# Patient Record
Sex: MALE | Race: WHITE | NOT HISPANIC OR LATINO | Employment: OTHER | URBAN - METROPOLITAN AREA
[De-identification: names, ages, dates, MRNs, and addresses within clinical notes are randomized per-mention and may not be internally consistent; named-entity substitution may affect disease eponyms.]

---

## 2018-02-13 ENCOUNTER — OFFICE VISIT CONVERTED (OUTPATIENT)
Dept: OTOLARYNGOLOGY | Facility: CLINIC | Age: 78
End: 2018-02-13
Attending: OTOLARYNGOLOGY

## 2018-02-13 ENCOUNTER — CONVERSION ENCOUNTER (OUTPATIENT)
Dept: OTOLARYNGOLOGY | Facility: CLINIC | Age: 78
End: 2018-02-13

## 2018-08-15 ENCOUNTER — OFFICE VISIT CONVERTED (OUTPATIENT)
Dept: OTOLARYNGOLOGY | Facility: CLINIC | Age: 78
End: 2018-08-15
Attending: OTOLARYNGOLOGY

## 2018-09-06 ENCOUNTER — OFFICE VISIT CONVERTED (OUTPATIENT)
Dept: OTHER | Facility: HOSPITAL | Age: 78
End: 2018-09-06
Attending: NURSE PRACTITIONER

## 2018-11-28 ENCOUNTER — OFFICE VISIT CONVERTED (OUTPATIENT)
Dept: OTHER | Facility: HOSPITAL | Age: 78
End: 2018-11-28
Attending: NURSE PRACTITIONER

## 2018-11-28 ENCOUNTER — CONVERSION ENCOUNTER (OUTPATIENT)
Dept: OTHER | Facility: HOSPITAL | Age: 78
End: 2018-11-28

## 2019-06-10 ENCOUNTER — CONVERSION ENCOUNTER (OUTPATIENT)
Dept: OTHER | Facility: HOSPITAL | Age: 79
End: 2019-06-10

## 2019-06-10 ENCOUNTER — OFFICE VISIT CONVERTED (OUTPATIENT)
Dept: OTHER | Facility: HOSPITAL | Age: 79
End: 2019-06-10
Attending: NURSE PRACTITIONER

## 2019-06-14 ENCOUNTER — CONVERSION ENCOUNTER (OUTPATIENT)
Dept: OTHER | Facility: HOSPITAL | Age: 79
End: 2019-06-14

## 2019-06-14 ENCOUNTER — OFFICE VISIT CONVERTED (OUTPATIENT)
Dept: OTHER | Facility: HOSPITAL | Age: 79
End: 2019-06-14
Attending: NURSE PRACTITIONER

## 2019-06-14 ENCOUNTER — HOSPITAL ENCOUNTER (OUTPATIENT)
Dept: OTHER | Facility: HOSPITAL | Age: 79
Discharge: HOME | End: 2019-06-14
Attending: NURSE PRACTITIONER

## 2019-06-14 LAB
BASOPHILS # BLD AUTO: 0.05 10*3/UL (ref 0–0.2)
BASOPHILS NFR BLD AUTO: 0.6 % (ref 0–3)
CONV ABS IMM GRAN: 0.03 10*3/UL (ref 0–0.2)
CONV IMMATURE GRAN: 0.4 % (ref 0–1.8)
DEPRECATED RDW RBC AUTO: 46.4 FL (ref 35.1–43.9)
EOSINOPHIL # BLD AUTO: 0.48 10*3/UL (ref 0–0.7)
EOSINOPHIL # BLD AUTO: 6.1 % (ref 0–7)
ERYTHROCYTE [DISTWIDTH] IN BLOOD BY AUTOMATED COUNT: 13.1 % (ref 11.6–14.4)
HBA1C MFR BLD: 14.7 G/DL (ref 14–18)
HCT VFR BLD AUTO: 44.5 % (ref 42–52)
LYMPHOCYTES # BLD AUTO: 1.48 10*3/UL (ref 1–5)
MCH RBC QN AUTO: 31.9 PG (ref 27–31)
MCHC RBC AUTO-ENTMCNC: 33 G/DL (ref 33–37)
MCV RBC AUTO: 96.5 FL (ref 80–96)
MONOCYTES # BLD AUTO: 0.57 10*3/UL (ref 0.2–1.2)
MONOCYTES NFR BLD AUTO: 7.2 % (ref 3–10)
NEUTROPHILS # BLD AUTO: 5.3 10*3/UL (ref 2–8)
NEUTROPHILS NFR BLD AUTO: 67 % (ref 30–85)
NRBC CBCN: 0 % (ref 0–0.7)
PLATELET # BLD AUTO: 164 10*3/UL (ref 130–400)
PMV BLD AUTO: 11.6 FL (ref 9.4–12.4)
RBC # BLD AUTO: 4.61 10*6/UL (ref 4.7–6.1)
VARIANT LYMPHS NFR BLD MANUAL: 18.7 % (ref 20–45)
WBC # BLD AUTO: 7.91 10*3/UL (ref 4.8–10.8)

## 2019-06-18 LAB — CONV ANTI GALACTOSE ALPHA 1,3 IGE: <0.1 KU/L

## 2019-07-01 ENCOUNTER — OFFICE VISIT CONVERTED (OUTPATIENT)
Dept: OTHER | Facility: HOSPITAL | Age: 79
End: 2019-07-01
Attending: NURSE PRACTITIONER

## 2019-07-01 ENCOUNTER — CONVERSION ENCOUNTER (OUTPATIENT)
Dept: OTHER | Facility: HOSPITAL | Age: 79
End: 2019-07-01

## 2019-07-01 ENCOUNTER — HOSPITAL ENCOUNTER (OUTPATIENT)
Dept: OTHER | Facility: HOSPITAL | Age: 79
Discharge: HOME | End: 2019-07-01
Attending: NURSE PRACTITIONER

## 2019-07-01 LAB
BASOPHILS # BLD AUTO: 0.05 10*3/UL (ref 0–0.2)
BASOPHILS NFR BLD AUTO: 0.7 % (ref 0–3)
CONV ABS IMM GRAN: 0.02 10*3/UL (ref 0–0.2)
CONV IMMATURE GRAN: 0.3 % (ref 0–1.8)
DEPRECATED RDW RBC AUTO: 47.7 FL (ref 35.1–43.9)
EOSINOPHIL # BLD AUTO: 0.13 10*3/UL (ref 0–0.7)
EOSINOPHIL # BLD AUTO: 1.9 % (ref 0–7)
ERYTHROCYTE [DISTWIDTH] IN BLOOD BY AUTOMATED COUNT: 13.1 % (ref 11.6–14.4)
HBA1C MFR BLD: 15.1 G/DL (ref 14–18)
HCT VFR BLD AUTO: 47.9 % (ref 42–52)
LYMPHOCYTES # BLD AUTO: 1.59 10*3/UL (ref 1–5)
MCH RBC QN AUTO: 30.9 PG (ref 27–31)
MCHC RBC AUTO-ENTMCNC: 31.5 G/DL (ref 33–37)
MCV RBC AUTO: 98.2 FL (ref 80–96)
MONOCYTES # BLD AUTO: 0.38 10*3/UL (ref 0.2–1.2)
MONOCYTES NFR BLD AUTO: 5.7 % (ref 3–10)
NEUTROPHILS # BLD AUTO: 4.51 10*3/UL (ref 2–8)
NEUTROPHILS NFR BLD AUTO: 67.6 % (ref 30–85)
NRBC CBCN: 0 % (ref 0–0.7)
PLATELET # BLD AUTO: 163 10*3/UL (ref 130–400)
PMV BLD AUTO: 11.7 FL (ref 9.4–12.4)
RBC # BLD AUTO: 4.88 10*6/UL (ref 4.7–6.1)
VARIANT LYMPHS NFR BLD MANUAL: 23.8 % (ref 20–45)
WBC # BLD AUTO: 6.68 10*3/UL (ref 4.8–10.8)

## 2019-07-02 LAB
25(OH)D3 SERPL-MCNC: 58.1 NG/ML (ref 30–100)
ALBUMIN SERPL-MCNC: 4.3 G/DL (ref 3.5–5)
ALBUMIN/GLOB SERPL: 1.5 {RATIO} (ref 1.4–2.6)
ALP SERPL-CCNC: 75 U/L (ref 56–155)
ALT SERPL-CCNC: 15 U/L (ref 10–40)
ANION GAP SERPL CALC-SCNC: 16 MMOL/L (ref 8–19)
AST SERPL-CCNC: 17 U/L (ref 15–50)
BILIRUB SERPL-MCNC: 0.46 MG/DL (ref 0.2–1.3)
BUN SERPL-MCNC: 12 MG/DL (ref 5–25)
BUN/CREAT SERPL: 11 {RATIO} (ref 6–20)
CALCIUM SERPL-MCNC: 9.9 MG/DL (ref 8.7–10.4)
CHLORIDE SERPL-SCNC: 98 MMOL/L (ref 99–111)
CHOLEST SERPL-MCNC: 251 MG/DL (ref 107–200)
CHOLEST/HDLC SERPL: 4.8 {RATIO} (ref 3–6)
CONV CO2: 28 MMOL/L (ref 22–32)
CONV CREATININE URINE, RANDOM: 107.5 MG/DL (ref 10–300)
CONV MICROALBUM.,U,RANDOM: <12 MG/L (ref 0–20)
CONV TOTAL PROTEIN: 7.1 G/DL (ref 6.3–8.2)
CREAT UR-MCNC: 1.1 MG/DL (ref 0.7–1.2)
EST. AVERAGE GLUCOSE BLD GHB EST-MCNC: 120 MG/DL
FOLATE SERPL-MCNC: 14.2 NG/ML (ref 4.8–20)
GFR SERPLBLD BASED ON 1.73 SQ M-ARVRAT: >60 ML/MIN/{1.73_M2}
GLOBULIN UR ELPH-MCNC: 2.8 G/DL (ref 2–3.5)
GLUCOSE SERPL-MCNC: 117 MG/DL (ref 70–99)
HBA1C MFR BLD: 5.8 % (ref 3.5–5.7)
HDLC SERPL-MCNC: 52 MG/DL (ref 40–60)
LDLC SERPL CALC-MCNC: 162 MG/DL (ref 70–100)
MICROALBUMIN/CREAT UR: 11.2 MG/G{CRE} (ref 0–25)
OSMOLALITY SERPL CALC.SUM OF ELEC: 287 MOSM/KG (ref 273–304)
POTASSIUM SERPL-SCNC: 4.4 MMOL/L (ref 3.5–5.3)
SODIUM SERPL-SCNC: 138 MMOL/L (ref 135–147)
TRIGL SERPL-MCNC: 186 MG/DL (ref 40–150)
VIT B12 SERPL-MCNC: 621 PG/ML (ref 211–911)
VLDLC SERPL-MCNC: 37 MG/DL (ref 5–37)

## 2019-07-19 ENCOUNTER — OFFICE VISIT CONVERTED (OUTPATIENT)
Dept: OTHER | Facility: HOSPITAL | Age: 79
End: 2019-07-19
Attending: NURSE PRACTITIONER

## 2019-08-08 ENCOUNTER — CONVERSION ENCOUNTER (OUTPATIENT)
Dept: OTHER | Facility: HOSPITAL | Age: 79
End: 2019-08-08

## 2019-08-08 ENCOUNTER — OFFICE VISIT CONVERTED (OUTPATIENT)
Dept: OTHER | Facility: HOSPITAL | Age: 79
End: 2019-08-08
Attending: NURSE PRACTITIONER

## 2019-08-15 ENCOUNTER — CONVERSION ENCOUNTER (OUTPATIENT)
Dept: OTHER | Facility: HOSPITAL | Age: 79
End: 2019-08-15

## 2019-08-15 ENCOUNTER — OFFICE VISIT CONVERTED (OUTPATIENT)
Dept: OTHER | Facility: HOSPITAL | Age: 79
End: 2019-08-15
Attending: NURSE PRACTITIONER

## 2019-08-22 ENCOUNTER — OFFICE VISIT CONVERTED (OUTPATIENT)
Dept: OTHER | Facility: HOSPITAL | Age: 79
End: 2019-08-22
Attending: NURSE PRACTITIONER

## 2019-08-22 ENCOUNTER — CONVERSION ENCOUNTER (OUTPATIENT)
Dept: OTHER | Facility: HOSPITAL | Age: 79
End: 2019-08-22

## 2019-08-29 ENCOUNTER — HOSPITAL ENCOUNTER (OUTPATIENT)
Dept: OTHER | Facility: HOSPITAL | Age: 79
Discharge: HOME | End: 2019-08-29
Attending: NURSE PRACTITIONER

## 2019-08-29 LAB
ANION GAP SERPL CALC-SCNC: 16 MMOL/L (ref 8–19)
BUN SERPL-MCNC: 16 MG/DL (ref 5–25)
BUN/CREAT SERPL: 14 {RATIO} (ref 6–20)
CALCIUM SERPL-MCNC: 9.3 MG/DL (ref 8.7–10.4)
CHLORIDE SERPL-SCNC: 101 MMOL/L (ref 99–111)
CONV CO2: 27 MMOL/L (ref 22–32)
CREAT UR-MCNC: 1.11 MG/DL (ref 0.7–1.2)
GFR SERPLBLD BASED ON 1.73 SQ M-ARVRAT: >60 ML/MIN/{1.73_M2}
GLUCOSE SERPL-MCNC: 182 MG/DL (ref 70–99)
OSMOLALITY SERPL CALC.SUM OF ELEC: 294 MOSM/KG (ref 273–304)
POTASSIUM SERPL-SCNC: 4.7 MMOL/L (ref 3.5–5.3)
SODIUM SERPL-SCNC: 139 MMOL/L (ref 135–147)

## 2019-09-23 ENCOUNTER — CONVERSION ENCOUNTER (OUTPATIENT)
Dept: OTHER | Facility: HOSPITAL | Age: 79
End: 2019-09-23

## 2019-09-23 ENCOUNTER — HOSPITAL ENCOUNTER (OUTPATIENT)
Dept: OTHER | Facility: HOSPITAL | Age: 79
Discharge: HOME | End: 2019-09-23
Attending: NURSE PRACTITIONER

## 2019-09-23 ENCOUNTER — OFFICE VISIT CONVERTED (OUTPATIENT)
Dept: OTHER | Facility: HOSPITAL | Age: 79
End: 2019-09-23
Attending: NURSE PRACTITIONER

## 2019-11-27 ENCOUNTER — OFFICE VISIT CONVERTED (OUTPATIENT)
Dept: OTHER | Facility: HOSPITAL | Age: 79
End: 2019-11-27
Attending: NURSE PRACTITIONER

## 2019-11-27 ENCOUNTER — CONVERSION ENCOUNTER (OUTPATIENT)
Dept: OTHER | Facility: HOSPITAL | Age: 79
End: 2019-11-27

## 2019-12-23 ENCOUNTER — CONVERSION ENCOUNTER (OUTPATIENT)
Dept: OTHER | Facility: HOSPITAL | Age: 79
End: 2019-12-23

## 2019-12-23 ENCOUNTER — HOSPITAL ENCOUNTER (OUTPATIENT)
Dept: OTHER | Facility: HOSPITAL | Age: 79
Discharge: HOME | End: 2019-12-23
Attending: NURSE PRACTITIONER

## 2019-12-23 ENCOUNTER — OFFICE VISIT CONVERTED (OUTPATIENT)
Dept: OTHER | Facility: HOSPITAL | Age: 79
End: 2019-12-23
Attending: NURSE PRACTITIONER

## 2019-12-23 LAB
ALBUMIN SERPL-MCNC: 4.4 G/DL (ref 3.5–5)
ALBUMIN/GLOB SERPL: 1.9 {RATIO} (ref 1.4–2.6)
ALP SERPL-CCNC: 72 U/L (ref 56–155)
ALT SERPL-CCNC: 9 U/L (ref 10–40)
ANION GAP SERPL CALC-SCNC: 18 MMOL/L (ref 8–19)
AST SERPL-CCNC: 18 U/L (ref 15–50)
BASOPHILS # BLD AUTO: 0.03 10*3/UL (ref 0–0.2)
BASOPHILS NFR BLD AUTO: 0.5 % (ref 0–3)
BILIRUB SERPL-MCNC: 0.47 MG/DL (ref 0.2–1.3)
BUN SERPL-MCNC: 13 MG/DL (ref 5–25)
BUN/CREAT SERPL: 12 {RATIO} (ref 6–20)
CALCIUM SERPL-MCNC: 9.4 MG/DL (ref 8.7–10.4)
CHLORIDE SERPL-SCNC: 101 MMOL/L (ref 99–111)
CHOLEST SERPL-MCNC: 209 MG/DL (ref 107–200)
CHOLEST/HDLC SERPL: 4.8 {RATIO} (ref 3–6)
CONV ABS IMM GRAN: 0.01 10*3/UL (ref 0–0.2)
CONV CO2: 26 MMOL/L (ref 22–32)
CONV CREATININE URINE, RANDOM: 147.4 MG/DL (ref 10–300)
CONV IMMATURE GRAN: 0.2 % (ref 0–1.8)
CONV MICROALBUM.,U,RANDOM: 12.8 MG/L (ref 0–20)
CONV TOTAL PROTEIN: 6.7 G/DL (ref 6.3–8.2)
CREAT UR-MCNC: 1.06 MG/DL (ref 0.7–1.2)
DEPRECATED RDW RBC AUTO: 46 FL (ref 35.1–43.9)
EOSINOPHIL # BLD AUTO: 0.1 10*3/UL (ref 0–0.7)
EOSINOPHIL # BLD AUTO: 1.7 % (ref 0–7)
ERYTHROCYTE [DISTWIDTH] IN BLOOD BY AUTOMATED COUNT: 13.2 % (ref 11.6–14.4)
EST. AVERAGE GLUCOSE BLD GHB EST-MCNC: 114 MG/DL
GFR SERPLBLD BASED ON 1.73 SQ M-ARVRAT: >60 ML/MIN/{1.73_M2}
GLOBULIN UR ELPH-MCNC: 2.3 G/DL (ref 2–3.5)
GLUCOSE SERPL-MCNC: 100 MG/DL (ref 70–99)
HBA1C MFR BLD: 5.6 % (ref 3.5–5.7)
HCT VFR BLD AUTO: 43.2 % (ref 42–52)
HDLC SERPL-MCNC: 44 MG/DL (ref 40–60)
HGB BLD-MCNC: 14.4 G/DL (ref 14–18)
LDLC SERPL CALC-MCNC: 138 MG/DL (ref 70–100)
LYMPHOCYTES # BLD AUTO: 1.48 10*3/UL (ref 1–5)
LYMPHOCYTES NFR BLD AUTO: 25.4 % (ref 20–45)
MCH RBC QN AUTO: 31.6 PG (ref 27–31)
MCHC RBC AUTO-ENTMCNC: 33.3 G/DL (ref 33–37)
MCV RBC AUTO: 94.7 FL (ref 80–96)
MICROALBUMIN/CREAT UR: 8.7 MG/G{CRE} (ref 0–25)
MONOCYTES # BLD AUTO: 0.45 10*3/UL (ref 0.2–1.2)
MONOCYTES NFR BLD AUTO: 7.7 % (ref 3–10)
NEUTROPHILS # BLD AUTO: 3.75 10*3/UL (ref 2–8)
NEUTROPHILS NFR BLD AUTO: 64.5 % (ref 30–85)
NRBC CBCN: 0 % (ref 0–0.7)
OSMOLALITY SERPL CALC.SUM OF ELEC: 290 MOSM/KG (ref 273–304)
PLATELET # BLD AUTO: 153 10*3/UL (ref 130–400)
PMV BLD AUTO: 11.3 FL (ref 9.4–12.4)
POTASSIUM SERPL-SCNC: 4.5 MMOL/L (ref 3.5–5.3)
RBC # BLD AUTO: 4.56 10*6/UL (ref 4.7–6.1)
SODIUM SERPL-SCNC: 140 MMOL/L (ref 135–147)
TRIGL SERPL-MCNC: 135 MG/DL (ref 40–150)
VLDLC SERPL-MCNC: 27 MG/DL (ref 5–37)
WBC # BLD AUTO: 5.82 10*3/UL (ref 4.8–10.8)

## 2020-02-19 ENCOUNTER — CONVERSION ENCOUNTER (OUTPATIENT)
Dept: OTHER | Facility: HOSPITAL | Age: 80
End: 2020-02-19

## 2020-02-19 ENCOUNTER — HOSPITAL ENCOUNTER (OUTPATIENT)
Dept: OTHER | Facility: HOSPITAL | Age: 80
Discharge: HOME | End: 2020-02-19
Attending: NURSE PRACTITIONER

## 2020-02-19 ENCOUNTER — OFFICE VISIT CONVERTED (OUTPATIENT)
Dept: OTHER | Facility: HOSPITAL | Age: 80
End: 2020-02-19
Attending: NURSE PRACTITIONER

## 2020-02-19 LAB
ANION GAP SERPL CALC-SCNC: 21 MMOL/L (ref 8–19)
BUN SERPL-MCNC: 12 MG/DL (ref 5–25)
BUN/CREAT SERPL: 12 {RATIO} (ref 6–20)
CALCIUM SERPL-MCNC: 9.2 MG/DL (ref 8.7–10.4)
CHLORIDE SERPL-SCNC: 103 MMOL/L (ref 99–111)
CONV CO2: 22 MMOL/L (ref 22–32)
CREAT UR-MCNC: 1.01 MG/DL (ref 0.7–1.2)
GFR SERPLBLD BASED ON 1.73 SQ M-ARVRAT: >60 ML/MIN/{1.73_M2}
GLUCOSE SERPL-MCNC: 91 MG/DL (ref 70–99)
OSMOLALITY SERPL CALC.SUM OF ELEC: 291 MOSM/KG (ref 273–304)
POTASSIUM SERPL-SCNC: 4.6 MMOL/L (ref 3.5–5.3)
SODIUM SERPL-SCNC: 141 MMOL/L (ref 135–147)

## 2020-02-20 LAB
EST. AVERAGE GLUCOSE BLD GHB EST-MCNC: 111 MG/DL
HBA1C MFR BLD: 5.5 % (ref 3.5–5.7)

## 2020-03-05 ENCOUNTER — OFFICE VISIT CONVERTED (OUTPATIENT)
Dept: OTHER | Facility: HOSPITAL | Age: 80
End: 2020-03-05
Attending: NURSE PRACTITIONER

## 2020-03-05 ENCOUNTER — CONVERSION ENCOUNTER (OUTPATIENT)
Dept: OTHER | Facility: HOSPITAL | Age: 80
End: 2020-03-05

## 2020-04-16 ENCOUNTER — TELEPHONE CONVERTED (OUTPATIENT)
Dept: OTHER | Facility: HOSPITAL | Age: 80
End: 2020-04-16
Attending: NURSE PRACTITIONER

## 2020-05-05 ENCOUNTER — TELEPHONE CONVERTED (OUTPATIENT)
Dept: OTHER | Facility: HOSPITAL | Age: 80
End: 2020-05-05
Attending: NURSE PRACTITIONER

## 2020-05-07 ENCOUNTER — HOSPITAL ENCOUNTER (OUTPATIENT)
Dept: OTHER | Facility: HOSPITAL | Age: 80
Discharge: HOME | End: 2020-05-07
Attending: NURSE PRACTITIONER

## 2020-05-07 LAB
ALBUMIN SERPL-MCNC: 4.4 G/DL (ref 3.5–5)
ALBUMIN/GLOB SERPL: 1.9 {RATIO} (ref 1.4–2.6)
ALP SERPL-CCNC: 71 U/L (ref 56–155)
ALT SERPL-CCNC: 12 U/L (ref 10–40)
ANION GAP SERPL CALC-SCNC: 16 MMOL/L (ref 8–19)
AST SERPL-CCNC: 19 U/L (ref 15–50)
BILIRUB SERPL-MCNC: 0.47 MG/DL (ref 0.2–1.3)
BUN SERPL-MCNC: 12 MG/DL (ref 5–25)
BUN/CREAT SERPL: 11 {RATIO} (ref 6–20)
CALCIUM SERPL-MCNC: 9.5 MG/DL (ref 8.7–10.4)
CHLORIDE SERPL-SCNC: 101 MMOL/L (ref 99–111)
CONV CO2: 24 MMOL/L (ref 22–32)
CONV TOTAL PROTEIN: 6.7 G/DL (ref 6.3–8.2)
CREAT UR-MCNC: 1.11 MG/DL (ref 0.7–1.2)
EST. AVERAGE GLUCOSE BLD GHB EST-MCNC: 143 MG/DL
GFR SERPLBLD BASED ON 1.73 SQ M-ARVRAT: >60 ML/MIN/{1.73_M2}
GLOBULIN UR ELPH-MCNC: 2.3 G/DL (ref 2–3.5)
GLUCOSE SERPL-MCNC: 127 MG/DL (ref 70–99)
HBA1C MFR BLD: 6.6 % (ref 3.5–5.7)
OSMOLALITY SERPL CALC.SUM OF ELEC: 285 MOSM/KG (ref 273–304)
POTASSIUM SERPL-SCNC: 4.3 MMOL/L (ref 3.5–5.3)
SODIUM SERPL-SCNC: 137 MMOL/L (ref 135–147)

## 2020-06-23 ENCOUNTER — HOSPITAL ENCOUNTER (OUTPATIENT)
Dept: OTHER | Facility: HOSPITAL | Age: 80
Discharge: HOME | End: 2020-06-23
Attending: NURSE PRACTITIONER

## 2020-06-23 ENCOUNTER — OFFICE VISIT CONVERTED (OUTPATIENT)
Dept: OTHER | Facility: HOSPITAL | Age: 80
End: 2020-06-23
Attending: NURSE PRACTITIONER

## 2020-06-23 ENCOUNTER — CONVERSION ENCOUNTER (OUTPATIENT)
Dept: OTHER | Facility: HOSPITAL | Age: 80
End: 2020-06-23

## 2020-06-23 LAB
ALBUMIN SERPL-MCNC: 4.6 G/DL (ref 3.5–5)
ALBUMIN/GLOB SERPL: 1.8 {RATIO} (ref 1.4–2.6)
ALP SERPL-CCNC: 77 U/L (ref 56–155)
ALT SERPL-CCNC: 12 U/L (ref 10–40)
ANION GAP SERPL CALC-SCNC: 16 MMOL/L (ref 8–19)
AST SERPL-CCNC: 17 U/L (ref 15–50)
BASOPHILS # BLD AUTO: 0.04 10*3/UL (ref 0–0.2)
BASOPHILS NFR BLD AUTO: 0.7 % (ref 0–3)
BILIRUB SERPL-MCNC: 0.41 MG/DL (ref 0.2–1.3)
BUN SERPL-MCNC: 16 MG/DL (ref 5–25)
BUN/CREAT SERPL: 16 {RATIO} (ref 6–20)
CALCIUM SERPL-MCNC: 9.6 MG/DL (ref 8.7–10.4)
CHLORIDE SERPL-SCNC: 98 MMOL/L (ref 99–111)
CHOLEST SERPL-MCNC: 197 MG/DL (ref 107–200)
CHOLEST/HDLC SERPL: 3.5 {RATIO} (ref 3–6)
CONV ABS IMM GRAN: 0.01 10*3/UL (ref 0–0.2)
CONV CO2: 28 MMOL/L (ref 22–32)
CONV IMMATURE GRAN: 0.2 % (ref 0–1.8)
CONV TOTAL PROTEIN: 7.1 G/DL (ref 6.3–8.2)
CREAT UR-MCNC: 1.02 MG/DL (ref 0.7–1.2)
DEPRECATED RDW RBC AUTO: 45.5 FL (ref 35.1–43.9)
EOSINOPHIL # BLD AUTO: 0.07 10*3/UL (ref 0–0.7)
EOSINOPHIL # BLD AUTO: 1.2 % (ref 0–7)
ERYTHROCYTE [DISTWIDTH] IN BLOOD BY AUTOMATED COUNT: 12.6 % (ref 11.6–14.4)
EST. AVERAGE GLUCOSE BLD GHB EST-MCNC: 137 MG/DL
FOLATE SERPL-MCNC: 19.8 NG/ML (ref 4.8–20)
GFR SERPLBLD BASED ON 1.73 SQ M-ARVRAT: >60 ML/MIN/{1.73_M2}
GLOBULIN UR ELPH-MCNC: 2.5 G/DL (ref 2–3.5)
GLUCOSE SERPL-MCNC: 202 MG/DL (ref 70–99)
HBA1C MFR BLD: 6.4 % (ref 3.5–5.7)
HCT VFR BLD AUTO: 47.8 % (ref 42–52)
HDLC SERPL-MCNC: 56 MG/DL (ref 40–60)
HGB BLD-MCNC: 15.1 G/DL (ref 14–18)
LDLC SERPL CALC-MCNC: 93 MG/DL (ref 70–100)
LYMPHOCYTES # BLD AUTO: 1.33 10*3/UL (ref 1–5)
LYMPHOCYTES NFR BLD AUTO: 21.9 % (ref 20–45)
MCH RBC QN AUTO: 31.2 PG (ref 27–31)
MCHC RBC AUTO-ENTMCNC: 31.6 G/DL (ref 33–37)
MCV RBC AUTO: 98.8 FL (ref 80–96)
MONOCYTES # BLD AUTO: 0.32 10*3/UL (ref 0.2–1.2)
MONOCYTES NFR BLD AUTO: 5.3 % (ref 3–10)
NEUTROPHILS # BLD AUTO: 4.29 10*3/UL (ref 2–8)
NEUTROPHILS NFR BLD AUTO: 70.7 % (ref 30–85)
NRBC CBCN: 0 % (ref 0–0.7)
OSMOLALITY SERPL CALC.SUM OF ELEC: 291 MOSM/KG (ref 273–304)
PLATELET # BLD AUTO: 168 10*3/UL (ref 130–400)
PMV BLD AUTO: 11.3 FL (ref 9.4–12.4)
POTASSIUM SERPL-SCNC: 5 MMOL/L (ref 3.5–5.3)
PSA SERPL-MCNC: 1 NG/ML (ref 0–4)
RBC # BLD AUTO: 4.84 10*6/UL (ref 4.7–6.1)
SODIUM SERPL-SCNC: 137 MMOL/L (ref 135–147)
T4 FREE SERPL-MCNC: 1.4 NG/DL (ref 0.9–1.8)
TRIGL SERPL-MCNC: 240 MG/DL (ref 40–150)
TSH SERPL-ACNC: 2.56 M[IU]/L (ref 0.27–4.2)
VIT B12 SERPL-MCNC: 340 PG/ML (ref 211–911)
VLDLC SERPL-MCNC: 48 MG/DL (ref 5–37)
WBC # BLD AUTO: 6.06 10*3/UL (ref 4.8–10.8)

## 2020-06-24 LAB — 25(OH)D3 SERPL-MCNC: 45.2 NG/ML (ref 30–100)

## 2020-08-06 ENCOUNTER — HOSPITAL ENCOUNTER (OUTPATIENT)
Dept: OTHER | Facility: HOSPITAL | Age: 80
Discharge: HOME | End: 2020-08-06
Attending: NURSE PRACTITIONER

## 2020-08-06 ENCOUNTER — OFFICE VISIT CONVERTED (OUTPATIENT)
Dept: OTHER | Facility: HOSPITAL | Age: 80
End: 2020-08-06
Attending: NURSE PRACTITIONER

## 2020-08-06 ENCOUNTER — CONVERSION ENCOUNTER (OUTPATIENT)
Dept: OTHER | Facility: HOSPITAL | Age: 80
End: 2020-08-06

## 2020-08-06 LAB
TESTOST SERPL-MCNC: 631 NG/DL (ref 193–740)
URATE SERPL-MCNC: 6.8 MG/DL (ref 3.5–8.5)

## 2021-04-15 ENCOUNTER — HOSPITAL ENCOUNTER (OUTPATIENT)
Dept: PREADMISSION TESTING | Facility: HOSPITAL | Age: 81
Discharge: HOME | End: 2021-04-15
Attending: SPECIALIST

## 2021-04-16 LAB — SARS-COV-2 RNA SPEC QL NAA+PROBE: NOT DETECTED

## 2021-04-19 ENCOUNTER — HOSPITAL ENCOUNTER (OUTPATIENT)
Dept: CARDIOLOGY | Facility: HOSPITAL | Age: 81
Discharge: HOME | End: 2021-04-19
Attending: SPECIALIST

## 2021-04-19 LAB
ANION GAP SERPL CALC-SCNC: 13 MMOL/L (ref 8–19)
APTT BLD: 23.1 S (ref 22.2–34.2)
BASOPHILS # BLD AUTO: 0.04 10*3/UL (ref 0–0.2)
BASOPHILS NFR BLD AUTO: 0.5 % (ref 0–3)
BUN SERPL-MCNC: 17 MG/DL (ref 5–25)
BUN/CREAT SERPL: 15 {RATIO} (ref 6–20)
CALCIUM SERPL-MCNC: 9.8 MG/DL (ref 8.7–10.4)
CHLORIDE SERPL-SCNC: 99 MMOL/L (ref 99–111)
CONV ABS IMM GRAN: 0.03 10*3/UL (ref 0–0.2)
CONV CO2: 29 MMOL/L (ref 22–32)
CONV IMMATURE GRAN: 0.4 % (ref 0–1.8)
CREAT UR-MCNC: 1.12 MG/DL (ref 0.7–1.2)
DEPRECATED RDW RBC AUTO: 43.7 FL (ref 35.1–43.9)
EOSINOPHIL # BLD AUTO: 0.17 10*3/UL (ref 0–0.7)
EOSINOPHIL # BLD AUTO: 2.2 % (ref 0–7)
ERYTHROCYTE [DISTWIDTH] IN BLOOD BY AUTOMATED COUNT: 12.6 % (ref 11.6–14.4)
GFR SERPLBLD BASED ON 1.73 SQ M-ARVRAT: >60 ML/MIN/{1.73_M2}
GLUCOSE SERPL-MCNC: 131 MG/DL (ref 70–99)
HCT VFR BLD AUTO: 48 % (ref 42–52)
HGB BLD-MCNC: 16 G/DL (ref 14–18)
INR PPP: 0.84 (ref 2–3)
LYMPHOCYTES # BLD AUTO: 1.74 10*3/UL (ref 1–5)
LYMPHOCYTES NFR BLD AUTO: 22.4 % (ref 20–45)
MCH RBC QN AUTO: 31.3 PG (ref 27–31)
MCHC RBC AUTO-ENTMCNC: 33.3 G/DL (ref 33–37)
MCV RBC AUTO: 93.9 FL (ref 80–96)
MONOCYTES # BLD AUTO: 0.51 10*3/UL (ref 0.2–1.2)
MONOCYTES NFR BLD AUTO: 6.6 % (ref 3–10)
NEUTROPHILS # BLD AUTO: 5.28 10*3/UL (ref 2–8)
NEUTROPHILS NFR BLD AUTO: 67.9 % (ref 30–85)
NRBC CBCN: 0 % (ref 0–0.7)
OSMOLALITY SERPL CALC.SUM OF ELEC: 287 MOSM/KG (ref 273–304)
PLATELET # BLD AUTO: 177 10*3/UL (ref 130–400)
PMV BLD AUTO: 10.6 FL (ref 9.4–12.4)
POTASSIUM SERPL-SCNC: 4.3 MMOL/L (ref 3.5–5.3)
PROTHROMBIN TIME: 9.5 S (ref 9.4–12)
RBC # BLD AUTO: 5.11 10*6/UL (ref 4.7–6.1)
SODIUM SERPL-SCNC: 137 MMOL/L (ref 135–147)
WBC # BLD AUTO: 7.77 10*3/UL (ref 4.8–10.8)

## 2021-05-12 NOTE — PROGRESS NOTES
Quick Note      Patient Name: Andrea Odom   Patient ID: [id number]   Sex: Male   YOB: 1940    Primary Care Provider: Mary PAYNE   Referring Provider: Mary PAYNE    Visit Date: April 16, 2020    Provider: KERRY Casas   Location: [facility]   Location Address: [address] [place] [address]  [id number]   Location Phone: [phone]          History Of Present Illness  TELEHEALTH TELEPHONE VISIT  Chief Complaint: Discuss DM and meds   Andrea Odom is a 79 year old /White male who is presenting for evaluation via telehealth telephone visit. Verbal consent obtained before beginning visit.   Provider spent 8 minutes with the patient during telehealth visit.   The following staff were present during this visit: Olivia Floyd CMA, Mary PAYNE   Past Medical History/Overview of Patient Symptoms     **Telephone Visit    Patient was recently taken off his glimepiride and started on metformin for his diabetes, since then he has had an episode of chest pain and was taken to the hospital.  He underwent stress test and complete cardiac work-up and was found to be negative.  His blood pressures have been running high, patient felt to be the metformin so he stopped the metformin and states that his blood pressures have not been as high as they were before.  He is requesting a different medication.    Time In: 1030  Time Out: 1038           Assessment  · Essential hypertension     401.9/I10  · Type 2 diabetes mellitus     250.00/E11.9  · CAD (coronary artery disease)     414.00/I25.10    Problems Reconciled  Plan  · Orders  o Physican Telephone evaluation, 5-10 min (19501) - - 04/16/2020  · Medications  o Steglatro 5 mg oral tablet   SIG: take 1 tablet (5 mg) by oral route once daily in the morning for 30 days   DISP: (30) tablets with 5 refills  Prescribed on 04/16/2020     · Instructions  o Plan Of Care: At this time we will stop the metformin, I will send the  patient in Steglatro 5 mg once daily for his diabetes. Advised patient to continue to monitor sugar and blood pressure he has a scheduled appointment on May 5 and we will see him back then. If he has any issues before then please do not hesitate to call the office.  o Take all medications as prescribed/directed.  o Call the office with any concerns or questions.  o Discussed Covid-19 precautions including, but not limited to, social distancing, avoid touching your face, and hand washing.   · Disposition  o Follow Up PRN            Electronically Signed by: KERRY Casas -Author on April 16, 2020 11:44:01 AM

## 2021-05-13 NOTE — PROGRESS NOTES
Progress Note      Patient Name: Andrea Odom   Patient ID: [id number]   Sex: Male   YOB: 1940    Primary Care Provider: Mary PAYNE   Referring Provider: Mary PAYNE    Visit Date: June 23, 2020    Provider: KERRY Casas   Location: [facility]   Location Address: [address] [place] [address]  [id number]   Location Phone: [phone]          Chief Complaint  · fatigue      History Of Present Illness  Andrea Odom is a 80 year old /White male who presents for evaluation and treatment of:      Complains of fatigue, requesting lab work to check vitamins. Is also noted to be on the low side at the last visit.  Is 108/80. Has a history of Vitamin D def and is currently taking OTC Vitamin D3. He has been on B12 injections in the past.as well. Patient does have hypothyroidism.     C/O chest pain every night before he goes to bed, states that he will take a Nitro and then will feel better. Is currently on Isosorbide. Patient is due to follow up with cardiology. Follows with them for CAD, history of MI with stent placements.     Patient on Glimepiride for Diabetes and last A1C and was 6.6.       Past Medical History  Disease Name Date Onset Notes   Anxiety --  --    CAD (coronary artery disease) --  --    Dizziness --  --    GERD (gastroesophageal reflux disease) --  --    Hypertension --  --    Hypothyroidism --  --    NSTEMI (non-ST elevated myocardial infarction) July 2019 X2   Severe aortic stenosis --  --    Type 2 diabetes mellitus --  --    Vertigo --  --    Vocal fold paralysis, right --  --    Voice hoarseness --  --          Past Surgical History  Procedure Name Date Notes   Direct revascularization heart muscle with catheter stent, prosthesis, or vein graft 2015 --    EGD 2017 --    Open Heart Surgery 24 years ago --          Medication List  Name Date Started Instructions   amlodipine 2.5 mg oral tablet  take 1 tablet (2.5 mg) by oral route once  daily for 30 days   aspirin 81 mg oral tablet,chewable  chew 1 tablet (81 mg) by oral route once daily   Carafate 1 gram oral tablet 12/23/2019 take 1 tablet by oral route 2 times a day for 30 days   clopidogrel 75 mg oral tablet 12/23/2019 TAKE 1 TABLET BY MOUTH ONCE DAILY for 90 days   furosemide 20 mg oral tablet 09/23/2019 take 1 tablet (20 mg) by oral route once daily for 90 days   glimepiride 1 mg oral tablet 05/07/2020 take 1 tablet (1 mg) by oral route once daily for 30 days   isosorbide mononitrate 30 mg oral tablet extended release 24 hr  take 1 tablet by oral route daily as needed   levothyroxine 88 mcg oral tablet 04/09/2020 TAKE 1 TABLET BY MOUTH ONCE DAILY   lisinopril-hydrochlorothiazide 10-12.5 mg oral tablet 06/11/2020 TAKE 1 TABLET BY MOUTH ONCE DAILY for 90 days   meclizine 25 mg oral tablet 09/06/2018 take 1 tablet by oral route every 8 hours for 30 days   Nitrostat 0.4 mg sublingual tablet, sublingual  place 1 tablet (0.4 mg) by sublingual route every 5 minutes as needed for chest pain   rosuvastatin 20 mg oral tablet 12/31/2019 TAKE 1 TABLET BY MOUTH ONCE DAILY AT BEDTIME   triamcinolone acetonide 0.5 % topical cream 11/27/2019 apply a thin layer to the affected area(s) by topical route 2 times per day for 14 days         Allergy List  Allergen Name Date Reaction Notes   PENICILLINS --  --  --    Protonix --  --  --    SULFA (SULFONAMIDES) --  --  --        Allergies Reconciled  Family Medical History  Disease Name Relative/Age Notes   Stroke Brother/  Father/   --    Alzheimer's Disease Mother/   --    Heart Disease Brother/  Mother/  Sister/   --    Diabetes Brother/  Father/  Sister/   --          Social History  Finding Status Start/Stop Quantity Notes   Alcohol Never --/-- --  --    Recreational Drug Use Never --/-- --  --    Tobacco Never --/-- --  --          Immunizations  NameDate Admin Mfg Trade Name Lot Number Route Inj VIS Given VIS Publication   InfluenzaRefused 06/10/2019 NE Not  "Entered  NE NE     Comments:    [id number] 08/08/2019 NE Not Entered  NE NE     Comments:    Prevnar 13Refused 08/08/2019 NE Not Entered  NE NE     Comments:          Review of Systems  · Constitutional  o Admits  o : fatigue, weakness  o Denies  o : sick contacts, fever, chills  · Cardiovascular  o Denies  o : dypnea on exertion, pain in chest  · Respiratory  o Denies  o : shortness of breath, cough  · Gastrointestinal  o Denies  o : diarrhea, constipation  · Genitourinary  o Denies  o : urgency, frequency      Vitals  Date Time BP Position Site L\R Cuff Size HR RR TEMP (F) WT  HT  BMI kg/m2 BSA m2 O2 Sat HC       06/23/2020 05:02 /80 Sitting    77 - R 16 97.2 174lbs 16oz 5'  8\" 26.61 1.95 97 %          Physical Examination  · Constitutional  o Appearance  o : well-nourished, well developed, alert, in no acute distress, well-tended appearance  · Head and Face  o Head  o :   § Inspection  § : atraumatic, normocephalic  · Eyes  o Eyes  o : extraocular movements intact, no scleral icterus, no conjunctival injection  · Respiratory  o Respiratory Effort  o : breathing comfortably, symmetric chest rise  o Auscultation of Lungs  o : clear to asculatation bilaterally, no wheezes, rales, or rhonchii  · Cardiovascular  o Heart  o :   § Auscultation of Heart  § : regular rate and rhythm, no murmurs, rubs, or gallops  o Peripheral Vascular System  o :   § Extremities  § : no edema  · Gastrointestinal  o Abdominal Examination  o :   § Abdomen  § : bowel sounds present, non-distended, non-tender  · Skin and Subcutaneous Tissue  o General Inspection  o : no lesions present, no areas of discoloration, skin turgor normal  · Neurologic  o Mental Status Examination  o :   § Orientation  § : grossly oriented to person, place and time  o Gait and Station  o :   § Gait Screening  § : normal gait  · Psychiatric  o General  o : normal mood and affect          Assessment  · Fatigue     780.79/R53.83  · Vitamin D " deficiency     268.9/E55.9  · Type 2 diabetes mellitus     250.00/E11.9  · CAD (coronary artery disease)     414.00/I25.10    Problems Reconciled  Plan  · Orders  o Thyroid Profile (95374, 77915, THYII) - 780.79/R53.83 - 06/23/2020  o B12 Folate levels (B12FO) - 780.79/R53.83 - 06/23/2020  o Hgb A1c [facility] (29010) - 250.00/E11.9 - 06/23/2020  o Physical, Primary Care Panel (CBC, CMP, Lipid, TSH) [facility] (34389, 93903, 70816, 85662) - 250.00/E11.9, 414.00/I25.10 - 06/23/2020  o Vitamin D (25-Hydroxy) Level (68334) - 268.9/E55.9 - 06/23/2020  o ACO-39: Current medications updated and reviewed () - - 06/23/2020  · Medications  o Medications have been Reconciled  o Transition of Care or Provider Policy  · Instructions  o Patient is taking medications as prescribed and doing well.   o Patient was educated/instructed on their diagnosis, treatment and medications prior to discharge from the clinic today.  o Call the office with any concerns or questions.  o Routine labs including a B12, folate, and vitamin D.  o Advised patient to schedule follow-up with Dr. Horne about his chest pain.  · Disposition  o Follow Up in 3 months     We will see Andrea back in 3 months, advised patient that if his chest pain were to worsen or he were to begin having shortness of breath, to please follow-up in the emergency department.    EMR dragon/transcription disclaimer: Much of this encounter note is an electronic transcription/translation of spoken language to printed text.  Electronic translation of spoken language may permit erroneous, or at times nonsensical words or phrases to be inadvertently transcribed; although I have reviewed the note for such errors, some may still exist.             Electronically Signed by: KERRY Caass -Author on June 23, 2020 06:00:02 PM

## 2021-05-13 NOTE — PROGRESS NOTES
Progress Note      Patient Name: Andrea Odom   Patient ID: [id number]   Sex: Male   YOB: 1940    Primary Care Provider: Mary PAYNE   Referring Provider: Mary PAYNE    Visit Date: June 23, 2020    Provider: KERRY Casas   Location: [facility]   Location Address: [address] [place] [address]  [id number]   Location Phone: [phone]          Chief Complaint  · Annual Wellness Exam      History Of Present Illness  The patient is a 80 year old /White male who has come to this office for his Annual Wellness Visit.   His Primary Care Provider is Mray PAYNE. His comprehensive Care Team list, including suppliers, has been updated on the Facesheet. His medical/family history, height, weight, BMI, and blood pressure have been reviewed and are in the chart. The Health Risk Assessment has been completed and scanned in the chart.   Medications are listed in the medication list.   The active problem list includes: Anxiety, CAD (coronary artery disease), Dizziness, GERD (gastroesophageal reflux disease), Hypertension, Hypothyroidism, Severe aortic stenosis, Type 2 diabetes mellitus, Vertigo, Vocal fold paralysis, right, and Voice hoarseness   The patient does not have a history of substance use.   Patient reports his diet is adequate.   The Mini-Cog has been administered and is scanned in chart. The results are negative. His cognitive function is without limitation.   A hearing loss screen was completed today and the result is negative.   Patient does not have any risk factors for depression. Patient completed the PHQ-9 today and it has been scanned in the chart. The total score is 1-4.   The Timed Up and Go screen was administered today and the result is negative.   The Roberts Index of Spencer in ADLs indicated full function (score of 6).   A Falls Risk Assessment has been completed, including a review of home fall hazards and medication review.    Overall, the patient's functional ability is noted by this provider to be within normal limits. His level of safety is noted to be within normal limits. His balance/gait is within normal limits. There have been no falls in the past year. Patient-specific home safety recommendations have been reviewed and a copy has been given to patient.   He denies issues with leaking urine.   There are no additional risk factors identified.   Living Will/Advanced Directive has not previously been completed.   Personalized health advice was given to the patient and a written health screening schedule was established; see Plan for details.       Past Medical History  Disease Name Date Onset Notes   Anxiety --  --    CAD (coronary artery disease) --  --    Dizziness --  --    GERD (gastroesophageal reflux disease) --  --    Hypertension --  --    Hypothyroidism --  --    NSTEMI (non-ST elevated myocardial infarction) July 2019 X2   Severe aortic stenosis --  --    Type 2 diabetes mellitus --  --    Vertigo --  --    Vocal fold paralysis, right --  --    Voice hoarseness --  --          Past Surgical History  Procedure Name Date Notes   Direct revascularization heart muscle with catheter stent, prosthesis, or vein graft 2015 --    EGD 2017 --    Open Heart Surgery 24 years ago --          Medication List  Name Date Started Instructions   amlodipine 2.5 mg oral tablet  take 1 tablet (2.5 mg) by oral route once daily for 30 days   aspirin 81 mg oral tablet,chewable  chew 1 tablet (81 mg) by oral route once daily   Carafate 1 gram oral tablet 12/23/2019 take 1 tablet by oral route 2 times a day for 30 days   clopidogrel 75 mg oral tablet 12/23/2019 TAKE 1 TABLET BY MOUTH ONCE DAILY for 90 days   furosemide 20 mg oral tablet 09/23/2019 take 1 tablet (20 mg) by oral route once daily for 90 days   glimepiride 1 mg oral tablet 05/07/2020 take 1 tablet (1 mg) by oral route once daily for 30 days   isosorbide mononitrate 30 mg oral tablet  "extended release 24 hr  take 1 tablet by oral route daily as needed   levothyroxine 88 mcg oral tablet 04/09/2020 TAKE 1 TABLET BY MOUTH ONCE DAILY   lisinopril-hydrochlorothiazide 10-12.5 mg oral tablet 06/11/2020 TAKE 1 TABLET BY MOUTH ONCE DAILY for 90 days   meclizine 25 mg oral tablet 09/06/2018 take 1 tablet by oral route every 8 hours for 30 days   Nitrostat 0.4 mg sublingual tablet, sublingual  place 1 tablet (0.4 mg) by sublingual route every 5 minutes as needed for chest pain   rosuvastatin 20 mg oral tablet 12/31/2019 TAKE 1 TABLET BY MOUTH ONCE DAILY AT BEDTIME   triamcinolone acetonide 0.5 % topical cream 11/27/2019 apply a thin layer to the affected area(s) by topical route 2 times per day for 14 days         Allergy List  Allergen Name Date Reaction Notes   PENICILLINS --  --  --    Protonix --  --  --    SULFA (SULFONAMIDES) --  --  --        Allergies Reconciled  Family Medical History  Disease Name Relative/Age Notes   Stroke Brother/  Father/   --    Alzheimer's Disease Mother/   --    Heart Disease Brother/  Mother/  Sister/   --    Diabetes Brother/  Father/  Sister/   --          Social History  Finding Status Start/Stop Quantity Notes   Alcohol Never --/-- --  --    Recreational Drug Use Never --/-- --  --    Tobacco Never --/-- --  --          Immunizations  NameDate Admin Mfg Trade Name Lot Number Route Inj VIS Given VIS Publication   InfluenzaRefused 06/10/2019 NE Not Entered  NE NE     Comments:    [id number] 08/08/2019 NE Not Entered  NE NE     Comments:    Prevnar 13Refused 08/08/2019 NE Not Entered  NE NE     Comments:          Vitals  Date Time BP Position Site L\R Cuff Size HR RR TEMP (F) WT  HT  BMI kg/m2 BSA m2 O2 Sat HC       06/23/2020 05:02 /80 Sitting    77 - R 16 97.2 174lbs 16oz 5'  8\" 26.61 1.95 97 %              Assessment  · Encounter for Medicare annual wellness exam     V70.0/Z00.00  · Screening for depression     V79.0/Z13.89  · Screening for " alcoholism     V79.1/Z13.39  · Screening for prostate cancer     V76.44/Z12.5  · GERD (gastroesophageal reflux disease)     530.81/K21.9  · Hypertension     401.9/I10  · Hypothyroidism     244.9/E03.9  · Type 2 diabetes mellitus     250.00/E11.9  · Vertigo     780.4/R42  · CAD (coronary artery disease)     414.00/I25.10  · Anxiety     300.02/F41.1    Problems Reconciled  Plan  · Orders  o Falls Risk Assessment Completed (3288F) - V70.0/Z00.00 - 06/23/2020  o Brief hearing screening (written) [facility] () - V70.0/Z00.00 - 06/23/2020  o Annual Wellness Visit-includes a Personalized Prevention Plan of Service (PPS), SUBSEQUENT VISIT (Medicare) () - V70.0/Z00.00 - 06/23/2020  o Presence or absence of urinary incontinence assessed (EMELINA) (1090F) - V70.0/Z00.00 - 06/23/2020  o Annual depression screening using the PHQ-9 tool, 15 minutes (33027, ) - V79.0/Z13.89 - 06/23/2020  o ACO-18: Negative screen for clinical depression using a standardized tool () - V79.0/Z13.89 - 06/23/2020  o Annual alcohol screening using the AUDIT-C tool, 15 minutes [facility] (06477, ) - V79.1/Z13.39 - 06/23/2020  o Negative alcohol screening () - V79.1/Z13.39 - 06/23/2020  o PSA Screening, Ultrasensitive, MEDICARE [facility] () - V76.44/Z12.5 - 06/23/2020  o ACO-39: Current medications updated and reviewed () - - 06/23/2020  o ACO - Pt declines to or was not able to provide an Advance Care Plan or name a Surrogate Decision Maker (1124F) - - 06/23/2020  · Medications  o Medications have been Reconciled  o Transition of Care or Provider Policy  · Instructions  o Health Risk Assessment has been reviewed with the patient.  o Written health screening schedule for next 5-10 years was established with patient; information scanned in chart and given/mailed to patient.  o Fall prevention methods discussed and a copy of recommendations given/mailed to patient.  o Depression Screen completed and scanned into the EMR under the designated  folder within the patient's documents.  o Today's PHQ-9 result is ___1___  · Disposition  o Follow Up in 1 year            Electronically Signed by: KERRY Casas -Author on June 23, 2020 06:02:05 PM

## 2021-05-13 NOTE — PROGRESS NOTES
Quick Note      Patient Name: Andrea Odom   Patient ID: [id number]   Sex: Male   YOB: 1940    Primary Care Provider: Mary PAYNE   Referring Provider: Mary PAYNE    Visit Date: May 5, 2020    Provider: KERRY Casas   Location: [facility]   Location Address: [address] [place] [address]  [id number]   Location Phone: [phone]          History Of Present Illness  TELEHEALTH TELEPHONE VISIT  Chief Complaint: follow up   Andrea Odom is a 80 year old /White male who is presenting for evaluation via telehealth telephone visit. Verbal consent obtained before beginning visit.   Provider spent 8 minutes with the patient during telehealth visit.   The following staff were present during this visit: KERRY Lees and Shanda Marques CMA   Past Medical History/Overview of Patient Symptoms     *Patient consented to consiult via telephone    Follow up on Type 2 Diabetes and CAD. States heart doctor started him on isosorbide mono 30mg once daily prn for chest pain. Patient has Nitro PRN, has had to use it once. Has a follow up in 3 months.     Complains of elevated sugar, requesting steglatro increase to 10mg. States he feels weird when sugar is elevated. Patients last A1C was 5.5. He states that his sugar has been up to 200 through the day and then will come down at night.     Time In: 0916  Time Out: 0924           Assessment  · Diabetes mellitus, type 2     250.00/E11.9    Problems Reconciled  Plan  · Orders  o CMP [facility] (81007) - 250.00/E11.9 - 05/05/2020  o Hgb A1c [facility] (44682) - 250.00/E11.9 - 05/05/2020  o Physican Telephone evaluation, 5-10 min (97224) - - 05/05/2020  · Medications  o Medications have been Reconciled  o Transition of Care or Provider Policy  · Instructions  o Plan Of Care: Before we opt to increase patient's medication we will have him come in tomorrow for an A1c check. Patient voiced understanding all questions answered.  o Chronic  conditions reviewed and taken into consideration for today's treatment plan.  o Patient was educated/instructed on their diagnosis, treatment and medications prior to discharge from the clinic today.  o Patient is taking medications as prescribed and doing well.   o Call the office with any concerns or questions.  o Discussed Covid-19 precautions including, but not limited to, social distancing, avoid touching your face, and hand washing.   · Disposition  o Follow Up PRN     EMR dragon/transcription disclaimer: Much of this encounter note is an electronic transcription/translation of spoken language to printed text.  Electronic translation of spoken language may permit erroneous, or at times nonsensical words or phrases to be inadvertently transcribed; although I have reviewed the note for such errors, some may still exist.             Electronically Signed by: KERRY Casas -Author on May 5, 2020 10:27:10 AM

## 2021-05-13 NOTE — PROGRESS NOTES
Progress Note      Patient Name: Andrea Odom   Patient ID: [id number]   Sex: Male   YOB: 1940    Primary Care Provider: Mary PAYNE   Referring Provider: Mary PAYNE    Visit Date: August 6, 2020    Provider: KERRY Casas   Location: [facility]   Location Address: [address] [place] [address]  [id number]   Location Phone: [phone]          Chief Complaint  · Left knee pain  · Right ear pain      History Of Present Illness  Andrea Odom is a 80 year old /White male who presents for evaluation and treatment of:      Complains of left knee pain for 1.5 weeks, history of gout, pain worse with motion. Worse with bending, and using the steps. No known injury. Has been on Gout medicine in the past.     Complains of right ear pain for few weeks. Mainly the left ear, feels like something crawling in there. States that he can shake it and it will pop.     C/O fatigue and feeling bad requesting to have his Testosterone checked.          Past Medical History  Disease Name Date Onset Notes   Anxiety --  --    CAD (coronary artery disease) --  --    Dizziness --  --    GERD (gastroesophageal reflux disease) --  --    Hypertension --  --    Hypothyroidism --  --    NSTEMI (non-ST elevated myocardial infarction) July 2019 X2   Severe aortic stenosis --  --    Type 2 diabetes mellitus --  --    Vertigo --  --    Vocal fold paralysis, right --  --    Voice hoarseness --  --          Past Surgical History  Procedure Name Date Notes   Direct revascularization heart muscle with catheter stent, prosthesis, or vein graft 2015 --    EGD 2017 --    Open Heart Surgery 24 years ago --          Medication List  Name Date Started Instructions   amlodipine 2.5 mg oral tablet  take 1 tablet (2.5 mg) by oral route once daily for 30 days   aspirin 81 mg oral tablet,chewable  chew 1 tablet (81 mg) by oral route once daily   Carafate 1 gram oral tablet 12/23/2019 take 1 tablet by  oral route 2 times a day for 30 days   clopidogrel 75 mg oral tablet 12/23/2019 TAKE 1 TABLET BY MOUTH ONCE DAILY for 90 days   Euthyrox 88 mcg oral tablet 07/17/2020 TAKE 1 TABLET BY MOUTH ONCE DAILY   glimepiride 1 mg oral tablet 05/07/2020 take 1 tablet (1 mg) by oral route once daily for 30 days   hydroxyzine HCl 50 mg oral tablet 07/01/2020 take 1 tablet (50 mg) by oral route 4 times per day for 30 days   isosorbide mononitrate 30 mg oral tablet extended release 24 hr  take 1 tablet by oral route daily as needed   lisinopril-hydrochlorothiazide 10-12.5 mg oral tablet 06/11/2020 TAKE 1 TABLET BY MOUTH ONCE DAILY for 90 days   meclizine 25 mg oral tablet 09/06/2018 take 1 tablet by oral route every 8 hours for 30 days   Nitrostat 0.4 mg sublingual tablet, sublingual  place 1 tablet (0.4 mg) by sublingual route every 5 minutes as needed for chest pain   rosuvastatin 20 mg oral tablet 12/31/2019 TAKE 1 TABLET BY MOUTH ONCE DAILY AT BEDTIME   triamcinolone acetonide 0.5 % topical cream 11/27/2019 apply a thin layer to the affected area(s) by topical route 2 times per day for 14 days         Allergy List  Allergen Name Date Reaction Notes   PENICILLINS --  --  --    Protonix --  --  --    SULFA (SULFONAMIDES) --  --  --        Allergies Reconciled  Family Medical History  Disease Name Relative/Age Notes   Stroke Brother/  Father/   --    Alzheimer's Disease Mother/   --    Heart Disease Brother/  Mother/  Sister/   --    Diabetes Brother/  Father/  Sister/   --          Social History  Finding Status Start/Stop Quantity Notes   Alcohol Never --/-- --  --    Recreational Drug Use Never --/-- --  --    Tobacco Never --/-- --  --          Immunizations  NameDate Admin Mfg Trade Name Lot Number Route Inj VIS Given VIS Publication   InfluenzaRefused 06/10/2019 NE Not Entered  NE NE     Comments:    [id number] 08/08/2019 NE Not Entered  NE NE     Comments:    Prevnar 13Refused 08/08/2019 NE Not Entered  NE NE    "  Comments:          Review of Systems  · Constitutional  o Admits  o : fatigue  o Denies  o : sick contacts, fever, chills, weakness  · HENT  o Admits  o : ear pain, ear fullness  · Cardiovascular  o Admits  o : pain in chest  o Denies  o : dypnea on exertion  · Respiratory  o Denies  o : shortness of breath, cough  · Gastrointestinal  o Denies  o : diarrhea, constipation  · Genitourinary  o Denies  o : urgency, frequency  · Musculoskeletal  o Admits  o : joint pain, joint swelling, knee pain      Vitals  Date Time BP Position Site L\R Cuff Size HR RR TEMP (F) WT  HT  BMI kg/m2 BSA m2 O2 Sat HC       08/06/2020 11:56 /80 Sitting    71 - R 16 97.4 177lbs 4oz 5'  8\" 26.95 1.96 95 %          Physical Examination  · Constitutional  o Appearance  o : well-nourished, well developed, alert, in no acute distress, well-tended appearance  · Head and Face  o Head  o :   § Inspection  § : atraumatic, normocephalic  · Eyes  o Eyes  o : extraocular movements intact, no scleral icterus, no conjunctival injection  · Ears, Nose, Mouth and Throat  o Ears  o :   § External Ears  § : normal  § Otoscopic Examination  § : fluid bubbles present behind tympanic membrane-right ear   o Nose  o :   § Intranasal Exam  § : nares patent  o Oral Cavity  o :   § Oral Mucosa  § : moist mucous membranes  o Throat  o :   § Oropharynx  § : no inflammation or lesions present, tonsils within normal limits  · Respiratory  o Respiratory Effort  o : breathing comfortably, symmetric chest rise  o Auscultation of Lungs  o : clear to asculatation bilaterally, no wheezes, rales, or rhonchii  · Cardiovascular  o Heart  o :   § Auscultation of Heart  § : regular rate and rhythm, no murmurs, rubs, or gallops  o Peripheral Vascular System  o :   § Extremities  § : no edema  · Gastrointestinal  o Abdominal Examination  o :   § Abdomen  § : bowel sounds present, non-distended, non-tender  · Lymphatic  o Neck  o : no lymphadenopathy " present  · Neurologic  o Mental Status Examination  o :   § Orientation  § : grossly oriented to person, place and time  o Gait and Station  o :   § Gait Screening  § : normal gait  · Psychiatric  o General  o : normal mood and affect  · Left Knee  o Inspection  o : redness present, swelling present, no deformity, no bruising, no effusion  o Palpation  o : patellar tendon tenderness present, crepitus present, pain with patella compression          Assessment  · Allergic rhinitis due to allergen     477.9/J30.9  · Fatigue     780.79/R53.83  · Left knee pain     719.46/M25.562  · History of gout     V12.29/Z87.39    Problems Reconciled  Plan  · Orders  o ACO-39: Current medications updated and reviewed () - - 08/06/2020  o Knee (Left) 3 views X-Ray [facility] (13922-VL) - 719.46/M25.562 - 08/06/2020  o Uric Acid Serum [facility] (80093) - 719.46/M25.562, V12.29/Z87.39 - 08/06/2020  o Testosterone (Total) (29712) - 780.79/R53.83 - 08/06/2020  · Medications  o Zyrtec 10 mg oral tablet   SIG: take 1 tablet (10 mg) by oral route once daily for 30 days   DISP: (30) tablets with 11 refills  Prescribed on 08/06/2020     o Medications have been Reconciled  o Transition of Care or Provider Policy  · Instructions  o Take all medications as prescribed/directed.  o Patient was educated/instructed on their diagnosis, treatment and medications prior to discharge from the clinic today.  o Patient instructed to seek medical attention urgently for new or worsening symptoms.  o Call the office with any concerns or questions.  o Flu vaccine declined.  o We will check a uric acid level today. We also gave patient order to get an x-ray of that knee. He will get the x-ray if his uric acid is negative. Attempted to give patient some prednisone as it would help both with gout and osteoarthritis patient defers at this time states the last time he took a steroid his blood sugar shot up very high.  o Ear pain appears to be likely allergic  rhinitis, I will send him in some Zyrtec to take once daily.  · Disposition  o Follow Up PRN     I will see Andrea back on an as-needed basis, he can keep his regular scheduled appointment for the end of September.  Please call the office if there is no improvement in his symptoms.    EMR dragon/transcription disclaimer: Much of this encounter note is an electronic transcription/translation of spoken language to printed text.  Electronic translation of spoken language may permit erroneous, or at times nonsensical words or phrases to be inadvertently transcribed; although I have reviewed the note for such errors, some may still exist.             Electronically Signed by: KERRY Casas -Author on August 6, 2020 11:24:05 AM

## 2021-05-15 VITALS
HEART RATE: 77 BPM | BODY MASS INDEX: 26.52 KG/M2 | TEMPERATURE: 97.2 F | DIASTOLIC BLOOD PRESSURE: 80 MMHG | RESPIRATION RATE: 16 BRPM | HEIGHT: 68 IN | WEIGHT: 175 LBS | SYSTOLIC BLOOD PRESSURE: 108 MMHG | OXYGEN SATURATION: 97 %

## 2021-05-15 VITALS
TEMPERATURE: 97.6 F | OXYGEN SATURATION: 98 % | SYSTOLIC BLOOD PRESSURE: 110 MMHG | WEIGHT: 177.5 LBS | HEART RATE: 75 BPM | DIASTOLIC BLOOD PRESSURE: 60 MMHG | HEIGHT: 68 IN | RESPIRATION RATE: 16 BRPM | BODY MASS INDEX: 26.9 KG/M2

## 2021-05-15 VITALS
BODY MASS INDEX: 26.14 KG/M2 | SYSTOLIC BLOOD PRESSURE: 98 MMHG | DIASTOLIC BLOOD PRESSURE: 60 MMHG | OXYGEN SATURATION: 96 % | WEIGHT: 172.5 LBS | TEMPERATURE: 97.3 F | HEART RATE: 78 BPM | HEIGHT: 68 IN | RESPIRATION RATE: 16 BRPM

## 2021-05-15 VITALS
TEMPERATURE: 98.3 F | WEIGHT: 174.37 LBS | SYSTOLIC BLOOD PRESSURE: 122 MMHG | HEART RATE: 80 BPM | RESPIRATION RATE: 18 BRPM | DIASTOLIC BLOOD PRESSURE: 78 MMHG | OXYGEN SATURATION: 97 % | HEIGHT: 68 IN | BODY MASS INDEX: 26.43 KG/M2

## 2021-05-15 VITALS
OXYGEN SATURATION: 96 % | HEART RATE: 84 BPM | SYSTOLIC BLOOD PRESSURE: 160 MMHG | WEIGHT: 176 LBS | RESPIRATION RATE: 18 BRPM | DIASTOLIC BLOOD PRESSURE: 80 MMHG | DIASTOLIC BLOOD PRESSURE: 74 MMHG | BODY MASS INDEX: 26.67 KG/M2 | HEIGHT: 68 IN | SYSTOLIC BLOOD PRESSURE: 140 MMHG | TEMPERATURE: 97.7 F

## 2021-05-15 VITALS
OXYGEN SATURATION: 94 % | TEMPERATURE: 97.5 F | HEIGHT: 68 IN | RESPIRATION RATE: 18 BRPM | DIASTOLIC BLOOD PRESSURE: 80 MMHG | HEART RATE: 83 BPM | SYSTOLIC BLOOD PRESSURE: 110 MMHG | BODY MASS INDEX: 26.84 KG/M2 | WEIGHT: 177.12 LBS

## 2021-05-15 VITALS
WEIGHT: 181 LBS | HEART RATE: 75 BPM | RESPIRATION RATE: 16 BRPM | HEIGHT: 68 IN | OXYGEN SATURATION: 98 % | TEMPERATURE: 97.5 F | BODY MASS INDEX: 27.43 KG/M2 | SYSTOLIC BLOOD PRESSURE: 128 MMHG | DIASTOLIC BLOOD PRESSURE: 74 MMHG

## 2021-05-15 VITALS
RESPIRATION RATE: 18 BRPM | SYSTOLIC BLOOD PRESSURE: 122 MMHG | TEMPERATURE: 98.4 F | WEIGHT: 178 LBS | DIASTOLIC BLOOD PRESSURE: 60 MMHG | OXYGEN SATURATION: 97 % | BODY MASS INDEX: 26.98 KG/M2 | HEIGHT: 68 IN | HEART RATE: 79 BPM

## 2021-05-15 VITALS
SYSTOLIC BLOOD PRESSURE: 122 MMHG | HEIGHT: 68 IN | HEART RATE: 85 BPM | DIASTOLIC BLOOD PRESSURE: 60 MMHG | RESPIRATION RATE: 16 BRPM | OXYGEN SATURATION: 95 % | WEIGHT: 176.25 LBS | BODY MASS INDEX: 26.71 KG/M2 | TEMPERATURE: 97.8 F

## 2021-05-15 VITALS
SYSTOLIC BLOOD PRESSURE: 130 MMHG | RESPIRATION RATE: 16 BRPM | TEMPERATURE: 97.4 F | HEIGHT: 68 IN | OXYGEN SATURATION: 95 % | WEIGHT: 177.25 LBS | BODY MASS INDEX: 26.86 KG/M2 | HEART RATE: 71 BPM | DIASTOLIC BLOOD PRESSURE: 80 MMHG

## 2021-05-15 VITALS
HEIGHT: 68 IN | WEIGHT: 175 LBS | BODY MASS INDEX: 26.52 KG/M2 | SYSTOLIC BLOOD PRESSURE: 100 MMHG | OXYGEN SATURATION: 98 % | RESPIRATION RATE: 16 BRPM | DIASTOLIC BLOOD PRESSURE: 58 MMHG | HEART RATE: 76 BPM | TEMPERATURE: 98 F

## 2021-05-15 VITALS
HEART RATE: 78 BPM | DIASTOLIC BLOOD PRESSURE: 63 MMHG | OXYGEN SATURATION: 100 % | WEIGHT: 180.56 LBS | TEMPERATURE: 98 F | RESPIRATION RATE: 16 BRPM | SYSTOLIC BLOOD PRESSURE: 173 MMHG | BODY MASS INDEX: 27.36 KG/M2 | HEIGHT: 68 IN

## 2021-05-15 VITALS
HEART RATE: 66 BPM | SYSTOLIC BLOOD PRESSURE: 112 MMHG | HEIGHT: 68 IN | TEMPERATURE: 97.3 F | RESPIRATION RATE: 16 BRPM | BODY MASS INDEX: 27.13 KG/M2 | DIASTOLIC BLOOD PRESSURE: 64 MMHG | WEIGHT: 179 LBS | OXYGEN SATURATION: 97 %

## 2021-05-15 VITALS
HEART RATE: 79 BPM | SYSTOLIC BLOOD PRESSURE: 120 MMHG | WEIGHT: 182.25 LBS | BODY MASS INDEX: 27.62 KG/M2 | HEIGHT: 68 IN | DIASTOLIC BLOOD PRESSURE: 72 MMHG | TEMPERATURE: 97.6 F | RESPIRATION RATE: 18 BRPM | OXYGEN SATURATION: 97 %

## 2021-05-16 VITALS
DIASTOLIC BLOOD PRESSURE: 58 MMHG | SYSTOLIC BLOOD PRESSURE: 110 MMHG | HEART RATE: 78 BPM | OXYGEN SATURATION: 95 % | TEMPERATURE: 97.2 F | HEIGHT: 68 IN | BODY MASS INDEX: 28.19 KG/M2 | WEIGHT: 186 LBS | RESPIRATION RATE: 18 BRPM

## 2021-05-16 VITALS
OXYGEN SATURATION: 96 % | HEIGHT: 68 IN | SYSTOLIC BLOOD PRESSURE: 92 MMHG | TEMPERATURE: 97.6 F | HEART RATE: 81 BPM | RESPIRATION RATE: 18 BRPM | BODY MASS INDEX: 27.89 KG/M2 | WEIGHT: 184 LBS | DIASTOLIC BLOOD PRESSURE: 62 MMHG

## 2021-05-16 VITALS
OXYGEN SATURATION: 98 % | TEMPERATURE: 98 F | BODY MASS INDEX: 27.94 KG/M2 | HEIGHT: 68 IN | DIASTOLIC BLOOD PRESSURE: 82 MMHG | RESPIRATION RATE: 16 BRPM | SYSTOLIC BLOOD PRESSURE: 152 MMHG | WEIGHT: 184.37 LBS | HEART RATE: 70 BPM

## 2021-05-16 VITALS
SYSTOLIC BLOOD PRESSURE: 140 MMHG | HEART RATE: 81 BPM | OXYGEN SATURATION: 98 % | DIASTOLIC BLOOD PRESSURE: 69 MMHG | TEMPERATURE: 97.9 F | RESPIRATION RATE: 17 BRPM

## 2022-06-09 ENCOUNTER — HOSPITAL ENCOUNTER (EMERGENCY)
Facility: HOSPITAL | Age: 82
Discharge: HOME | End: 2022-06-09
Attending: EMERGENCY MEDICINE | Admitting: EMERGENCY MEDICINE

## 2022-06-09 VITALS
HEART RATE: 63 BPM | HEIGHT: 69 IN | TEMPERATURE: 97.6 F | OXYGEN SATURATION: 98 % | WEIGHT: 173.06 LBS | SYSTOLIC BLOOD PRESSURE: 129 MMHG | RESPIRATION RATE: 18 BRPM | BODY MASS INDEX: 25.63 KG/M2 | DIASTOLIC BLOOD PRESSURE: 67 MMHG

## 2022-06-09 DIAGNOSIS — I10 HYPERTENSION, UNSPECIFIED TYPE: Primary | ICD-10-CM

## 2022-06-09 LAB
ALBUMIN SERPL-MCNC: 5.3 G/DL (ref 3.5–5.2)
ALBUMIN/GLOB SERPL: 2.1 G/DL
ALP SERPL-CCNC: 87 U/L (ref 39–117)
ALT SERPL W P-5'-P-CCNC: 9 U/L (ref 1–41)
ANION GAP SERPL CALCULATED.3IONS-SCNC: 11.8 MMOL/L (ref 5–15)
AST SERPL-CCNC: 18 U/L (ref 1–40)
BASOPHILS # BLD AUTO: 0.03 10*3/MM3 (ref 0–0.2)
BASOPHILS NFR BLD AUTO: 0.5 % (ref 0–1.5)
BILIRUB SERPL-MCNC: 0.6 MG/DL (ref 0–1.2)
BILIRUB UR QL STRIP: NEGATIVE
BUN SERPL-MCNC: 15 MG/DL (ref 8–23)
BUN/CREAT SERPL: 12.4 (ref 7–25)
CALCIUM SPEC-SCNC: 10.4 MG/DL (ref 8.6–10.5)
CHLORIDE SERPL-SCNC: 98 MMOL/L (ref 98–107)
CLARITY UR: CLEAR
CO2 SERPL-SCNC: 27.2 MMOL/L (ref 22–29)
COLOR UR: YELLOW
CREAT SERPL-MCNC: 1.21 MG/DL (ref 0.76–1.27)
DEPRECATED RDW RBC AUTO: 43 FL (ref 37–54)
EGFRCR SERPLBLD CKD-EPI 2021: 59.8 ML/MIN/1.73
EOSINOPHIL # BLD AUTO: 0.05 10*3/MM3 (ref 0–0.4)
EOSINOPHIL NFR BLD AUTO: 0.8 % (ref 0.3–6.2)
ERYTHROCYTE [DISTWIDTH] IN BLOOD BY AUTOMATED COUNT: 12.7 % (ref 12.3–15.4)
GLOBULIN UR ELPH-MCNC: 2.5 GM/DL
GLUCOSE SERPL-MCNC: 93 MG/DL (ref 65–99)
GLUCOSE UR STRIP-MCNC: NEGATIVE MG/DL
HCT VFR BLD AUTO: 42.1 % (ref 37.5–51)
HGB BLD-MCNC: 14 G/DL (ref 13–17.7)
HGB UR QL STRIP.AUTO: NEGATIVE
HOLD SPECIMEN: NORMAL
HOLD SPECIMEN: NORMAL
IMM GRANULOCYTES # BLD AUTO: 0.01 10*3/MM3 (ref 0–0.05)
IMM GRANULOCYTES NFR BLD AUTO: 0.2 % (ref 0–0.5)
KETONES UR QL STRIP: NEGATIVE
LEUKOCYTE ESTERASE UR QL STRIP.AUTO: NEGATIVE
LYMPHOCYTES # BLD AUTO: 1.3 10*3/MM3 (ref 0.7–3.1)
LYMPHOCYTES NFR BLD AUTO: 21.5 % (ref 19.6–45.3)
MCH RBC QN AUTO: 30.6 PG (ref 26.6–33)
MCHC RBC AUTO-ENTMCNC: 33.3 G/DL (ref 31.5–35.7)
MCV RBC AUTO: 92.1 FL (ref 79–97)
MONOCYTES # BLD AUTO: 0.45 10*3/MM3 (ref 0.1–0.9)
MONOCYTES NFR BLD AUTO: 7.4 % (ref 5–12)
NEUTROPHILS NFR BLD AUTO: 4.22 10*3/MM3 (ref 1.7–7)
NEUTROPHILS NFR BLD AUTO: 69.6 % (ref 42.7–76)
NITRITE UR QL STRIP: NEGATIVE
NRBC BLD AUTO-RTO: 0 /100 WBC (ref 0–0.2)
PH UR STRIP.AUTO: 7.5 [PH] (ref 5–8)
PLATELET # BLD AUTO: 139 10*3/MM3 (ref 140–450)
PMV BLD AUTO: 11.3 FL (ref 6–12)
POTASSIUM SERPL-SCNC: 4.1 MMOL/L (ref 3.5–5.2)
PROT SERPL-MCNC: 7.8 G/DL (ref 6–8.5)
PROT UR QL STRIP: NEGATIVE
RBC # BLD AUTO: 4.57 10*6/MM3 (ref 4.14–5.8)
SODIUM SERPL-SCNC: 137 MMOL/L (ref 136–145)
SP GR UR STRIP: 1.01 (ref 1–1.03)
UROBILINOGEN UR QL STRIP: NORMAL
WBC NRBC COR # BLD: 6.06 10*3/MM3 (ref 3.4–10.8)
WHOLE BLOOD HOLD COAG: NORMAL
WHOLE BLOOD HOLD SPECIMEN: NORMAL

## 2022-06-09 PROCEDURE — 81003 URINALYSIS AUTO W/O SCOPE: CPT

## 2022-06-09 PROCEDURE — 36415 COLL VENOUS BLD VENIPUNCTURE: CPT

## 2022-06-09 PROCEDURE — 80053 COMPREHEN METABOLIC PANEL: CPT | Performed by: EMERGENCY MEDICINE

## 2022-06-09 PROCEDURE — 99283 EMERGENCY DEPT VISIT LOW MDM: CPT

## 2022-06-09 PROCEDURE — 85025 COMPLETE CBC W/AUTO DIFF WBC: CPT | Performed by: EMERGENCY MEDICINE

## 2022-06-09 RX ORDER — AMLODIPINE BESYLATE 5 MG/1
5 TABLET ORAL DAILY
COMMUNITY

## 2022-06-09 RX ORDER — SODIUM CHLORIDE 0.9 % (FLUSH) 0.9 %
10 SYRINGE (ML) INJECTION AS NEEDED
Status: DISCONTINUED | OUTPATIENT
Start: 2022-06-09 | End: 2022-06-09 | Stop reason: HOSPADM

## 2022-06-09 RX ORDER — CETIRIZINE HYDROCHLORIDE 10 MG/1
10 TABLET ORAL DAILY
COMMUNITY
Start: 2022-01-20 | End: 2023-01-21

## 2022-06-09 RX ORDER — LEVOTHYROXINE SODIUM 0.07 MG/1
75 TABLET ORAL DAILY
COMMUNITY
Start: 2022-04-25

## 2022-06-09 RX ORDER — GLIMEPIRIDE 2 MG/1
2 TABLET ORAL DAILY
COMMUNITY
Start: 2022-05-27

## 2022-06-09 RX ORDER — LISINOPRIL 20 MG/1
20 TABLET ORAL DAILY
COMMUNITY
End: 2022-08-10

## 2022-06-09 RX ORDER — ASPIRIN 81 MG/1
81 TABLET, CHEWABLE ORAL DAILY
COMMUNITY

## 2022-06-09 NOTE — ED PROVIDER NOTES
"Time: 2:04 PM EDT  Arrived by: private car  Chief Complaint: Hypertension  History provided by: Patient  History is limited by: N/A     History of Present Illness:  Patient is a 82 y.o. male that presents to the emergency department with hypertension. The pt reports that since yesterday morning, his BP would drop and then go right back up after an hour. He has not had a change in BP medications for the past couple of years. The pt does have stents in place and had one of them cleaned out last year. The pt's symptoms are moderate in severity and have no other reported modifying factors.     He does note during one of these episodes, he developed CP that went away with two nitros. He has not had CP since.       History provided by:  Patient   used: No        Similar Symptoms Previously: Hx of HTN  Recently seen: N/a      Patient Care Team  Primary Care Provider: Mary Moya APRN    Past Medical History:     Allergies   Allergen Reactions   • Pantoprazole Unknown - Low Severity   • Penicillins Dizziness          • Sulfa Antibiotics Other (See Comments)     \"FEEL BAD\"     Past Medical History:   Diagnosis Date   • Diabetes mellitus (HCC)    • Hypertension      Past Surgical History:   Procedure Laterality Date   • CARDIAC SURGERY       History reviewed. No pertinent family history.    Home Medications:  Prior to Admission medications    Medication Sig Start Date End Date Taking? Authorizing Provider   cetirizine (zyrTEC) 10 MG tablet Take 10 mg by mouth Daily. 1/20/22 1/21/23 Yes ProviderZurdo MD   glimepiride (AMARYL) 2 MG tablet Take 2 mg by mouth Daily. 5/27/22  Yes ProviderZurdo MD   levothyroxine (SYNTHROID, LEVOTHROID) 88 MCG tablet Take 1 tablet by mouth Daily. 4/25/22  Yes ProviderZurdo MD   metFORMIN (GLUCOPHAGE) 500 MG tablet Take 500 mg by mouth. 3/14/22 3/15/23 Yes Provider, MD Zurdo   amLODIPine (NORVASC) 2.5 MG tablet Take 2.5 mg by mouth.    " "Zurdo Holloway MD   aspirin 81 MG chewable tablet aspirin 81 mg oral tablet,chewable chew 1 tablet (81 mg) by oral route once daily   Active    ProviderZurdo MD   cyanocobalamin (VITAMIN B-12) 500 MCG tablet Take 500 mcg by mouth Daily.    Zurdo Holloway MD   lisinopril (PRINIVIL,ZESTRIL) 20 MG tablet Take 20 mg by mouth Daily.    Zurdo Holloway MD        Social History:   Social History     Tobacco Use   • Smoking status: Never Smoker   Substance Use Topics   • Alcohol use: Never   • Drug use: Never     Recent travel: no     Review of Systems:  Review of Systems   Constitutional: Negative for chills and fever.   HENT: Negative for congestion, rhinorrhea and sore throat.    Eyes: Negative for pain and visual disturbance.   Respiratory: Negative for apnea, cough, chest tightness and shortness of breath.    Cardiovascular: Positive for chest pain (Resolved). Negative for palpitations.        Hypertension   Gastrointestinal: Negative for abdominal pain, diarrhea, nausea and vomiting.   Genitourinary: Negative for difficulty urinating and dysuria.   Musculoskeletal: Negative for joint swelling and myalgias.   Skin: Negative for color change.   Neurological: Negative for seizures and headaches.   Psychiatric/Behavioral: Negative.    All other systems reviewed and are negative.       Physical Exam:  /67   Pulse 63   Temp 97.6 °F (36.4 °C) (Oral)   Resp 18   Ht 175.3 cm (69\")   Wt 78.5 kg (173 lb 1 oz)   SpO2 98%   BMI 25.56 kg/m²     Physical Exam  Vitals and nursing note reviewed.   Constitutional:       General: He is not in acute distress.     Appearance: Normal appearance. He is not toxic-appearing.   HENT:      Head: Normocephalic and atraumatic.      Jaw: There is normal jaw occlusion.   Eyes:      General: Lids are normal.      Extraocular Movements: Extraocular movements intact.      Conjunctiva/sclera: Conjunctivae normal.      Pupils: Pupils are equal, round, and " reactive to light.   Cardiovascular:      Rate and Rhythm: Normal rate and regular rhythm.      Pulses: Normal pulses.      Heart sounds: Murmur (Slight) heard.   Pulmonary:      Effort: Pulmonary effort is normal. No respiratory distress.      Breath sounds: Normal breath sounds. No wheezing or rhonchi.   Abdominal:      General: Abdomen is flat.      Palpations: Abdomen is soft.      Tenderness: There is no abdominal tenderness. There is no guarding or rebound.   Musculoskeletal:         General: Normal range of motion.      Cervical back: Normal range of motion and neck supple.      Right lower leg: Edema (Trace, pitting) present.      Left lower leg: Edema (Trace, pitting) present.   Skin:     General: Skin is warm and dry.   Neurological:      Mental Status: He is alert and oriented to person, place, and time. Mental status is at baseline.   Psychiatric:         Mood and Affect: Mood normal.                Medications in the Emergency Department:  Medications   sodium chloride 0.9 % flush 10 mL (has no administration in time range)        Labs  Lab Results (last 24 hours)     Procedure Component Value Units Date/Time    Urinalysis With Microscopic If Indicated (No Culture) - Urine, Clean Catch [[id number]]  (Normal) Collected: 06/09/22 1343    Specimen: Urine, Clean Catch Updated: 06/09/22 1400     Color, UA Yellow     Appearance, UA Clear     pH, UA 7.5     Specific Gravity, UA 1.008     Glucose, UA Negative     Ketones, UA Negative     Bilirubin, UA Negative     Blood, UA Negative     Protein, UA Negative     Leuk Esterase, UA Negative     Nitrite, UA Negative     Urobilinogen, UA 1.0 E.U./dL    Narrative:      Urine microscopic not indicated.    CBC & Differential [[id number]]  (Abnormal) Collected: 06/09/22 1352    Specimen: Blood Updated: 06/09/22 1430    Narrative:      The following orders were created for panel order CBC & Differential.  Procedure                               Abnormality         Status                      ---------                               -----------         ------                     CBC Auto Differential[[id number]]        Abnormal            Final result                 Please view results for these tests on the individual orders.    Comprehensive Metabolic Panel [[id number]]  (Abnormal) Collected: 06/09/22 1352    Specimen: Blood Updated: 06/09/22 1441     Glucose 93 mg/dL      BUN 15 mg/dL      Creatinine 1.21 mg/dL      Sodium 137 mmol/L      Potassium 4.1 mmol/L      Chloride 98 mmol/L      CO2 27.2 mmol/L      Calcium 10.4 mg/dL      Total Protein 7.8 g/dL      Albumin 5.30 g/dL      ALT (SGPT) 9 U/L      AST (SGOT) 18 U/L      Alkaline Phosphatase 87 U/L      Total Bilirubin 0.6 mg/dL      Globulin 2.5 gm/dL      A/G Ratio 2.1 g/dL      BUN/Creatinine Ratio 12.4     Anion Gap 11.8 mmol/L      eGFR 59.8 mL/min/1.73      Comment: National Kidney Foundation and American Society of Nephrology (ASN) Task Force recommended calculation based on the Chronic Kidney Disease Epidemiology Collaboration (CKD-EPI) equation refit without adjustment for race.       Narrative:      GFR Normal >60  Chronic Kidney Disease <60  Kidney Failure <15      CBC Auto Differential [[id number]]  (Abnormal) Collected: 06/09/22 1352    Specimen: Blood Updated: 06/09/22 1430     WBC 6.06 10*3/mm3      RBC 4.57 10*6/mm3      Hemoglobin 14.0 g/dL      Hematocrit 42.1 %      MCV 92.1 fL      MCH 30.6 pg      MCHC 33.3 g/dL      RDW 12.7 %      RDW-SD 43.0 fl      MPV 11.3 fL      Platelets 139 10*3/mm3      Neutrophil % 69.6 %      Lymphocyte % 21.5 %      Monocyte % 7.4 %      Eosinophil % 0.8 %      Basophil % 0.5 %      Immature Grans % 0.2 %      Neutrophils, Absolute 4.22 10*3/mm3      Lymphocytes, Absolute 1.30 10*3/mm3      Monocytes, Absolute 0.45 10*3/mm3      Eosinophils, Absolute 0.05 10*3/mm3      Basophils, Absolute 0.03 10*3/mm3      Immature Grans, Absolute 0.01 10*3/mm3      nRBC 0.0 /100 WBC             Imaging:  No Radiology Exams Resulted Within Past 24 Hours    Procedures:  Procedures    Progress                            Medical Decision Making:  MDM  Number of Diagnoses or Management Options  Hypertension, unspecified type  Diagnosis management comments: In-service is a 2-year-old male who presents emerged department complaining of uncontrolled high blood pressure.  Blood pressure readings in the emergency department are within normal limits patient has no symptoms.  CBC unremarkable, CMP is unremarkable, urinalysis is unremarkable.  Very strict return to ER and follow-up instructions have been provided to the patient.         Final diagnoses:   Hypertension, unspecified type        Disposition:  ED Disposition     ED Disposition   Discharge    Condition   Stable    Comment   --             Documentation assistance provided by Phil Cardoso acting as scribe for Andres Quintanilla MD  . Information recorded by the scribe was done at my direction and has been verified and validated by me.        Phil Cardoso  06/09/22 0636       Andres Quintanilla MD  06/09/22 6195

## 2022-08-10 ENCOUNTER — HOSPITAL ENCOUNTER (INPATIENT)
Facility: HOSPITAL | Age: 82
LOS: 1 days | Discharge: ANOTHER HOSPITAL | End: 2022-08-11
Attending: EMERGENCY MEDICINE | Admitting: INTERNAL MEDICINE

## 2022-08-10 ENCOUNTER — APPOINTMENT (OUTPATIENT)
Dept: GENERAL RADIOLOGY | Facility: HOSPITAL | Age: 82
End: 2022-08-10

## 2022-08-10 ENCOUNTER — APPOINTMENT (OUTPATIENT)
Dept: CARDIOLOGY | Facility: HOSPITAL | Age: 82
End: 2022-08-10

## 2022-08-10 DIAGNOSIS — I21.4 NSTEMI (NON-ST ELEVATED MYOCARDIAL INFARCTION): Primary | ICD-10-CM

## 2022-08-10 DIAGNOSIS — R07.9 ACUTE CHEST PAIN: ICD-10-CM

## 2022-08-10 DIAGNOSIS — I20.0 UNSTABLE ANGINA: ICD-10-CM

## 2022-08-10 LAB
ALBUMIN SERPL-MCNC: 4.4 G/DL (ref 3.5–5.2)
ALBUMIN/GLOB SERPL: 2.4 G/DL
ALP SERPL-CCNC: 61 U/L (ref 39–117)
ALT SERPL W P-5'-P-CCNC: 17 U/L (ref 1–41)
ANION GAP SERPL CALCULATED.3IONS-SCNC: 8.7 MMOL/L (ref 5–15)
APTT PPP: 27.1 SECONDS (ref 24.2–34.2)
APTT PPP: 38.6 SECONDS (ref 24.2–34.2)
APTT PPP: 42.1 SECONDS (ref 24.2–34.2)
APTT PPP: >200 SECONDS (ref 24.2–34.2)
AST SERPL-CCNC: 15 U/L (ref 1–40)
BASOPHILS # BLD AUTO: 0.02 10*3/MM3 (ref 0–0.2)
BASOPHILS NFR BLD AUTO: 0.3 % (ref 0–1.5)
BH CV LOWER VASCULAR LEFT COMMON FEMORAL AUGMENT: NORMAL
BH CV LOWER VASCULAR LEFT COMMON FEMORAL COMPETENT: NORMAL
BH CV LOWER VASCULAR LEFT COMMON FEMORAL COMPRESS: NORMAL
BH CV LOWER VASCULAR LEFT COMMON FEMORAL PHASIC: NORMAL
BH CV LOWER VASCULAR LEFT COMMON FEMORAL SPONT: NORMAL
BH CV LOWER VASCULAR LEFT DISTAL FEMORAL COMPRESS: NORMAL
BH CV LOWER VASCULAR LEFT GASTRONEMIUS COMPRESS: NORMAL
BH CV LOWER VASCULAR LEFT LESSER SAPH COMPRESS: NORMAL
BH CV LOWER VASCULAR LEFT MID FEMORAL AUGMENT: NORMAL
BH CV LOWER VASCULAR LEFT MID FEMORAL COMPETENT: NORMAL
BH CV LOWER VASCULAR LEFT MID FEMORAL COMPRESS: NORMAL
BH CV LOWER VASCULAR LEFT MID FEMORAL PHASIC: NORMAL
BH CV LOWER VASCULAR LEFT MID FEMORAL SPONT: NORMAL
BH CV LOWER VASCULAR LEFT PERONEAL COMPRESS: NORMAL
BH CV LOWER VASCULAR LEFT POPLITEAL AUGMENT: NORMAL
BH CV LOWER VASCULAR LEFT POPLITEAL COMPETENT: NORMAL
BH CV LOWER VASCULAR LEFT POPLITEAL COMPRESS: NORMAL
BH CV LOWER VASCULAR LEFT POPLITEAL PHASIC: NORMAL
BH CV LOWER VASCULAR LEFT POPLITEAL SPONT: NORMAL
BH CV LOWER VASCULAR LEFT POSTERIOR TIBIAL COMPRESS: NORMAL
BH CV LOWER VASCULAR LEFT PROXIMAL FEMORAL COMPRESS: NORMAL
BH CV LOWER VASCULAR LEFT SAPHENOFEMORAL JUNCTION COMPRESS: NORMAL
BH CV LOWER VASCULAR RIGHT COMMON FEMORAL AUGMENT: NORMAL
BH CV LOWER VASCULAR RIGHT COMMON FEMORAL COMPETENT: NORMAL
BH CV LOWER VASCULAR RIGHT COMMON FEMORAL COMPRESS: NORMAL
BH CV LOWER VASCULAR RIGHT COMMON FEMORAL PHASIC: NORMAL
BH CV LOWER VASCULAR RIGHT COMMON FEMORAL SPONT: NORMAL
BH CV LOWER VASCULAR RIGHT DISTAL FEMORAL COMPRESS: NORMAL
BH CV LOWER VASCULAR RIGHT GASTRONEMIUS COMPRESS: NORMAL
BH CV LOWER VASCULAR RIGHT GREATER SAPH AK COMPRESS: NORMAL
BH CV LOWER VASCULAR RIGHT GREATER SAPH BK COMPRESS: NORMAL
BH CV LOWER VASCULAR RIGHT LESSER SAPH COMPRESS: NORMAL
BH CV LOWER VASCULAR RIGHT MID FEMORAL AUGMENT: NORMAL
BH CV LOWER VASCULAR RIGHT MID FEMORAL COMPETENT: NORMAL
BH CV LOWER VASCULAR RIGHT MID FEMORAL COMPRESS: NORMAL
BH CV LOWER VASCULAR RIGHT MID FEMORAL PHASIC: NORMAL
BH CV LOWER VASCULAR RIGHT MID FEMORAL SPONT: NORMAL
BH CV LOWER VASCULAR RIGHT PERONEAL COMPRESS: NORMAL
BH CV LOWER VASCULAR RIGHT POPLITEAL AUGMENT: NORMAL
BH CV LOWER VASCULAR RIGHT POPLITEAL COMPETENT: NORMAL
BH CV LOWER VASCULAR RIGHT POPLITEAL COMPRESS: NORMAL
BH CV LOWER VASCULAR RIGHT POPLITEAL PHASIC: NORMAL
BH CV LOWER VASCULAR RIGHT POPLITEAL SPONT: NORMAL
BH CV LOWER VASCULAR RIGHT POSTERIOR TIBIAL COMPRESS: NORMAL
BH CV LOWER VASCULAR RIGHT PROXIMAL FEMORAL COMPRESS: NORMAL
BH CV LOWER VASCULAR RIGHT SAPHENOFEMORAL JUNCTION COMPRESS: NORMAL
BILIRUB SERPL-MCNC: 0.5 MG/DL (ref 0–1.2)
BUN SERPL-MCNC: 12 MG/DL (ref 8–23)
BUN/CREAT SERPL: 13.5 (ref 7–25)
CALCIUM SPEC-SCNC: 9.4 MG/DL (ref 8.6–10.5)
CHLORIDE SERPL-SCNC: 100 MMOL/L (ref 98–107)
CHOLEST SERPL-MCNC: 130 MG/DL (ref 0–200)
CK MB SERPL-CCNC: 2.73 NG/ML
CK SERPL-CCNC: 58 U/L (ref 20–200)
CO2 SERPL-SCNC: 26.3 MMOL/L (ref 22–29)
CREAT SERPL-MCNC: 0.89 MG/DL (ref 0.76–1.27)
D DIMER PPP FEU-MCNC: 0.81 MCGFEU/ML (ref 0–0.57)
DEPRECATED RDW RBC AUTO: 45.9 FL (ref 37–54)
EGFRCR SERPLBLD CKD-EPI 2021: 85.6 ML/MIN/1.73
EOSINOPHIL # BLD AUTO: 0.03 10*3/MM3 (ref 0–0.4)
EOSINOPHIL NFR BLD AUTO: 0.5 % (ref 0.3–6.2)
ERYTHROCYTE [DISTWIDTH] IN BLOOD BY AUTOMATED COUNT: 13.7 % (ref 12.3–15.4)
GLOBULIN UR ELPH-MCNC: 1.8 GM/DL
GLUCOSE BLDC GLUCOMTR-MCNC: 104 MG/DL (ref 70–99)
GLUCOSE BLDC GLUCOMTR-MCNC: 105 MG/DL (ref 70–99)
GLUCOSE SERPL-MCNC: 103 MG/DL (ref 65–99)
HBA1C MFR BLD: 6.1 % (ref 4.8–5.6)
HCT VFR BLD AUTO: 37.4 % (ref 37.5–51)
HDLC SERPL-MCNC: 50 MG/DL (ref 40–60)
HGB BLD-MCNC: 12.7 G/DL (ref 13–17.7)
HOLD SPECIMEN: NORMAL
IMM GRANULOCYTES # BLD AUTO: 0 10*3/MM3 (ref 0–0.05)
IMM GRANULOCYTES NFR BLD AUTO: 0 % (ref 0–0.5)
LDLC SERPL CALC-MCNC: 57 MG/DL (ref 0–100)
LDLC/HDLC SERPL: 1.08 {RATIO}
LIPASE SERPL-CCNC: 50 U/L (ref 13–60)
LYMPHOCYTES # BLD AUTO: 1.34 10*3/MM3 (ref 0.7–3.1)
LYMPHOCYTES NFR BLD AUTO: 21.9 % (ref 19.6–45.3)
MAGNESIUM SERPL-MCNC: 2.1 MG/DL (ref 1.6–2.4)
MAXIMAL PREDICTED HEART RATE: 138 BPM
MCH RBC QN AUTO: 31 PG (ref 26.6–33)
MCHC RBC AUTO-ENTMCNC: 34 G/DL (ref 31.5–35.7)
MCV RBC AUTO: 91.2 FL (ref 79–97)
MONOCYTES # BLD AUTO: 0.38 10*3/MM3 (ref 0.1–0.9)
MONOCYTES NFR BLD AUTO: 6.2 % (ref 5–12)
NEUTROPHILS NFR BLD AUTO: 4.36 10*3/MM3 (ref 1.7–7)
NEUTROPHILS NFR BLD AUTO: 71.1 % (ref 42.7–76)
NRBC BLD AUTO-RTO: 0 /100 WBC (ref 0–0.2)
NT-PROBNP SERPL-MCNC: 818.4 PG/ML (ref 0–1800)
PLATELET # BLD AUTO: 153 10*3/MM3 (ref 140–450)
PMV BLD AUTO: 10.5 FL (ref 6–12)
POTASSIUM SERPL-SCNC: 4 MMOL/L (ref 3.5–5.2)
PROT SERPL-MCNC: 6.2 G/DL (ref 6–8.5)
QT INTERVAL: 425 MS
QT INTERVAL: 433 MS
RBC # BLD AUTO: 4.1 10*6/MM3 (ref 4.14–5.8)
SODIUM SERPL-SCNC: 135 MMOL/L (ref 136–145)
STRESS TARGET HR: 117 BPM
TRIGL SERPL-MCNC: 130 MG/DL (ref 0–150)
TROPONIN I SERPL-MCNC: 0 NG/ML (ref 0–0.08)
TROPONIN I SERPL-MCNC: 0 NG/ML (ref 0–0.08)
TROPONIN T SERPL-MCNC: 0.17 NG/ML (ref 0–0.03)
TROPONIN T SERPL-MCNC: 0.19 NG/ML (ref 0–0.03)
VLDLC SERPL-MCNC: 23 MG/DL (ref 5–40)
WBC NRBC COR # BLD: 6.13 10*3/MM3 (ref 3.4–10.8)
WHOLE BLOOD HOLD COAG: NORMAL
WHOLE BLOOD HOLD SPECIMEN: NORMAL

## 2022-08-10 PROCEDURE — 25010000002 HEPARIN (PORCINE) 25000-0.45 UT/250ML-% SOLUTION: Performed by: EMERGENCY MEDICINE

## 2022-08-10 PROCEDURE — 85730 THROMBOPLASTIN TIME PARTIAL: CPT | Performed by: EMERGENCY MEDICINE

## 2022-08-10 PROCEDURE — 25010000002 MORPHINE PER 10 MG: Performed by: EMERGENCY MEDICINE

## 2022-08-10 PROCEDURE — 25010000002 MORPHINE PER 10 MG: Performed by: SPECIALIST

## 2022-08-10 PROCEDURE — 36415 COLL VENOUS BLD VENIPUNCTURE: CPT | Performed by: INTERNAL MEDICINE

## 2022-08-10 PROCEDURE — 99284 EMERGENCY DEPT VISIT MOD MDM: CPT

## 2022-08-10 PROCEDURE — 83036 HEMOGLOBIN GLYCOSYLATED A1C: CPT | Performed by: INTERNAL MEDICINE

## 2022-08-10 PROCEDURE — 82550 ASSAY OF CK (CPK): CPT | Performed by: EMERGENCY MEDICINE

## 2022-08-10 PROCEDURE — 93970 EXTREMITY STUDY: CPT

## 2022-08-10 PROCEDURE — 85730 THROMBOPLASTIN TIME PARTIAL: CPT | Performed by: INTERNAL MEDICINE

## 2022-08-10 PROCEDURE — 83880 ASSAY OF NATRIURETIC PEPTIDE: CPT | Performed by: EMERGENCY MEDICINE

## 2022-08-10 PROCEDURE — 85379 FIBRIN DEGRADATION QUANT: CPT | Performed by: INTERNAL MEDICINE

## 2022-08-10 PROCEDURE — 82553 CREATINE MB FRACTION: CPT | Performed by: EMERGENCY MEDICINE

## 2022-08-10 PROCEDURE — 93010 ELECTROCARDIOGRAM REPORT: CPT | Performed by: SPECIALIST

## 2022-08-10 PROCEDURE — 71045 X-RAY EXAM CHEST 1 VIEW: CPT

## 2022-08-10 PROCEDURE — G0378 HOSPITAL OBSERVATION PER HR: HCPCS

## 2022-08-10 PROCEDURE — 83690 ASSAY OF LIPASE: CPT | Performed by: EMERGENCY MEDICINE

## 2022-08-10 PROCEDURE — 84484 ASSAY OF TROPONIN QUANT: CPT

## 2022-08-10 PROCEDURE — 85025 COMPLETE CBC W/AUTO DIFF WBC: CPT | Performed by: EMERGENCY MEDICINE

## 2022-08-10 PROCEDURE — 99223 1ST HOSP IP/OBS HIGH 75: CPT | Performed by: INTERNAL MEDICINE

## 2022-08-10 PROCEDURE — 93970 EXTREMITY STUDY: CPT | Performed by: SURGERY

## 2022-08-10 PROCEDURE — 94799 UNLISTED PULMONARY SVC/PX: CPT

## 2022-08-10 PROCEDURE — 36415 COLL VENOUS BLD VENIPUNCTURE: CPT

## 2022-08-10 PROCEDURE — 80053 COMPREHEN METABOLIC PANEL: CPT | Performed by: EMERGENCY MEDICINE

## 2022-08-10 PROCEDURE — 93005 ELECTROCARDIOGRAM TRACING: CPT | Performed by: EMERGENCY MEDICINE

## 2022-08-10 PROCEDURE — 84484 ASSAY OF TROPONIN QUANT: CPT | Performed by: INTERNAL MEDICINE

## 2022-08-10 PROCEDURE — 83735 ASSAY OF MAGNESIUM: CPT | Performed by: EMERGENCY MEDICINE

## 2022-08-10 PROCEDURE — 25010000002 ONDANSETRON PER 1 MG: Performed by: EMERGENCY MEDICINE

## 2022-08-10 PROCEDURE — 85730 THROMBOPLASTIN TIME PARTIAL: CPT | Performed by: SPECIALIST

## 2022-08-10 PROCEDURE — 80061 LIPID PANEL: CPT | Performed by: INTERNAL MEDICINE

## 2022-08-10 PROCEDURE — 82962 GLUCOSE BLOOD TEST: CPT

## 2022-08-10 PROCEDURE — 99285 EMERGENCY DEPT VISIT HI MDM: CPT

## 2022-08-10 RX ORDER — SODIUM CHLORIDE 0.9 % (FLUSH) 0.9 %
10 SYRINGE (ML) INJECTION AS NEEDED
Status: DISCONTINUED | OUTPATIENT
Start: 2022-08-10 | End: 2022-08-12 | Stop reason: HOSPADM

## 2022-08-10 RX ORDER — ASPIRIN 81 MG/1
324 TABLET, CHEWABLE ORAL ONCE
Status: COMPLETED | OUTPATIENT
Start: 2022-08-10 | End: 2022-08-10

## 2022-08-10 RX ORDER — MORPHINE SULFATE 2 MG/ML
1 INJECTION, SOLUTION INTRAMUSCULAR; INTRAVENOUS EVERY 4 HOURS PRN
Status: DISCONTINUED | OUTPATIENT
Start: 2022-08-10 | End: 2022-08-12 | Stop reason: HOSPADM

## 2022-08-10 RX ORDER — CLOPIDOGREL BISULFATE 75 MG/1
75 TABLET ORAL DAILY
Status: DISCONTINUED | OUTPATIENT
Start: 2022-08-10 | End: 2022-08-12 | Stop reason: HOSPADM

## 2022-08-10 RX ORDER — ASPIRIN 81 MG/1
81 TABLET, CHEWABLE ORAL DAILY
Status: DISCONTINUED | OUTPATIENT
Start: 2022-08-11 | End: 2022-08-12 | Stop reason: HOSPADM

## 2022-08-10 RX ORDER — NITROGLYCERIN 0.4 MG/1
0.4 TABLET SUBLINGUAL
Status: DISCONTINUED | OUTPATIENT
Start: 2022-08-10 | End: 2022-08-12 | Stop reason: HOSPADM

## 2022-08-10 RX ORDER — ONDANSETRON 2 MG/ML
4 INJECTION INTRAMUSCULAR; INTRAVENOUS EVERY 6 HOURS PRN
Status: DISCONTINUED | OUTPATIENT
Start: 2022-08-10 | End: 2022-08-12 | Stop reason: HOSPADM

## 2022-08-10 RX ORDER — NICOTINE POLACRILEX 4 MG
15 LOZENGE BUCCAL
Status: DISCONTINUED | OUTPATIENT
Start: 2022-08-10 | End: 2022-08-12 | Stop reason: HOSPADM

## 2022-08-10 RX ORDER — ROSUVASTATIN CALCIUM 20 MG/1
1 TABLET, COATED ORAL
COMMUNITY
Start: 2022-07-11 | End: 2022-08-12 | Stop reason: HOSPADM

## 2022-08-10 RX ORDER — AMLODIPINE BESYLATE 2.5 MG/1
2.5 TABLET ORAL
Status: DISCONTINUED | OUTPATIENT
Start: 2022-08-10 | End: 2022-08-10

## 2022-08-10 RX ORDER — INSULIN LISPRO 100 [IU]/ML
0-7 INJECTION, SOLUTION INTRAVENOUS; SUBCUTANEOUS
Status: DISCONTINUED | OUTPATIENT
Start: 2022-08-10 | End: 2022-08-12 | Stop reason: HOSPADM

## 2022-08-10 RX ORDER — SODIUM CHLORIDE 0.9 % (FLUSH) 0.9 %
10 SYRINGE (ML) INJECTION EVERY 12 HOURS SCHEDULED
Status: DISCONTINUED | OUTPATIENT
Start: 2022-08-10 | End: 2022-08-12 | Stop reason: HOSPADM

## 2022-08-10 RX ORDER — SODIUM CHLORIDE 9 MG/ML
40 INJECTION, SOLUTION INTRAVENOUS AS NEEDED
Status: DISCONTINUED | OUTPATIENT
Start: 2022-08-10 | End: 2022-08-12 | Stop reason: HOSPADM

## 2022-08-10 RX ORDER — LISINOPRIL 10 MG/1
10 TABLET ORAL DAILY
Status: DISCONTINUED | OUTPATIENT
Start: 2022-08-11 | End: 2022-08-11

## 2022-08-10 RX ORDER — PROMETHAZINE HYDROCHLORIDE 12.5 MG/1
12.5 SUPPOSITORY RECTAL EVERY 6 HOURS PRN
Status: DISCONTINUED | OUTPATIENT
Start: 2022-08-10 | End: 2022-08-12 | Stop reason: HOSPADM

## 2022-08-10 RX ORDER — LEVOTHYROXINE SODIUM 88 UG/1
88 TABLET ORAL
Status: DISCONTINUED | OUTPATIENT
Start: 2022-08-11 | End: 2022-08-10

## 2022-08-10 RX ORDER — ATORVASTATIN CALCIUM 40 MG/1
80 TABLET, FILM COATED ORAL DAILY
Status: DISCONTINUED | OUTPATIENT
Start: 2022-08-10 | End: 2022-08-12 | Stop reason: HOSPADM

## 2022-08-10 RX ORDER — ONDANSETRON 2 MG/ML
4 INJECTION INTRAMUSCULAR; INTRAVENOUS ONCE
Status: COMPLETED | OUTPATIENT
Start: 2022-08-10 | End: 2022-08-10

## 2022-08-10 RX ORDER — LISINOPRIL 20 MG/1
20 TABLET ORAL DAILY
Status: DISCONTINUED | OUTPATIENT
Start: 2022-08-11 | End: 2022-08-10

## 2022-08-10 RX ORDER — DEXTROSE MONOHYDRATE 25 G/50ML
25 INJECTION, SOLUTION INTRAVENOUS
Status: DISCONTINUED | OUTPATIENT
Start: 2022-08-10 | End: 2022-08-12 | Stop reason: HOSPADM

## 2022-08-10 RX ORDER — HEPARIN SODIUM 10000 [USP'U]/100ML
12 INJECTION, SOLUTION INTRAVENOUS
Status: DISCONTINUED | OUTPATIENT
Start: 2022-08-10 | End: 2022-08-11

## 2022-08-10 RX ORDER — NALOXONE HCL 0.4 MG/ML
0.4 VIAL (ML) INJECTION
Status: DISCONTINUED | OUTPATIENT
Start: 2022-08-10 | End: 2022-08-12 | Stop reason: HOSPADM

## 2022-08-10 RX ORDER — PROMETHAZINE HYDROCHLORIDE 12.5 MG/1
12.5 TABLET ORAL EVERY 6 HOURS PRN
Status: DISCONTINUED | OUTPATIENT
Start: 2022-08-10 | End: 2022-08-12 | Stop reason: HOSPADM

## 2022-08-10 RX ORDER — MORPHINE SULFATE 2 MG/ML
1 INJECTION, SOLUTION INTRAMUSCULAR; INTRAVENOUS
Status: COMPLETED | OUTPATIENT
Start: 2022-08-10 | End: 2022-08-11

## 2022-08-10 RX ORDER — NITROGLYCERIN 0.4 MG/1
0.4 TABLET SUBLINGUAL
COMMUNITY

## 2022-08-10 RX ORDER — LEVOTHYROXINE SODIUM 0.07 MG/1
75 TABLET ORAL
Status: DISCONTINUED | OUTPATIENT
Start: 2022-08-11 | End: 2022-08-12 | Stop reason: HOSPADM

## 2022-08-10 RX ORDER — LORAZEPAM 2 MG/ML
0.5 INJECTION INTRAMUSCULAR EVERY 6 HOURS PRN
Status: DISCONTINUED | OUTPATIENT
Start: 2022-08-10 | End: 2022-08-12 | Stop reason: HOSPADM

## 2022-08-10 RX ORDER — AMLODIPINE BESYLATE 5 MG/1
5 TABLET ORAL
Status: DISCONTINUED | OUTPATIENT
Start: 2022-08-11 | End: 2022-08-11

## 2022-08-10 RX ORDER — ACETAMINOPHEN 325 MG/1
650 TABLET ORAL EVERY 4 HOURS PRN
Status: DISCONTINUED | OUTPATIENT
Start: 2022-08-10 | End: 2022-08-12 | Stop reason: HOSPADM

## 2022-08-10 RX ORDER — NITROGLYCERIN 20 MG/100ML
5-200 INJECTION INTRAVENOUS
Status: DISCONTINUED | OUTPATIENT
Start: 2022-08-10 | End: 2022-08-12 | Stop reason: HOSPADM

## 2022-08-10 RX ADMIN — MORPHINE SULFATE 4 MG: 4 INJECTION, SOLUTION INTRAMUSCULAR; INTRAVENOUS at 08:28

## 2022-08-10 RX ADMIN — HEPARIN SODIUM 12 UNITS/KG/HR: 10000 INJECTION, SOLUTION INTRAVENOUS at 08:30

## 2022-08-10 RX ADMIN — SODIUM CHLORIDE 500 ML: 9 INJECTION, SOLUTION INTRAVENOUS at 09:29

## 2022-08-10 RX ADMIN — MORPHINE SULFATE 1 MG: 2 INJECTION, SOLUTION INTRAMUSCULAR; INTRAVENOUS at 19:45

## 2022-08-10 RX ADMIN — NITROGLYCERIN 1 INCH: 20 OINTMENT TOPICAL at 07:21

## 2022-08-10 RX ADMIN — CLOPIDOGREL BISULFATE 75 MG: 75 TABLET ORAL at 11:04

## 2022-08-10 RX ADMIN — ASPIRIN 81 MG CHEWABLE TABLET 324 MG: 81 TABLET CHEWABLE at 05:51

## 2022-08-10 RX ADMIN — ONDANSETRON 4 MG: 2 INJECTION INTRAMUSCULAR; INTRAVENOUS at 09:01

## 2022-08-10 RX ADMIN — METOPROLOL TARTRATE 12.5 MG: 25 TABLET, FILM COATED ORAL at 11:04

## 2022-08-10 RX ADMIN — ATORVASTATIN CALCIUM 80 MG: 40 TABLET, FILM COATED ORAL at 11:04

## 2022-08-10 RX ADMIN — MORPHINE SULFATE 4 MG: 4 INJECTION INTRAVENOUS at 09:01

## 2022-08-10 RX ADMIN — NITROGLYCERIN 10 MCG/MIN: 20 INJECTION INTRAVENOUS at 07:59

## 2022-08-10 RX ADMIN — METOPROLOL TARTRATE 12.5 MG: 25 TABLET, FILM COATED ORAL at 22:05

## 2022-08-10 NOTE — H&P
Hospitalist H&P Note    Patient Identification:  Name: Andrea Odom  Age: 82 y.o.  MRN: [id number]                          Chief Complaint:  My BP was high and chest pain    History of Present Illness:  81 y/o male with a h/o HTN, DM2, hypothyroidism, recent COVID-`19 infection at end of July, CAD s/p CABG with prior stenting who presents with substernal chest pressure and high BP. He states he developed midsternal chest pressure earlier this morning. He took his BP and it was in the 180s on the top number. He had not taken his BP meds yet. He took a nitro and the chest pressure went away and then came right back so he called EMS to come to the ED. He states he has had PCI with stenting with Dr Jung in the relatively recent past but cannot remember exactly when this was. He endorses some sob. He had covid at the end of July and was hospitalized briefly for this. He denies any fevers or chills or cough. He was going to be discharged from the ED when his chest pain returned and he reportedly developed ST depression in his lateral leads so he was placed on a heparin and nitro gtt.     Review of Systems:  Constitutional:  No fever or chills, no weight loss  Eyes:  No blurry or double vision  HENT:  No nasal congestion or sore throat   Respiratory:  No cough, some shortness of breath   Cardiovascular:  +chest pain, no palpitations, some edema   GI:  No abdominal pain, nausea, vomiting, bloody stools or diarrhea   :  No dysuria or hematuria  Musculoskeletal:  No back pain or joint pain   Skin:  No rash, no lumps  Neurologic:  No headache, focal weakness or dizziness  Endocrine:  No polyuria or polydipsia   Lymphatic:  No swollen glands   Psychiatric:  No depression or anxiety     Past Medical History:   Diagnosis Date   • Diabetes mellitus (HCC)    • Hypertension        Past Surgical History:   Procedure Laterality Date   • CARDIAC SURGERY          History reviewed. No pertinent family history.     Social History  "    Tobacco Use   • Smoking status: Never Smoker   • Smokeless tobacco: Not on file   Substance Use Topics   • Alcohol use: Never        Allergies   Allergen Reactions   • Pantoprazole Unknown - Low Severity   • Penicillins Dizziness          • Sulfa Antibiotics Other (See Comments)     \"FEEL BAD\"       (Not in a hospital admission)      Objective:  Vitals:   Temp:  [97.9 °F (36.6 °C)] 97.9 °F (36.6 °C)  Heart Rate:  [] 78  Resp:  [20] 20  BP: (109-157)/(64-90) 109/70    Physical Exam:  Gen: NAD, conversant.   Eyes: conjunctiva clear, moist mucous membranes  HENT: Atraumatic, oropharynx clear with moist mucous membranes  Neck: No lymphadenopathy, no thyromegaly  Resp: normal respiratory effort, CTAB  CV: RRR, no MRGs, RLE with 1-2+ peripheral edema and LLE with 1+ edema  GI: Soft, nontender, nondistended, (+) BS.  Psych: Alert and Oriented x 3, Mood and affect appropriate to situation  Skin: warm and dry on palpation. No rash or ulcers on inspection.  M/S: bilateral UE and bilateral LE muscle mass and tone WNL for age  Neuro: normal motor function in all 4 ext, CN 2-12 intact    Data Review:  I have personally reviewed and interpreted all labs and imaging as well as all outside records    Labs:  Lab Results (last 24 hours)     Procedure Component Value Units Date/Time    POC Troponin I with Hold Tube [[id number]] Collected: 08/10/22 0758    Specimen: Blood Updated: 08/10/22 0941    Narrative:      The following orders were created for panel order POC Troponin I with Hold Tube.  Procedure                               Abnormality         Status                     ---------                               -----------         ------                     POC Troponin I[[id number]]                                                              HOLD Troponin-I Tube[[id number]]                             Final result                 Please view results for these tests on the individual orders.    HOLD Troponin-I Tube " [[id number]] Collected: 08/10/22 0758    Specimen: Blood Updated: 08/10/22 0941     Extra Tube Hold for add-ons.     Comment: Auto resulted.       POC Troponin I [[id number]]  (Normal) Collected: 08/10/22 0756    Specimen: Blood Updated: 08/10/22 0808     Troponin I 0.00 ng/mL      Comment: Serial Number: [id number]:  [id number]       aPTT [[id number]]  (Normal) Collected: 08/10/22 0529    Specimen: Blood Updated: 08/10/22 0755     PTT 27.1 seconds     Comprehensive Metabolic Panel [[id number]]  (Abnormal) Collected: 08/10/22 0618    Specimen: Blood Updated: 08/10/22 0647     Glucose 103 mg/dL      BUN 12 mg/dL      Creatinine 0.89 mg/dL      Sodium 135 mmol/L      Potassium 4.0 mmol/L      Chloride 100 mmol/L      CO2 26.3 mmol/L      Calcium 9.4 mg/dL      Total Protein 6.2 g/dL      Albumin 4.40 g/dL      ALT (SGPT) 17 U/L      AST (SGOT) 15 U/L      Alkaline Phosphatase 61 U/L      Total Bilirubin 0.5 mg/dL      Globulin 1.8 gm/dL      A/G Ratio 2.4 g/dL      BUN/Creatinine Ratio 13.5     Anion Gap 8.7 mmol/L      eGFR 85.6 mL/min/1.73      Comment: National Kidney Foundation and American Society of Nephrology (ASN) Task Force recommended calculation based on the Chronic Kidney Disease Epidemiology Collaboration (CKD-EPI) equation refit without adjustment for race.       Narrative:      GFR Normal >60  Chronic Kidney Disease <60  Kidney Failure <15      CK Total & CKMB [[id number]]  (Normal) Collected: 08/10/22 0618    Specimen: Blood Updated: 08/10/22 0647     CKMB 2.73 ng/mL      Creatine Kinase 58 U/L     Narrative:      CKMB results may be falsely decreased if patient taking Biotin.    POC Troponin I with Hold Tube [[id number]] Collected: 08/10/22 0529    Specimen: Blood Updated: 08/10/22 0647    Narrative:      The following orders were created for panel order POC Troponin I with Hold Tube.  Procedure                               Abnormality         Status                     ---------                                -----------         ------                     POC Troponin I[[id number]]                                                              HOLD Troponin-I Tube[[id number]]                             Final result                 Please view results for these tests on the individual orders.    HOLD Troponin-I Tube [[id number]] Collected: 08/10/22 0529    Specimen: Blood Updated: 08/10/22 0647     Extra Tube Hold for add-ons.     Comment: Auto resulted.       [place] Draw [[id number]] Collected: 08/10/22 0529    Specimen: Blood Updated: 08/10/22 0632    Narrative:      The following orders were created for panel order [place] Draw.  Procedure                               Abnormality         Status                     ---------                               -----------         ------                     Green Top (Gel)[[id number]]                                  Final result               Lavender Top[[id number]]                                     Final result               Gold Top - SST[[id number]]                                   Final result               Light Blue Top[[id number]]                                   Final result                 Please view results for these tests on the individual orders.    Lavender Top [[id number]] Collected: 08/10/22 0529    Specimen: Blood Updated: 08/10/22 0632     Extra Tube hold for add-on     Comment: Auto resulted       Green Top (Gel) [[id number]] Collected: 08/10/22 0529    Specimen: Blood Updated: 08/10/22 0632     Extra Tube Hold for add-ons.     Comment: Auto resulted.       Gold Top - SST [[id number]] Collected: 08/10/22 0529    Specimen: Blood Updated: 08/10/22 0632     Extra Tube Hold for add-ons.     Comment: Auto resulted.       Light Blue Top [[id number]] Collected: 08/10/22 0529    Specimen: Blood Updated: 08/10/22 0632     Extra Tube Hold for add-ons.     Comment: Auto resulted       Lipase [[id number]]  (Normal) Collected: 08/10/22 0529    Specimen: Blood Updated:  08/10/22 0559     Lipase 50 U/L     Magnesium [[id number]]  (Normal) Collected: 08/10/22 0529    Specimen: Blood Updated: 08/10/22 0559     Magnesium 2.1 mg/dL     BNP [[id number]]  (Normal) Collected: 08/10/22 0529    Specimen: Blood Updated: 08/10/22 0557     proBNP 818.4 pg/mL     Narrative:      Among patients with dyspnea, NT-proBNP is highly sensitive for the detection of acute congestive heart failure. In addition NT-proBNP of <300 pg/ml effectively rules out acute congestive heart failure with 99% negative predictive value.    Results may be falsely decreased if patient taking Biotin.      POC Troponin I [[id number]]  (Normal) Collected: 08/10/22 0530    Specimen: Blood Updated: 08/10/22 0543     Troponin I 0.00 ng/mL      Comment: Serial Number: [id number]:  [id number]       CBC & Differential [[id number]]  (Abnormal) Collected: 08/10/22 0529    Specimen: Blood Updated: 08/10/22 0539    Narrative:      The following orders were created for panel order CBC & Differential.  Procedure                               Abnormality         Status                     ---------                               -----------         ------                     CBC Auto Differential[[id number]]        Abnormal            Final result                 Please view results for these tests on the individual orders.    CBC Auto Differential [[id number]]  (Abnormal) Collected: 08/10/22 0529    Specimen: Blood Updated: 08/10/22 0539     WBC 6.13 10*3/mm3      RBC 4.10 10*6/mm3      Hemoglobin 12.7 g/dL      Hematocrit 37.4 %      MCV 91.2 fL      MCH 31.0 pg      MCHC 34.0 g/dL      RDW 13.7 %      RDW-SD 45.9 fl      MPV 10.5 fL      Platelets 153 10*3/mm3      Neutrophil % 71.1 %      Lymphocyte % 21.9 %      Monocyte % 6.2 %      Eosinophil % 0.5 %      Basophil % 0.3 %      Immature Grans % 0.0 %      Neutrophils, Absolute 4.36 10*3/mm3      Lymphocytes, Absolute 1.34 10*3/mm3      Monocytes, Absolute 0.38 10*3/mm3      Eosinophils,  Absolute 0.03 10*3/mm3      Basophils, Absolute 0.02 10*3/mm3      Immature Grans, Absolute 0.00 10*3/mm3      nRBC 0.0 /100 WBC           Imaging:  Imaging Results (Last 24 Hours)     Procedure Component Value Units Date/Time    XR Chest 1 View [[id number]] Collected: 08/10/22 0555     Updated: 08/10/22 0558    Narrative:      PROCEDURE: XR CHEST 1 VW     COMPARISON: 8/15/2020.     INDICATIONS: chest pain, today     FINDINGS:  A single AP (or PA) upright portable chest radiograph was performed.  No cardiac   enlargement is seen.  No acute infiltrate is appreciated.  No pleural effusion or pneumothorax is   identified.  There are postoperative changes of the left shoulder.  The patient has undergone   median sternotomy and CABG (coronary artery bypass graft) surgery.  It is suspected that the   patient has undergone transcatheter aortic valve replacement (TAVR).  External artifacts obscure   detail.  The thoracic aorta is atherosclerotic.  Chronic calcified granulomatous disease involves   the chest.  Generalized osteopenia is suggested.  There are degenerative changes of the bilateral   shoulders and the imaged spine.  No significant interval change is seen since the prior study.       Impression:        No acute infiltrate is appreciated.  No cardiac enlargement.           COMMENT:  Part of this note is an electronic transcription of spoken language to printed text. The   electronic translation/transcription may permit erroneous, or at times, nonsensical (or even   sensical) words or phrases to be inadvertently transcribed or omitted; this  has   reviewed the note for such errors (as well as additional errors); however, some may still exist.     FÁTIMA HERNANDEZ JR, MD         Electronically Signed and Approved By: FÁTIMA HERNANDEZ JR, MD on 8/10/2022 at 5:55                              Assessment:    Acute chest pain      Plan:  Chest pain with a h/o CAD s/p CABG and prior stenting   - concerning for  unstable angina  - trop neg x2, recheck a third in a couple of hours  - resume his home asa, plavix. Will start high intensity statin and BBlocker  - cont heparin and nitro gtts -- will try and wean nitro gtt off as able  - repeat echo as he has not had one in 2 years  - check d-dimer given his recent covid infection -- CTA and venous duplex if markedly elevated  - check lipid profile  - Cardiology to consult -- defer ischemic workup to them    Recent covid-19 infection  - positive on 7/23/22  - CXR is clear  - do not think this is playing a role in current symptoms and dod not believe he is still infectious  - will r/o VTE given propensity to clot with covid -- as above    DM2  - hold home amaryl and metformin  - SSI  - check A1c    HTN  - resume home amlodipine, lisinopril  - metoprolol added for above    Hypothyroidism  - resume synthroid      Madhu Le MD  [facility]  8/10/2022  10:28 EDT

## 2022-08-10 NOTE — NURSING NOTE
Pt reported pain to RN as 2/10.  During rounding 10 minutes later, MD reported patient stating pain now 5/10.  Orders received for pain medication, restart nitro drip, give midodrine for low BP.  PTT resulted, heparin drip restarted, nitro drip restarted at 5mcg/per.  5 min after morphine, pt reported pain at 2/10.  10 min after 1/10.  15 minutes patient reports chest pain is still present but is less than 1/10.  Pt alert, oriented with no complaints at this time.  CTM

## 2022-08-10 NOTE — PLAN OF CARE
Goal Outcome Evaluation:   Patient alert and oriented, on room air, no complaints of chest pain, nitroglycerin drip and heparin drip continued, no other changes, continue to monitor.

## 2022-08-10 NOTE — ED PROVIDER NOTES
"Time: 6:31 AM EDT  Arrived by: ambulance  Chief Complaint: Chest pain  History provided by: Patient  History is limited by: N/A     History of Present Illness:  Patient is a 82 y.o. year old male who presents to the emergency department with high BP and chest pain.Pt has medical h/o HTN, heart stent placement and DM. Pt states taking his BP at home multiple times, with highest reading showing 170/80-88. Pt reports associated central chest pain, described as sharp and achy, states taking 3 nitroglycerin with complete resolution of symptoms at present. Pt states pain worsened on movement, also reports undocumented fever, stating feeling 'like he was on fire'. Pt denies nausea or emesis. Pt reports similar symptoms happen every time his BP rises. His Cardiologist is Hafsa. Pt denies recent stress test or cath done. Pt states recently got COVID, but denies recent nasal congestion, rhinorrhea, sore throat, cough and SOB. Pt notes normal BM, denies diarrhea, hematochezia and melena.       History provided by:  Patient   used: No        Similar Symptoms Previously: Yes  Recently seen: No      Patient Care Team  Primary Care Provider: Mary Moya APRN    Past Medical History:     Allergies   Allergen Reactions   • Latex, Natural Rubber Rash   • Pantoprazole Unknown - Low Severity   • Penicillins Dizziness          • Sulfa Antibiotics Other (See Comments)     \"FEEL BAD\"     Past Medical History:   Diagnosis Date   • Diabetes mellitus (HCC)    • GERD (gastroesophageal reflux disease)    • Hypertension      Past Surgical History:   Procedure Laterality Date   • CARDIAC CATHETERIZATION      stent placed   • CARDIAC SURGERY       History reviewed. No pertinent family history.    Home Medications:  Prior to Admission medications    Medication Sig Start Date End Date Taking? Authorizing Provider   amLODIPine (NORVASC) 2.5 MG tablet Take 2.5 mg by mouth.    Provider, MD Zurdo   aspirin 81 MG " chewable tablet aspirin 81 mg oral tablet,chewable chew 1 tablet (81 mg) by oral route once daily   Active    Zurdo Holloway MD   cetirizine (zyrTEC) 10 MG tablet Take 10 mg by mouth Daily. 1/20/22 1/21/23  Zurdo Holloway MD   cyanocobalamin (VITAMIN B-12) 500 MCG tablet Take 500 mcg by mouth Daily.    Zurdo Holloway MD   glimepiride (AMARYL) 2 MG tablet Take 2 mg by mouth Daily. 5/27/22   Zurdo Holloway MD   levothyroxine (SYNTHROID, LEVOTHROID) 88 MCG tablet Take 1 tablet by mouth Daily. 4/25/22   Zurdo Holloway MD   lisinopril (PRINIVIL,ZESTRIL) 20 MG tablet Take 20 mg by mouth Daily.    Zurdo Holloway MD   metFORMIN (GLUCOPHAGE) 500 MG tablet Take 500 mg by mouth. 3/14/22 3/15/23  Zurdo Holloway MD        Social History:   Social History     Tobacco Use   • Smoking status: Never Smoker   Vaping Use   • Vaping Use: Never used   Substance Use Topics   • Alcohol use: Never   • Drug use: Never         Review of Systems:  Review of Systems   Constitutional: Positive for fever (subjective). Negative for activity change, chills, fatigue and unexpected weight change.   HENT: Negative for congestion, rhinorrhea, sinus pressure, sore throat and trouble swallowing.    Eyes: Negative for pain, discharge, redness and visual disturbance.   Respiratory: Negative for cough, chest tightness, shortness of breath and wheezing.    Cardiovascular: Positive for chest pain. Negative for palpitations.   Gastrointestinal: Negative for abdominal pain, diarrhea, nausea and vomiting.   Endocrine: Negative for cold intolerance and polydipsia.   Genitourinary: Negative for dysuria, frequency, hematuria and urgency.   Musculoskeletal: Negative for arthralgias, joint swelling, neck pain and neck stiffness.   Skin: Negative for color change and rash.   Allergic/Immunologic: Negative for environmental allergies and immunocompromised state.   Neurological: Negative for dizziness, weakness and  "light-headedness.   Hematological: Does not bruise/bleed easily.   Psychiatric/Behavioral: Negative for agitation, confusion, dysphoric mood and suicidal ideas.        Physical Exam:  BP 95/56 (BP Location: Left arm, Patient Position: Lying)   Pulse 58   Temp 97.8 °F (36.6 °C) (Oral)   Resp 16   Ht 172.7 cm (68\")   Wt 78.1 kg (172 lb 2.9 oz)   SpO2 94%   BMI 26.18 kg/m²     Physical Exam  Vitals and nursing note reviewed.   Constitutional:       General: He is not in acute distress.     Appearance: Normal appearance. He is not toxic-appearing.   HENT:      Head: Normocephalic and atraumatic.      Jaw: There is normal jaw occlusion.   Eyes:      General: Lids are normal.      Extraocular Movements: Extraocular movements intact.      Conjunctiva/sclera: Conjunctivae normal.      Pupils: Pupils are equal, round, and reactive to light.   Cardiovascular:      Rate and Rhythm: Normal rate and regular rhythm.      Pulses: Normal pulses.      Heart sounds: Normal heart sounds.   Pulmonary:      Effort: Pulmonary effort is normal. No respiratory distress.      Breath sounds: Normal breath sounds. No wheezing or rhonchi.   Abdominal:      General: Abdomen is flat.      Palpations: Abdomen is soft.      Tenderness: There is no abdominal tenderness. There is no guarding or rebound.   Musculoskeletal:         General: Normal range of motion.      Cervical back: Normal range of motion and neck supple.      Right lower leg: No edema.      Left lower leg: No edema.   Skin:     General: Skin is warm and dry.   Neurological:      Mental Status: He is alert and oriented to person, place, and time. Mental status is at baseline.   Psychiatric:         Mood and Affect: Mood normal.                Medications in the Emergency Department:  Medications   sodium chloride 0.9 % flush 10 mL (has no administration in time range)   heparin bolus from bag 4,700 Units (0 Units Intravenous Hold 8/10/22 0939)   heparin 70336 units/250 mL (100 " units/mL) in 0.45 % NaCl infusion (12 Units/kg/hr × 78.1 kg Intravenous New Bag 8/11/22 0904)   heparin bolus from bag 3,900 Units (3,900 Units Intravenous Bolus from Bag 8/11/22 0349)   heparin bolus from bag 2,000 Units (has no administration in time range)   nitroglycerin (TRIDIL) 200 mcg/ml infusion (15 mcg/min Intravenous Rate/Dose Verify 8/11/22 0550)   aspirin chewable tablet 81 mg (81 mg Oral Given 8/11/22 0905)   nitroglycerin (NITROSTAT) SL tablet 0.4 mg (has no administration in time range)   sodium chloride 0.9 % flush 10 mL (has no administration in time range)   sodium chloride 0.9 % flush 10 mL ( Intravenous Canceled Entry 8/11/22 1146)   sodium chloride 0.9 % infusion 40 mL (has no administration in time range)   acetaminophen (TYLENOL) tablet 650 mg (has no administration in time range)   ondansetron (ZOFRAN) injection 4 mg (4 mg Intravenous Given 8/11/22 0352)   dextrose (GLUTOSE) oral gel 15 g (has no administration in time range)   dextrose (D50W) (25 g/50 mL) IV injection 25 g (has no administration in time range)   glucagon (human recombinant) (GLUCAGEN DIAGNOSTIC) injection 1 mg (has no administration in time range)   Insulin Lispro (humaLOG) injection 0-7 Units (0 Units Subcutaneous Not Given 8/11/22 1214)   atorvastatin (LIPITOR) tablet 80 mg (80 mg Oral Given 8/11/22 0905)   clopidogrel (PLAVIX) tablet 75 mg (75 mg Oral Given 8/11/22 0905)   levothyroxine (SYNTHROID, LEVOTHROID) tablet 75 mcg (75 mcg Oral Not Given 8/11/22 0642)   sodium chloride 0.9 % flush 10 mL (has no administration in time range)   sodium chloride 0.9 % flush 10 mL (10 mL Intravenous Given 8/11/22 0905)   sodium chloride 0.9 % infusion 40 mL (has no administration in time range)   LORazepam (ATIVAN) injection 0.5 mg (0.5 mg Intravenous Given 8/11/22 0114)   promethazine (PHENERGAN) tablet 12.5 mg (has no administration in time range)     Or   promethazine (PHENERGAN) suppository 12.5 mg (has no administration in time  range)   morphine injection 1 mg (1 mg Intravenous Given 8/11/22 0043)     And   naloxone (NARCAN) injection 0.4 mg (has no administration in time range)   morphine injection 1 mg (has no administration in time range)   metoprolol tartrate (LOPRESSOR) tablet 25 mg (25 mg Oral Given 8/11/22 0905)   sodium chloride 0.9 % bolus 250 mL (has no administration in time range)   pantoprazole (PROTONIX) EC tablet 40 mg (40 mg Oral Not Given 8/11/22 1214)   aspirin chewable tablet 324 mg (324 mg Oral Given 8/10/22 0551)   nitroglycerin (NITROSTAT) ointment 1 inch (1 inch Topical Given 8/10/22 0721)   morphine injection 4 mg (4 mg Intravenous Given 8/10/22 0828)   sodium chloride 0.9 % bolus 500 mL (0 mL Intravenous Stopped 8/10/22 1108)   ondansetron (ZOFRAN) injection 4 mg (4 mg Intravenous Given 8/10/22 0901)   morphine injection 4 mg (4 mg Intravenous Given 8/10/22 0901)   morphine injection 1 mg (1 mg Intravenous Given 8/11/22 0036)        Labs  Lab Results (last 24 hours)     Procedure Component Value Units Date/Time    Troponin [[id number]]  (Abnormal) Collected: 08/10/22 1542    Specimen: Blood Updated: 08/10/22 1648     Troponin T 0.171 ng/mL     Narrative:      Troponin T Reference Range:  <= 0.03 ng/mL-   Negative for AMI  >0.03 ng/mL-     Abnormal for myocardial necrosis.  Clinicians would have to utilize clinical acumen, EKG, Troponin and serial changes to determine if it is an Acute Myocardial Infarction or myocardial injury due to an underlying chronic condition.       Results may be falsely decreased if patient taking Biotin.      aPTT [[id number]]  (Abnormal) Collected: 08/10/22 1542    Specimen: Blood Updated: 08/10/22 1705     PTT >200.0 seconds     POC Glucose Once [[id number]]  (Abnormal) Collected: 08/10/22 1643    Specimen: Blood Updated: 08/10/22 1644     Glucose 105 mg/dL      Comment: Serial Number: [id number]:  [id number]       aPTT [[id number]]  (Abnormal) Collected: 08/10/22 1828    Specimen:  Blood Updated: 08/10/22 1912     PTT 42.1 seconds     Troponin [[id number]]  (Abnormal) Collected: 08/10/22 1828    Specimen: Blood Updated: 08/10/22 1953     Troponin T 0.193 ng/mL     Narrative:      Troponin T Reference Range:  <= 0.03 ng/mL-   Negative for AMI  >0.03 ng/mL-     Abnormal for myocardial necrosis.  Clinicians would have to utilize clinical acumen, EKG, Troponin and serial changes to determine if it is an Acute Myocardial Infarction or myocardial injury due to an underlying chronic condition.       Results may be falsely decreased if patient taking Biotin.      aPTT [[id number]]  (Abnormal) Collected: 08/10/22 2216    Specimen: Blood Updated: 08/10/22 2238     PTT 38.6 seconds     POC Glucose Once [[id number]]  (Abnormal) Collected: 08/11/22 0027    Specimen: Blood Updated: 08/11/22 0028     Glucose 113 mg/dL      Comment: Serial Number: [id number]:  [id number]       Troponin [[id number]]  (Abnormal) Collected: 08/11/22 0126    Specimen: Blood Updated: 08/11/22 0209     Troponin T 0.126 ng/mL     Narrative:      Troponin T Reference Range:  <= 0.03 ng/mL-   Negative for AMI  >0.03 ng/mL-     Abnormal for myocardial necrosis.  Clinicians would have to utilize clinical acumen, EKG, Troponin and serial changes to determine if it is an Acute Myocardial Infarction or myocardial injury due to an underlying chronic condition.       Results may be falsely decreased if patient taking Biotin.      aPTT [[id number]]  (Abnormal) Collected: 08/11/22 0126    Specimen: Blood Updated: 08/11/22 0150     PTT 44.1 seconds     POC Glucose Once [[id number]]  (Abnormal) Collected: 08/11/22 0821    Specimen: Blood Updated: 08/11/22 0822     Glucose 104 mg/dL      Comment: Serial Number: [id number]:  [id number]       COVID-19,APTIMA PANTHER(MUMTAZ),[facility], NP/OP SWAB IN UTM/VTM/[facility] TRANSPORT MEDIA,24 HR TAT - Swab, Nasopharynx [[id number]] Collected: 08/11/22 1104    Specimen: Swab from Nasopharynx Updated:  08/11/22 1104    POC Glucose Once [[id number]]  (Abnormal) Collected: 08/11/22 1212    Specimen: Blood Updated: 08/11/22 1216     Glucose 143 mg/dL      Comment: Serial Number: [id number]:  [id number]       CBC & Differential [[id number]] Collected: 08/11/22 1439    Specimen: Blood Updated: 08/11/22 1445    Narrative:      The following orders were created for panel order CBC & Differential.  Procedure                               Abnormality         Status                     ---------                               -----------         ------                     CBC Auto Differential[[id number]]                            In process                   Please view results for these tests on the individual orders.    CBC Auto Differential [[id number]] Collected: 08/11/22 1439    Specimen: Blood Updated: 08/11/22 1445    aPTT [[id number]] Collected: 08/11/22 1439    Specimen: Blood Updated: 08/11/22 1445    Comprehensive Metabolic Panel [[id number]] Collected: 08/11/22 1439    Specimen: Blood Updated: 08/11/22 1445    Protime-INR [[id number]] Collected: 08/11/22 1439    Specimen: Blood Updated: 08/11/22 1445           Imaging:  No Radiology Exams Resulted Within Past 24 Hours    Procedures:  ECG 12 Lead      Date/Time: 8/10/2022 7:01 AM  Performed by: Dillon Umana DO  Authorized by: Dillon Umana DO   Interpreted by physician  Comparison: compared with previous ECG from 8/15/2020  Similar to previous ECG  Comparison to previous ECG: No significant changes  Rhythm: sinus rhythm  BPM: 74  QRS axis: normal (NL QRS)  Other findings comments: NL P wave and RI interval, Non-specific ST changes, NL QT and QTc            Progress    The patient had reversible significant EKG changes associated with significant chest discomfort while he was in the emergency department.  Because of these changes he was started on nitroglycerin IV along with IV heparin.  The patient symptoms improved after this along with IV morphine  prior patient is currently pain-free however he will be admitted for further evaluation and treatment.           HEART Score: 6                  Medical Decision Making:  MDM  Number of Diagnoses or Management Options     Amount and/or Complexity of Data Reviewed  Clinical lab tests: reviewed  Tests in the radiology section of CPT®: reviewed  Tests in the medicine section of CPT®: reviewed  Decide to obtain previous medical records or to obtain history from someone other than the patient: yes  Obtain history from someone other than the patient: yes  Review and summarize past medical records: yes  Discuss the patient with other providers: yes  Independent visualization of images, tracings, or specimens: yes    Critical Care  Total time providing critical care: 30-74 minutes (50 minutes including direct patient care, review of medical records, review of ancillary studies, interpretation of ancillary studies, discussion with patient and admitting physician as well as cardiology.)    Patient Progress  Patient progress: stable (07:20 EDT Updated patient on results and plan for admission. Patient expressed understanding and agreement. All questions answered at this time.     07:39 EDT Pt reports chest pain has worsened. Updated patient on plan of Heparinization, morphine and nitroglycerin IV. Patient expressed understanding and agreement. All questions answered at this time. )       Final diagnoses:   Acute chest pain   Unstable angina (HCC)        Disposition:  ED Disposition     ED Disposition   Decision to Admit    Condition   --    Comment   Level of Care: Telemetry [5]   Diagnosis: Acute chest pain [858027]   Admitting Physician: LEATHA RIBEIRO [[id number]]   Attending Physician: LEATHA RIBEIRO [[id number]]               This medical record created using voice recognition software.    Documentation assistance provided by Marjorie Torres acting as scribe for Dillon Umana DO. Information recorded by the  scribe was done at my direction and has been verified and validated by me.          Marjorie Torres  08/10/22 0733       Majrorie Torres  08/10/22 0742       Dillon Umana,   08/11/22 1341

## 2022-08-10 NOTE — CONSULTS
Date of consultation: 8/10/2022.    The patient seen and examined in the ER this morning.    Reason for consultation: Chest pain    HPI: An 82-year-old male presented with retrosternal left-sided chest pain.  It was described as pressure-sharp or achy pain.  He took 1 sublingual nitroglycerin at a time for a total of 3 tablets prior to his arrival to the ER.  It partially relieved the pain.  He stated that when his blood pressure is elevated the chest pain was worse.  His systolic blood pressure was as high as 170 mmHg.  He has no dyspnea, diaphoresis, orthopnea, PND, palpitation, syncope or near syncope.  He had no recent pneumonia or bronchitis.    Review of systems: Significant for headache.  No nausea, vomiting, abdominal pain, dysuria, hematuria, hemoptysis, TIA or CVA-like symptoms.    Past medical and surgical history:    1.  Severe aortic stenosis status post TAVR in 2020  2.  CABG X 2 in 1989  3.  Cardiac catheterization 2020 that showed mild to moderate CAD  4.  Carotid endartrectomy  5.   DM type II  6.  Hypertensive heart disease with mild LVH  7.  Rotator cuff repair    Medications: See the patient's medication list    Allergies: Penicillins, sulfa and pantoprazole    Social history: Never smoked, drank alcohol or used illicit drugs.    Family history: Noncontributory    Physical examination: He was in no pain respiratory distress.  He has been on IV nitroglycerin drip.  Vital signs: Reviewed  HEENT: Sclerae nonicteric.  EOMI.  Neck: No JVD or carotid bruit.  Chest: CTA.  No wheeze or rales  Cardiac: RRR.  2/6 systolic murmur over the left sternal border  Abdomen: Soft and nontender.  No megalies or masses bowel sounds present no abdominal bruit  Extremities: No edema, cyanosis or clubbing.  Pulse intact.  Neuro: Alert, oriented x3 no facial droop speech was clear    Records: Reviewed    Assessment and plan:    1.  Unstable angina, rule out acute MI  2.  Coronary artery disease  3.  Severe aortic  stenosis status post TAVR  4.  Hypertensive heart disease  5.  Hypercholesterolemia  6.  Acute current syndrome orders.  7.  Intermittent hypotension.  Hold IV nitroglycerin drip for now  8.  Continue home medications including, aspirin, Plavix.  9.  Echocardiography in the morning  10. Cardiac catheterization later on.  11.  I signed off the case to Dr. Lazcano who will take care of his cardiac care

## 2022-08-11 ENCOUNTER — APPOINTMENT (OUTPATIENT)
Dept: CARDIOLOGY | Facility: HOSPITAL | Age: 82
End: 2022-08-11

## 2022-08-11 VITALS
WEIGHT: 172.18 LBS | SYSTOLIC BLOOD PRESSURE: 90 MMHG | TEMPERATURE: 97.2 F | RESPIRATION RATE: 20 BRPM | HEART RATE: 64 BPM | OXYGEN SATURATION: 100 % | HEIGHT: 68 IN | BODY MASS INDEX: 26.1 KG/M2 | DIASTOLIC BLOOD PRESSURE: 59 MMHG

## 2022-08-11 PROBLEM — I21.4 NSTEMI (NON-ST ELEVATED MYOCARDIAL INFARCTION) (HCC): Status: ACTIVE | Noted: 2022-08-11

## 2022-08-11 LAB
ACT BLD: 98 SECONDS (ref 89–137)
ALBUMIN SERPL-MCNC: 4.7 G/DL (ref 3.5–5.2)
ALBUMIN/GLOB SERPL: 1.8 G/DL
ALP SERPL-CCNC: 72 U/L (ref 39–117)
ALT SERPL W P-5'-P-CCNC: 16 U/L (ref 1–41)
ANION GAP SERPL CALCULATED.3IONS-SCNC: 8.4 MMOL/L (ref 5–15)
APTT PPP: 25.4 SECONDS (ref 24.2–34.2)
APTT PPP: 44.1 SECONDS (ref 24.2–34.2)
AST SERPL-CCNC: 22 U/L (ref 1–40)
BASOPHILS # BLD AUTO: 0.03 10*3/MM3 (ref 0–0.2)
BASOPHILS NFR BLD AUTO: 0.5 % (ref 0–1.5)
BILIRUB SERPL-MCNC: 0.6 MG/DL (ref 0–1.2)
BUN SERPL-MCNC: 13 MG/DL (ref 8–23)
BUN/CREAT SERPL: 12.5 (ref 7–25)
CALCIUM SPEC-SCNC: 10.2 MG/DL (ref 8.6–10.5)
CHLORIDE SERPL-SCNC: 101 MMOL/L (ref 98–107)
CK MB SERPL-CCNC: 7.28 NG/ML
CK SERPL-CCNC: 100 U/L (ref 20–200)
CO2 SERPL-SCNC: 27.6 MMOL/L (ref 22–29)
CREAT SERPL-MCNC: 1.04 MG/DL (ref 0.76–1.27)
DEPRECATED RDW RBC AUTO: 46.5 FL (ref 37–54)
EGFRCR SERPLBLD CKD-EPI 2021: 71.7 ML/MIN/1.73
EOSINOPHIL # BLD AUTO: 0.03 10*3/MM3 (ref 0–0.4)
EOSINOPHIL NFR BLD AUTO: 0.5 % (ref 0.3–6.2)
ERYTHROCYTE [DISTWIDTH] IN BLOOD BY AUTOMATED COUNT: 13.9 % (ref 12.3–15.4)
GLOBULIN UR ELPH-MCNC: 2.6 GM/DL
GLUCOSE BLDC GLUCOMTR-MCNC: 104 MG/DL (ref 70–99)
GLUCOSE BLDC GLUCOMTR-MCNC: 110 MG/DL (ref 70–99)
GLUCOSE BLDC GLUCOMTR-MCNC: 113 MG/DL (ref 70–99)
GLUCOSE BLDC GLUCOMTR-MCNC: 143 MG/DL (ref 70–99)
GLUCOSE BLDC GLUCOMTR-MCNC: 81 MG/DL (ref 70–99)
GLUCOSE SERPL-MCNC: 111 MG/DL (ref 65–99)
HCT VFR BLD AUTO: 40.1 % (ref 37.5–51)
HGB BLD-MCNC: 13.6 G/DL (ref 13–17.7)
IMM GRANULOCYTES # BLD AUTO: 0.01 10*3/MM3 (ref 0–0.05)
IMM GRANULOCYTES NFR BLD AUTO: 0.2 % (ref 0–0.5)
INR PPP: 0.95 (ref 0.86–1.15)
LYMPHOCYTES # BLD AUTO: 0.92 10*3/MM3 (ref 0.7–3.1)
LYMPHOCYTES NFR BLD AUTO: 16.8 % (ref 19.6–45.3)
MCH RBC QN AUTO: 31.1 PG (ref 26.6–33)
MCHC RBC AUTO-ENTMCNC: 33.9 G/DL (ref 31.5–35.7)
MCV RBC AUTO: 91.8 FL (ref 79–97)
MONOCYTES # BLD AUTO: 0.33 10*3/MM3 (ref 0.1–0.9)
MONOCYTES NFR BLD AUTO: 6 % (ref 5–12)
NEUTROPHILS NFR BLD AUTO: 4.17 10*3/MM3 (ref 1.7–7)
NEUTROPHILS NFR BLD AUTO: 76 % (ref 42.7–76)
NRBC BLD AUTO-RTO: 0 /100 WBC (ref 0–0.2)
PLATELET # BLD AUTO: 170 10*3/MM3 (ref 140–450)
PMV BLD AUTO: 10.4 FL (ref 6–12)
POTASSIUM SERPL-SCNC: 4.7 MMOL/L (ref 3.5–5.2)
PROT SERPL-MCNC: 7.3 G/DL (ref 6–8.5)
PROTHROMBIN TIME: 12.8 SECONDS (ref 11.8–14.9)
QT INTERVAL: 346 MS
RBC # BLD AUTO: 4.37 10*6/MM3 (ref 4.14–5.8)
SARS-COV-2 RNA PNL SPEC NAA+PROBE: DETECTED
SODIUM SERPL-SCNC: 137 MMOL/L (ref 136–145)
TROPONIN T SERPL-MCNC: 0.13 NG/ML (ref 0–0.03)
WBC NRBC COR # BLD: 5.49 10*3/MM3 (ref 3.4–10.8)

## 2022-08-11 PROCEDURE — B2181ZZ FLUOROSCOPY OF LEFT INTERNAL MAMMARY BYPASS GRAFT USING LOW OSMOLAR CONTRAST: ICD-10-PCS | Performed by: INTERNAL MEDICINE

## 2022-08-11 PROCEDURE — 82553 CREATINE MB FRACTION: CPT | Performed by: INTERNAL MEDICINE

## 2022-08-11 PROCEDURE — 25010000002 FENTANYL CITRATE (PF) 50 MCG/ML SOLUTION: Performed by: INTERNAL MEDICINE

## 2022-08-11 PROCEDURE — 25010000002 LORAZEPAM PER 2 MG: Performed by: SPECIALIST

## 2022-08-11 PROCEDURE — C1894 INTRO/SHEATH, NON-LASER: HCPCS | Performed by: INTERNAL MEDICINE

## 2022-08-11 PROCEDURE — 93010 ELECTROCARDIOGRAM REPORT: CPT | Performed by: INTERNAL MEDICINE

## 2022-08-11 PROCEDURE — U0005 INFEC AGEN DETEC AMPLI PROBE: HCPCS | Performed by: INTERNAL MEDICINE

## 2022-08-11 PROCEDURE — B2121ZZ FLUOROSCOPY OF SINGLE CORONARY ARTERY BYPASS GRAFT USING LOW OSMOLAR CONTRAST: ICD-10-PCS | Performed by: INTERNAL MEDICINE

## 2022-08-11 PROCEDURE — 82550 ASSAY OF CK (CPK): CPT | Performed by: INTERNAL MEDICINE

## 2022-08-11 PROCEDURE — 0 IOPAMIDOL PER 1 ML: Performed by: INTERNAL MEDICINE

## 2022-08-11 PROCEDURE — 85730 THROMBOPLASTIN TIME PARTIAL: CPT | Performed by: INTERNAL MEDICINE

## 2022-08-11 PROCEDURE — 85025 COMPLETE CBC W/AUTO DIFF WBC: CPT | Performed by: INTERNAL MEDICINE

## 2022-08-11 PROCEDURE — 85347 COAGULATION TIME ACTIVATED: CPT

## 2022-08-11 PROCEDURE — 99152 MOD SED SAME PHYS/QHP 5/>YRS: CPT | Performed by: INTERNAL MEDICINE

## 2022-08-11 PROCEDURE — U0004 COV-19 TEST NON-CDC HGH THRU: HCPCS | Performed by: INTERNAL MEDICINE

## 2022-08-11 PROCEDURE — B2111ZZ FLUOROSCOPY OF MULTIPLE CORONARY ARTERIES USING LOW OSMOLAR CONTRAST: ICD-10-PCS | Performed by: INTERNAL MEDICINE

## 2022-08-11 PROCEDURE — 99153 MOD SED SAME PHYS/QHP EA: CPT | Performed by: INTERNAL MEDICINE

## 2022-08-11 PROCEDURE — 25010000002 MORPHINE PER 10 MG: Performed by: SPECIALIST

## 2022-08-11 PROCEDURE — 85610 PROTHROMBIN TIME: CPT | Performed by: INTERNAL MEDICINE

## 2022-08-11 PROCEDURE — 25010000002 MIDAZOLAM PER 1 MG: Performed by: INTERNAL MEDICINE

## 2022-08-11 PROCEDURE — 84484 ASSAY OF TROPONIN QUANT: CPT | Performed by: INTERNAL MEDICINE

## 2022-08-11 PROCEDURE — 99239 HOSP IP/OBS DSCHRG MGMT >30: CPT | Performed by: INTERNAL MEDICINE

## 2022-08-11 PROCEDURE — 94761 N-INVAS EAR/PLS OXIMETRY MLT: CPT

## 2022-08-11 PROCEDURE — 93306 TTE W/DOPPLER COMPLETE: CPT

## 2022-08-11 PROCEDURE — 80053 COMPREHEN METABOLIC PANEL: CPT | Performed by: INTERNAL MEDICINE

## 2022-08-11 PROCEDURE — 85730 THROMBOPLASTIN TIME PARTIAL: CPT | Performed by: SPECIALIST

## 2022-08-11 PROCEDURE — 99231 SBSQ HOSP IP/OBS SF/LOW 25: CPT | Performed by: INTERNAL MEDICINE

## 2022-08-11 PROCEDURE — 25010000002 LORAZEPAM PER 2 MG: Performed by: INTERNAL MEDICINE

## 2022-08-11 PROCEDURE — 25010000002 ONDANSETRON PER 1 MG: Performed by: INTERNAL MEDICINE

## 2022-08-11 PROCEDURE — C1769 GUIDE WIRE: HCPCS | Performed by: INTERNAL MEDICINE

## 2022-08-11 PROCEDURE — 93459 L HRT ART/GRFT ANGIO: CPT | Performed by: INTERNAL MEDICINE

## 2022-08-11 PROCEDURE — 93005 ELECTROCARDIOGRAM TRACING: CPT | Performed by: SPECIALIST

## 2022-08-11 PROCEDURE — 82962 GLUCOSE BLOOD TEST: CPT

## 2022-08-11 PROCEDURE — 4A023N7 MEASUREMENT OF CARDIAC SAMPLING AND PRESSURE, LEFT HEART, PERCUTANEOUS APPROACH: ICD-10-PCS | Performed by: INTERNAL MEDICINE

## 2022-08-11 PROCEDURE — B2151ZZ FLUOROSCOPY OF LEFT HEART USING LOW OSMOLAR CONTRAST: ICD-10-PCS | Performed by: INTERNAL MEDICINE

## 2022-08-11 PROCEDURE — 94799 UNLISTED PULMONARY SVC/PX: CPT

## 2022-08-11 PROCEDURE — 25010000002 HEPARIN (PORCINE) 25000-0.45 UT/250ML-% SOLUTION: Performed by: EMERGENCY MEDICINE

## 2022-08-11 RX ORDER — MIDAZOLAM HYDROCHLORIDE 1 MG/ML
INJECTION INTRAMUSCULAR; INTRAVENOUS AS NEEDED
Status: DISCONTINUED | OUTPATIENT
Start: 2022-08-11 | End: 2022-08-11 | Stop reason: HOSPADM

## 2022-08-11 RX ORDER — ATORVASTATIN CALCIUM 80 MG/1
80 TABLET, FILM COATED ORAL DAILY
Qty: 90 TABLET
Start: 2022-08-12

## 2022-08-11 RX ORDER — LORAZEPAM 2 MG/ML
INJECTION INTRAMUSCULAR AS NEEDED
Status: DISCONTINUED | OUTPATIENT
Start: 2022-08-11 | End: 2022-08-11 | Stop reason: HOSPADM

## 2022-08-11 RX ORDER — ONDANSETRON 4 MG/1
4 TABLET, FILM COATED ORAL EVERY 6 HOURS PRN
Status: DISCONTINUED | OUTPATIENT
Start: 2022-08-11 | End: 2022-08-12 | Stop reason: HOSPADM

## 2022-08-11 RX ORDER — SODIUM CHLORIDE 9 MG/ML
100 INJECTION, SOLUTION INTRAVENOUS CONTINUOUS
Status: ACTIVE | OUTPATIENT
Start: 2022-08-11 | End: 2022-08-11

## 2022-08-11 RX ORDER — MORPHINE SULFATE 2 MG/ML
1 INJECTION, SOLUTION INTRAMUSCULAR; INTRAVENOUS EVERY 4 HOURS PRN
Status: DISCONTINUED | OUTPATIENT
Start: 2022-08-11 | End: 2022-08-12 | Stop reason: HOSPADM

## 2022-08-11 RX ORDER — NITROGLYCERIN 20 MG/100ML
5-200 INJECTION INTRAVENOUS
Start: 2022-08-11

## 2022-08-11 RX ORDER — ONDANSETRON 2 MG/ML
4 INJECTION INTRAMUSCULAR; INTRAVENOUS EVERY 6 HOURS PRN
Status: DISCONTINUED | OUTPATIENT
Start: 2022-08-11 | End: 2022-08-12 | Stop reason: HOSPADM

## 2022-08-11 RX ORDER — FENTANYL CITRATE 50 UG/ML
INJECTION, SOLUTION INTRAMUSCULAR; INTRAVENOUS AS NEEDED
Status: DISCONTINUED | OUTPATIENT
Start: 2022-08-11 | End: 2022-08-11 | Stop reason: HOSPADM

## 2022-08-11 RX ORDER — MORPHINE SULFATE 2 MG/ML
1 INJECTION, SOLUTION INTRAMUSCULAR; INTRAVENOUS EVERY 6 HOURS PRN
Status: DISCONTINUED | OUTPATIENT
Start: 2022-08-11 | End: 2022-08-12 | Stop reason: HOSPADM

## 2022-08-11 RX ORDER — ACETAMINOPHEN 325 MG/1
650 TABLET ORAL EVERY 4 HOURS PRN
Status: DISCONTINUED | OUTPATIENT
Start: 2022-08-11 | End: 2022-08-12 | Stop reason: HOSPADM

## 2022-08-11 RX ORDER — RANOLAZINE 500 MG/1
500 TABLET, EXTENDED RELEASE ORAL EVERY 12 HOURS SCHEDULED
Status: DISCONTINUED | OUTPATIENT
Start: 2022-08-11 | End: 2022-08-12 | Stop reason: HOSPADM

## 2022-08-11 RX ORDER — PANTOPRAZOLE SODIUM 40 MG/1
40 TABLET, DELAYED RELEASE ORAL
Status: DISCONTINUED | OUTPATIENT
Start: 2022-08-11 | End: 2022-08-12 | Stop reason: HOSPADM

## 2022-08-11 RX ORDER — CLOPIDOGREL BISULFATE 75 MG/1
75 TABLET ORAL DAILY
Qty: 30 TABLET
Start: 2022-08-12

## 2022-08-11 RX ORDER — PANTOPRAZOLE SODIUM 40 MG/1
40 TABLET, DELAYED RELEASE ORAL
Start: 2022-08-12

## 2022-08-11 RX ORDER — NALOXONE HCL 0.4 MG/ML
0.4 VIAL (ML) INJECTION
Status: DISCONTINUED | OUTPATIENT
Start: 2022-08-11 | End: 2022-08-12 | Stop reason: HOSPADM

## 2022-08-11 RX ADMIN — METOPROLOL TARTRATE 25 MG: 25 TABLET, FILM COATED ORAL at 21:26

## 2022-08-11 RX ADMIN — SODIUM CHLORIDE 250 ML: 9 INJECTION, SOLUTION INTRAVENOUS at 01:30

## 2022-08-11 RX ADMIN — ONDANSETRON 4 MG: 2 INJECTION INTRAMUSCULAR; INTRAVENOUS at 03:52

## 2022-08-11 RX ADMIN — ASPIRIN 81 MG CHEWABLE TABLET 81 MG: 81 TABLET CHEWABLE at 09:05

## 2022-08-11 RX ADMIN — ATORVASTATIN CALCIUM 80 MG: 40 TABLET, FILM COATED ORAL at 09:05

## 2022-08-11 RX ADMIN — RANOLAZINE 500 MG: 500 TABLET, FILM COATED, EXTENDED RELEASE ORAL at 21:26

## 2022-08-11 RX ADMIN — MORPHINE SULFATE 1 MG: 2 INJECTION, SOLUTION INTRAMUSCULAR; INTRAVENOUS at 00:36

## 2022-08-11 RX ADMIN — MORPHINE SULFATE 1 MG: 2 INJECTION, SOLUTION INTRAMUSCULAR; INTRAVENOUS at 00:25

## 2022-08-11 RX ADMIN — LORAZEPAM 0.5 MG: 2 INJECTION INTRAMUSCULAR; INTRAVENOUS at 01:14

## 2022-08-11 RX ADMIN — HEPARIN SODIUM 12 UNITS/KG/HR: 10000 INJECTION, SOLUTION INTRAVENOUS at 09:04

## 2022-08-11 RX ADMIN — CLOPIDOGREL BISULFATE 75 MG: 75 TABLET ORAL at 09:05

## 2022-08-11 RX ADMIN — Medication 10 ML: at 09:05

## 2022-08-11 RX ADMIN — MORPHINE SULFATE 1 MG: 2 INJECTION, SOLUTION INTRAMUSCULAR; INTRAVENOUS at 00:43

## 2022-08-11 RX ADMIN — SODIUM CHLORIDE 100 ML/HR: 9 INJECTION, SOLUTION INTRAVENOUS at 17:48

## 2022-08-11 RX ADMIN — METOPROLOL TARTRATE 25 MG: 25 TABLET, FILM COATED ORAL at 09:05

## 2022-08-11 NOTE — NURSING NOTE
Pt called out stating his chest pain was coming back and continued to call out while staff was retrieving medication to treat.  Upon entering room, patient was highly agitated and shaking stating his pain was 10/10. L chest sharp stabbing pain.  Morphine given x 2 and nitro adjusted per protocol until CP began to resolve.  EKG and troponin ordered.  On call cardiologist notified.  Ordered to change metorprolol to 25 BID and change morphine to q5 min x 3 doses every 6 hours for unresolved chest pain.  No further orders at this time.  Patient pain resolved with titration of nitro drip and morphine.  Pt appears to be highly anxious and unable to relax and allow medications appropriate time to take effect.  Ativan given to help pt relax.  Signs and symptoms of anxiety decrease and patient was able to relax.  Pressure became low.  Bolus given per protocol.  Pressure returned to normal limits.  Patient later woken with extreme anxiety regarding why he was sleeping.  Pressure began to increase again.  Once patient was able to relax and return to sleeping, pressure returned to normal.  No s/s of distress at this time.  No chest pain reported.  Nitro drip continued at current rate in the absence of chest pain.  CTM

## 2022-08-11 NOTE — CASE MANAGEMENT/SOCIAL WORK
Discharge Planning Assessment   [facility]     Patient Name: Andrea Odom  MRN: [id number]  Today's Date: 8/11/2022    Admit Date: 8/10/2022     Discharge Needs Assessment     Row Name 08/11/22 1237       Living Environment    People in Home alone    Current Living Arrangements home    Primary Care Provided by self    Family Caregiver if Needed none    Quality of Family Relationships supportive    Able to Return to Prior Arrangements yes       Resource/Environmental Concerns    Resource/Environmental Concerns none    Transportation Concerns none       Transition Planning    Patient/Family Anticipates Transition to home    Patient/Family Anticipated Services at Transition none    Transportation Anticipated family or friend will provide       Discharge Needs Assessment    Readmission Within the Last 30 Days current reason for admission unrelated to previous admission    Anticipated Changes Related to Illness none               Discharge Plan     Row Name 08/11/22 1238       Plan    Plan CM attempted to complete assessment- patient very tired- will come back to see patient in am.  Patient was on Heparin drip- stopped for [facility] this afternoon. Patient is currently on Nitro drip- patient was at different hospital for COVID- Patient states he is independent at home with self. CM will f/u in am. Peak Troponin 0.193, LDL 57.              Continued Care and Services - Admitted Since 8/10/2022    Coordination has not been started for this encounter.          Demographic Summary     Row Name 08/11/22 1222       General Information    Admission Type inpatient    Arrived From emergency department    Referral Source admission list    Preferred Language English               Functional Status    No documentation.                Psychosocial    No documentation.                Abuse/Neglect    No documentation.                Legal     Row Name 08/11/22 1234       Financial/Legal    Source of Income social security       Legal     Criminal Activity/Legal Involvement none               Substance Abuse    No documentation.                Patient Forms    No documentation.                   Barbara Sigala RN

## 2022-08-11 NOTE — CONSULTS
[facility]   Interventional Cardiology Consult Note    Patient Name: Andrea Odom  : 1940  MRN: [id number]  Primary Care Physician:  Mary Moya APRN  Referring Physician: Jenifer Swartz MD  Date of admission: 8/10/2022    Subjective   Subjective     Reason for Consultation : NSTEMI, cardiac catheterization    Chief Complaint : Chest pain     HPI:  Andrea Odom is a 82 y.o. male with multivessel coronary disease, previous bypass grafting and angioplasties, aortic stenosis, status post TAVR, diabetes mellitus, hypertension, recent COVID-19 infection who was admitted on 8/10/2022 because of chest pain.  Troponin is mildly elevated and he continued to have intermittent episodes of chest pain, for which he was put on nitroglycerin infusion along with heparin infusion.  Interventional cardiologist consulted to assist with Cardiac catheterization today.    He currently denies any active chest pain.  Reports some shortness of breath and fatigue.  He generally follows up with Dr. Beckman for cardiac care    Review of Systems   All systems were reviewed and negative except for: Intermittent chest pain, fatigue.  Negative for shortness of breath or palpitations.    Personal History     Past Medical History:   Diagnosis Date   • Diabetes mellitus (HCC)    • GERD (gastroesophageal reflux disease)    • Hypertension    Coronary artery disease  Severe aortic stenosis, previous TAVR      Family History: Reviewed, no family history of premature coronary disease    Social History:  reports that he has never smoked. He does not have any smokeless tobacco history on file. He reports that he does not drink alcohol and does not use drugs.    Home Medications:  amLODIPine, aspirin, cetirizine, glimepiride, levothyroxine, nitroglycerin, and rosuvastatin    Allergies:  Allergies   Allergen Reactions   • Latex, Natural Rubber Rash   • Pantoprazole Unknown - Low Severity   • Penicillins Dizziness          •  "Sulfa Antibiotics Other (See Comments)     \"FEEL BAD\"       Objective    Objective     Vitals:   Temp:  [97.6 °F (36.4 °C)-98.1 °F (36.7 °C)] 97.8 °F (36.6 °C)  Heart Rate:  [55-86] 58  Resp:  [16-20] 16  BP: ()/(44-97) 95/56  Flow (L/min):  [2] 2      Physical Exam:   Constitutional: Awake, alert, No acute distress    Eyes: PERRLA, sclerae anicteric, no conjunctival injection   HENT: NCAT, mucous membranes moist   Neck: Supple, no thyromegaly, no lymphadenopathy, trachea midline   Respiratory: Clear to auscultation bilaterally, nonlabored respirations    Cardiovascular: RRR, no murmurs, rubs, or gallops, palpable pedal pulses bilaterally   Gastrointestinal: Positive bowel sounds, soft, nontender, nondistended   Musculoskeletal: No bilateral ankle edema, no clubbing or cyanosis to extremities   Psychiatric: Appropriate affect, cooperative   Neurologic: Oriented x 3, strength symmetric in all extremities, Cranial Nerves grossly intact to confrontation, speech clear   Skin: No rashes     Result Review    Result Review:  I have personally reviewed the results from the time of this admission to 8/11/2022 17:41 EDT and agree with these findings:  [x]  Laboratory  []  Microbiology  [x]  Radiology  [x]  EKG/Telemetry   [x]  Cardiology/Vascular   []  Pathology  [x]  Old records  []  Other:  Most notable findings include:     CMP    CMP 6/9/22 8/10/22 8/11/22   Glucose 93 103 (A) 111 (A)   BUN 15 12 13   Creatinine 1.21 0.89 1.04   Sodium 137 135 (A) 137   Potassium 4.1 4.0 4.7   Chloride 98 100 101   Calcium 10.4 9.4 10.2   Albumin 5.30 (A) 4.40 4.70   Total Bilirubin 0.6 0.5 0.6   Alkaline Phosphatase 87 61 72   AST (SGOT) 18 15 22   ALT (SGPT) 9 17 16   (A) Abnormal value             CBC    CBC 6/9/22 8/10/22 8/11/22   WBC 6.06 6.13 5.49   RBC 4.57 4.10 (A) 4.37   Hemoglobin 14.0 12.7 (A) 13.6   Hematocrit 42.1 37.4 (A) 40.1   MCV 92.1 91.2 91.8   MCH 30.6 31.0 31.1   MCHC 33.3 34.0 33.9   RDW 12.7 13.7 13.9 "   Platelets 139 (A) 153 170   (A) Abnormal value             Lab Results   Component Value Date    TROPONINT 0.126 (C) 08/11/2022         EKG done on 8/11/2022 showed sinus rhythm, left atrial enlargement, old inferior infarct nonspecific T wave abnormalities.    Assessment & Plan   Assessment / Plan     Brief Patient Summary:  Andrea Odom is a 82 y.o. male with multivessel coronary artery disease, status post TAVR, diabetes mellitus who presents with chest pain.  Cardiac markers including troponin was noted to be mildly elevated.    Active Hospital Problems:  Active Hospital Problems    Diagnosis    • NSTEMI (non-ST elevated myocardial infarction) (HCC)    • Acute chest pain      Non-STEMI/coronary artery disease : Cardiac catheterization performed today which showed native severe three-vessel coronary artery disease.  Only surviving graft is LIMA to LAD with the right coronary artery fills by collaterals from left to right side.  The stent in left circumflex artery is chronically occluded, the stent jails OM1 branch.    Plan:     At this time, we will continue maximal medical management  Start Ranexa 500 mg twice daily  Continue aspirin, Plavix, statin    I discussed the angiograms with Dr. Wolfe, interventional cardiologist at [place], who did angioplasties in the past for this patient.  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    Patient to be transferred to higher center for high risk angioplasty.    Discussed with the hospitalist team and Dr. Swartz        Electronically signed by Satish Singh MD, 08/11/22, 5:41 PM EDT.

## 2022-08-11 NOTE — PLAN OF CARE
Goal Outcome Evaluation:            See notes and MAR regarding overnight events.  No s/s of distress and no complaints at this time.  Awaiting rounding by cardiology to dictate further course of care.  Troponins trending down.  No chest pain at this time.  CTM

## 2022-08-11 NOTE — PROGRESS NOTES
"CARDIOLOGY  INPATIENT PROGRESS NOTE                [facility] 4TH FLOOR MEDICAL TELEMETRY UNIT    8/11/2022      PATIENT IDENTIFICATION:   Name:  Andrea Odom      MRN:  [id number]     82 y.o.  male                 SUBJECTIVE:   Patient underwent cardiac cath coronary angiography today and it showed severe coronary disease with occluded right coronary artery and right coronary artery vein graft.  Circumflex stent was patent however there was significant disease in the marginal branches which were jailed by the stent.  LIMA graft was patent to the LAD and the LAD was completely occluded at origin.  The angiogram not significantly changed from 2019    OBJECTIVE:  Vitals:    08/11/22 0730 08/11/22 0843 08/11/22 1100 08/11/22 1546   BP: 99/57  95/56    BP Location: Right arm  Left arm    Patient Position: Lying  Lying    Pulse: 75  58    Resp: 16  16    Temp: 97.8 °F (36.6 °C)      TempSrc: Oral      SpO2: 98% 96% 94% 100%   Weight:       Height:               Body mass index is 26.18 kg/m².    Intake/Output Summary (Last 24 hours) at 8/11/2022 1744  Last data filed at 8/11/2022 0552  Gross per 24 hour   Intake 488.39 ml   Output 1675 ml   Net -1186.61 ml       Telemetry:     Physical Exam  General Appearance:   · no acute distress  · Alert and oriented x3  HENT:   · lips not cyanotic  · Atraumatic  Neck:  · No jvd   · supple  Respiratory:  · no respiratory distress  · normal breath sounds  · no rales  Cardiovascular:    · regular rhythm  · no S3, no S4   · no murmur  · no rub  · lower extremity edema: none    Skin:   · warm, dry  · No rashes      Allergies   Allergen Reactions   • Latex, Natural Rubber Rash   • Pantoprazole Unknown - Low Severity   • Penicillins Dizziness          • Sulfa Antibiotics Other (See Comments)     \"FEEL BAD\"       Scheduled meds:  aspirin, 81 mg, Oral, Daily  atorvastatin, 80 mg, Oral, Daily  clopidogrel, 75 mg, Oral, Daily  insulin lispro, 0-7 Units, Subcutaneous, TID " AC  levothyroxine, 75 mcg, Oral, Q AM  metoprolol tartrate, 25 mg, Oral, Q12H  pantoprazole, 40 mg, Oral, Q AM  sodium chloride, 250 mL, Intravenous, Once  sodium chloride, 10 mL, Intravenous, Q12H  sodium chloride, 10 mL, Intravenous, Q12H      IV meds:                      nitroglycerin, 5-200 mcg/min, Last Rate: 15 mcg/min (08/11/22 0550)  sodium chloride, 100 mL/hr        Data Review:  CBC    CBC 6/9/22 8/10/22 8/11/22   WBC 6.06 6.13 5.49   RBC 4.57 4.10 (A) 4.37   Hemoglobin 14.0 12.7 (A) 13.6   Hematocrit 42.1 37.4 (A) 40.1   MCV 92.1 91.2 91.8   MCH 30.6 31.0 31.1   MCHC 33.3 34.0 33.9   RDW 12.7 13.7 13.9   Platelets 139 (A) 153 170   (A) Abnormal value            CMP    CMP 6/9/22 8/10/22 8/11/22   Glucose 93 103 (A) 111 (A)   BUN 15 12 13   Creatinine 1.21 0.89 1.04   Sodium 137 135 (A) 137   Potassium 4.1 4.0 4.7   Chloride 98 100 101   Calcium 10.4 9.4 10.2   Albumin 5.30 (A) 4.40 4.70   Total Bilirubin 0.6 0.5 0.6   Alkaline Phosphatase 87 61 72   AST (SGOT) 18 15 22   ALT (SGPT) 9 17 16   (A) Abnormal value             CARDIAC LABS:      Lab 08/11/22  1439 08/11/22  0126 08/10/22  1828 08/10/22  1542 08/10/22  0529   PROBNP  --   --   --   --  818.4   TROPONIN T  --  0.126* 0.193* 0.171*  --    PROTIME 12.8  --   --   --   --    INR 0.95  --   --   --   --         No results found for: DIGOXIN   Lab Results   Component Value Date    TSH 0.29 (L) 06/13/2022     Results from last 7 days   Lab Units 08/10/22  0618   CHOLESTEROL mg/dL 130   HDL CHOL mg/dL 50     Lab Results   Component Value Date    POCTROP 0.00 08/10/2022     Lab Results   Component Value Date    TROPONINT 0.126 (C) 08/11/2022   (  Lab Results   Component Value Date    MG 2.1 08/10/2022              ASSESSMENT:    Acute chest pain    NSTEMI (non-ST elevated myocardial infarction) (HCC)  Status post TAVR in 2020 for aortic stenosis  Coronary bypass surgery x2 in 1989 and stent placement in the left main and circumflex coronary artery in  2015  Hypertensive heart disease      PLAN:   Patient has significant coronary disease.  We will try to contact [place] interventionalists who did his previous stents to see if his stent can be placed in the marginal branch.  Otherwise treat him medically            Allison Lazcano MD  8/11/2022    17:44 EDT

## 2022-08-11 NOTE — PLAN OF CARE
Goal Outcome Evaluation:              Outcome Evaluation: pt has been stable since arrival onto unit. no concerns noted.

## 2022-08-11 NOTE — PLAN OF CARE
Nutrition Note    Patient followed by RD for MST score of 2 related to reported wt loss and decreased appetite. He has remained wt stable for several years. Is NPO at this time, will monitor for diet advancement. Patient is at low risk per nutrition risk screening. No acute nutrition intervention at this time. RD will continue to follow and monitor per protocol.     Roxann Santa RD 08:50 EDT

## 2022-08-11 NOTE — DISCHARGE SUMMARY
Physician Discharge Summary  Patient Identification  PATIENT IDENTIFICATION    Name: Andrea Odom  :  1940  MRN: [id number]    Admit date: 8/10/2022    Discharge date: 2022     Admitting Physician: Joseph Le MD     Discharge Physician: Joseph Le MD     Admission Diagnoses: Unstable angina (HCC) [I20.0]  Acute chest pain [R07.9]  NSTEMI (non-ST elevated myocardial infarction) (HCC) [I21.4]    Hospital Problems:   Principal Problem:  <principal problem not specified>   Active Problems:  Problems Addressed this Visit        Cardiac and Vasculature    Acute chest pain    NSTEMI (non-ST elevated myocardial infarction) (HCC) - Primary    Relevant Medications    nitroglycerin (NITROSTAT) 0.4 MG SL tablet    clopidogrel (PLAVIX) 75 MG tablet (Start on 2022)    metoprolol tartrate (LOPRESSOR) 25 MG tablet    nitroglycerin (TRIDIL) 200 mcg/mL infusion    Other Relevant Orders    Case Request Cath Lab: Left Heart Cath, Possible Percutaneous Coronary Intervention (Completed)    Cardiac Catheterization/Vascular Study (Completed)      Other Visit Diagnoses     Unstable angina (HCC)        Relevant Medications    nitroglycerin (NITROSTAT) 0.4 MG SL tablet    clopidogrel (PLAVIX) 75 MG tablet (Start on 2022)    metoprolol tartrate (LOPRESSOR) 25 MG tablet    nitroglycerin (TRIDIL) 200 mcg/mL infusion      Diagnoses       Codes Comments    NSTEMI (non-ST elevated myocardial infarction) ([facility])    -  Primary ICD-10-CM: I21.4  ICD-9-CM: 410.70     Acute chest pain     ICD-10-CM: R07.9  ICD-9-CM: 786.50     Unstable angina (HCC)     ICD-10-CM: I20.0  ICD-9-CM: 411.1            Discharged Condition: stable    Consults: IP CONSULT TO CARDIOLOGY  IP CONSULT TO HOSPITALIST  IP CONSULT TO CARDIOLOGY    Imaging:   Imaging Results (Last 24 Hours)     ** No results found for the last 24 hours. **          Labs:   Lab Results (last 24 hours)     Procedure Component Value Units Date/Time    POC  Glucose Once [[id number]]  (Normal) Collected: 08/11/22 1749    Specimen: Blood Updated: 08/11/22 1750     Glucose 81 mg/dL      Comment: Serial Number: [id number]:  [id number]       CK [[id number]]  (Normal) Collected: 08/11/22 1439    Specimen: Blood Updated: 08/11/22 1708     Creatine Kinase 100 U/L     CK-MB [[id number]]  (Normal) Collected: 08/11/22 1439    Specimen: Blood Updated: 08/11/22 1706     CKMB 7.28 ng/mL     Narrative:      Results may be falsely decreased if patient taking Biotin.      POC Activated Clotting Time [[id number]]  (Normal) Collected: 08/11/22 1641    Specimen: Blood Updated: 08/11/22 1647     Activated Clotting Time  98 Seconds      Comment: Serial Number: [id number]:  [id number]       COVID-19,APTIMA PANTHER(MUMTAZ),[facility], NP/OP SWAB IN UTM/VTM/SALINE TRANSPORT MEDIA,24 HR TAT - Swab, Nasopharynx [[id number]]  (Abnormal) Collected: 08/11/22 1104    Specimen: Swab from Nasopharynx Updated: 08/11/22 1603     COVID19 Detected    Narrative:      Fact sheet for providers: https://www.fda.gov/media/270836/download     Fact sheet for patients: https://www.fda.gov/media/427113/download    Test performed by RT PCR.    Comprehensive Metabolic Panel [[id number]]  (Abnormal) Collected: 08/11/22 1439    Specimen: Blood Updated: 08/11/22 1506     Glucose 111 mg/dL      BUN 13 mg/dL      Creatinine 1.04 mg/dL      Sodium 137 mmol/L      Potassium 4.7 mmol/L      Chloride 101 mmol/L      CO2 27.6 mmol/L      Calcium 10.2 mg/dL      Total Protein 7.3 g/dL      Albumin 4.70 g/dL      ALT (SGPT) 16 U/L      AST (SGOT) 22 U/L      Alkaline Phosphatase 72 U/L      Total Bilirubin 0.6 mg/dL      Globulin 2.6 gm/dL      A/G Ratio 1.8 g/dL      BUN/Creatinine Ratio 12.5     Anion Gap 8.4 mmol/L      eGFR 71.7 mL/min/1.73      Comment: National Kidney Foundation and American Society of Nephrology (ASN) Task Force recommended calculation based on the Chronic Kidney Disease Epidemiology Collaboration  (CKD-EPI) equation refit without adjustment for race.       Narrative:      GFR Normal >60  Chronic Kidney Disease <60  Kidney Failure <15      aPTT [[id number]]  (Normal) Collected: 08/11/22 1439    Specimen: Blood Updated: 08/11/22 1505     PTT 25.4 seconds     Protime-INR [[id number]]  (Normal) Collected: 08/11/22 1439    Specimen: Blood Updated: 08/11/22 1504     Protime 12.8 Seconds      INR 0.95    Narrative:      Suggested Therapeutic Ranges For Oral Anticoagulant Therapy:  Level of Therapy                      INR Target Range  Standard Dose                            2.0-3.0  High Dose                                2.5-3.5  Patients not receiving anticoagulant  Therapy Normal Range                     0.86-1.15    CBC & Differential [[id number]]  (Abnormal) Collected: 08/11/22 1439    Specimen: Blood Updated: 08/11/22 1455    Narrative:      The following orders were created for panel order CBC & Differential.  Procedure                               Abnormality         Status                     ---------                               -----------         ------                     CBC Auto Differential[[id number]]        Abnormal            Final result                 Please view results for these tests on the individual orders.    CBC Auto Differential [[id number]]  (Abnormal) Collected: 08/11/22 1439    Specimen: Blood Updated: 08/11/22 1455     WBC 5.49 10*3/mm3      RBC 4.37 10*6/mm3      Hemoglobin 13.6 g/dL      Hematocrit 40.1 %      MCV 91.8 fL      MCH 31.1 pg      MCHC 33.9 g/dL      RDW 13.9 %      RDW-SD 46.5 fl      MPV 10.4 fL      Platelets 170 10*3/mm3      Neutrophil % 76.0 %      Lymphocyte % 16.8 %      Monocyte % 6.0 %      Eosinophil % 0.5 %      Basophil % 0.5 %      Immature Grans % 0.2 %      Neutrophils, Absolute 4.17 10*3/mm3      Lymphocytes, Absolute 0.92 10*3/mm3      Monocytes, Absolute 0.33 10*3/mm3      Eosinophils, Absolute 0.03 10*3/mm3      Basophils, Absolute 0.03 10*3/mm3       Immature Grans, Absolute 0.01 10*3/mm3      nRBC 0.0 /100 WBC     POC Glucose Once [[id number]]  (Abnormal) Collected: 08/11/22 1212    Specimen: Blood Updated: 08/11/22 1216     Glucose 143 mg/dL      Comment: Serial Number: [id number]:  [id number]       POC Glucose Once [[id number]]  (Abnormal) Collected: 08/11/22 0821    Specimen: Blood Updated: 08/11/22 0822     Glucose 104 mg/dL      Comment: Serial Number: [id number]:  [id number]       Troponin [[id number]]  (Abnormal) Collected: 08/11/22 0126    Specimen: Blood Updated: 08/11/22 0209     Troponin T 0.126 ng/mL     Narrative:      Troponin T Reference Range:  <= 0.03 ng/mL-   Negative for AMI  >0.03 ng/mL-     Abnormal for myocardial necrosis.  Clinicians would have to utilize clinical acumen, EKG, Troponin and serial changes to determine if it is an Acute Myocardial Infarction or myocardial injury due to an underlying chronic condition.       Results may be falsely decreased if patient taking Biotin.      aPTT [[id number]]  (Abnormal) Collected: 08/11/22 0126    Specimen: Blood Updated: 08/11/22 0150     PTT 44.1 seconds     POC Glucose Once [[id number]]  (Abnormal) Collected: 08/11/22 0027    Specimen: Blood Updated: 08/11/22 0028     Glucose 113 mg/dL      Comment: Serial Number: [id number]:  [id number]       aPTT [[id number]]  (Abnormal) Collected: 08/10/22 2216    Specimen: Blood Updated: 08/10/22 2238     PTT 38.6 seconds     Troponin [[id number]]  (Abnormal) Collected: 08/10/22 1828    Specimen: Blood Updated: 08/10/22 1953     Troponin T 0.193 ng/mL     Narrative:      Troponin T Reference Range:  <= 0.03 ng/mL-   Negative for AMI  >0.03 ng/mL-     Abnormal for myocardial necrosis.  Clinicians would have to utilize clinical acumen, EKG, Troponin and serial changes to determine if it is an Acute Myocardial Infarction or myocardial injury due to an underlying chronic condition.       Results may be falsely decreased if patient taking  Biotin.      aPTT [[id number]]  (Abnormal) Collected: 08/10/22 1828    Specimen: Blood Updated: 08/10/22 1912     PTT 42.1 seconds             Hospital Course:   83 y/o male with a h/o HTN, DM2, hypothyroidism, recent COVID-19 infection at end of July, AS s/p TAVR, CAD s/p CABG with prior stenting who presents with substernal chest pressure and high BP    NSTEMI with a h/o CAD s/p CABG and prior stenting   - trop neg x2 initially, then trended up (0.171 --> 0.193 --> 0.126)  - home asa, plavix. Started high intensity statin and BBlocker  - heparin gtt stopped post-cath per cardiology recs  - nitro gtt continues for ongoing chest pain  - repeat echo pending at the time of discharge  - s/p LHC that showed significant CAD with occluded RCA and RCA vein graft.  Cx stent was patent however there was significant disease in the marginal branches which were jailed by the stent.  LIMA graft was patent to the LAD and the LAD was completely occluded at origin  - Dr Lopez discussed with Dr Jah Wolfe at [facility] who is willing to accept the patient in transfer for high risk angioplasty     VHD  - sev AS in the past s/p TAVR  - echo pending at discharge     Recent covid-19 infection  - positive on 7/23/22  - CXR is clear  - do not think this is playing a role in current symptoms and dod not believe he is still infectious  - no e/o DVT on venous duplex     DM2  - holding home amaryl and metformin  - SSI  - A1c 6.1     HTN  - held home amlodipine, lisinopril due to low BP  - metoprolol added for above     Hypothyroidism  - synthroid    Discharge Exam:  Gen: up in bed, in NAD  CV:  RRR, no m/r/g, no peripheral edema  Resp: CTAB, no increase work of breathing  Abd: soft, NT, ND, bs present  Neuro: moves all 4 ext, following commands  Ext: no clubbing, cyanosis or edema  Psych: AAOx3, pleasant affect    Disposition: [facility]    Patient Discharge Medications:      Discharge Medications      New Medications      Instructions  Start Date   atorvastatin 80 MG tablet  Commonly known as: LIPITOR   80 mg, Oral, Daily   Start Date: August 12, 2022     clopidogrel 75 MG tablet  Commonly known as: PLAVIX   75 mg, Oral, Daily   Start Date: August 12, 2022     metoprolol tartrate 25 MG tablet  Commonly known as: LOPRESSOR   25 mg, Oral, Every 12 Hours Scheduled      nitroglycerin 200 mcg/mL infusion  Commonly known as: TRIDIL   5-200 mcg/min (5-200 mcg/min), Intravenous, Titrated      pantoprazole 40 MG EC tablet  Commonly known as: PROTONIX   40 mg, Oral, Every Early Morning   Start Date: August 12, 2022        Continue These Medications      Instructions Start Date   amLODIPine 5 MG tablet  Commonly known as: NORVASC   5 mg, Oral, Daily      aspirin 81 MG chewable tablet   81 mg, Oral, Daily      cetirizine 10 MG tablet  Commonly known as: zyrTEC   10 mg, Oral, Daily      glimepiride 2 MG tablet  Commonly known as: AMARYL   2 mg, Oral, Daily      levothyroxine 75 MCG tablet  Commonly known as: SYNTHROID, LEVOTHROID   75 mcg, Oral, Daily      nitroglycerin 0.4 MG SL tablet  Commonly known as: NITROSTAT   0.4 mg, Oral, Every 5 Minutes PRN         Stop These Medications    rosuvastatin 20 MG tablet  Commonly known as: CRESTOR           Follow-up Information     Mary Moya, APRN .    Specialty: Nurse Practitioner  Contact information:  [address] [place] [id number] [phone]                             Signed:  Madhu Le MD  Hospitalist Group  8/11/2022  18:36 EDT      I spent 37 minutes arranging and coordinating discharge and reviewing medications with majority of time spent counseling patient

## 2022-08-12 NOTE — NURSING NOTE
notified of transfer. EMTALA  Verified by . Report called to Toya DURHAM on & towers. [facility] notified of need for transfer. Will continue to monitor and update as needed.

## 2022-08-12 NOTE — NURSING NOTE
Patient transported by [facility] to [facility] with 2 attendants present. Nitro drip infusing. No c/o chest pain at this time. Toya DURHAM notified of patient leaving facility, current nitro rate, and vitals.

## 2022-08-12 NOTE — CASE MANAGEMENT/SOCIAL WORK
OBS 8/10/22. IP 8/11/22. Met. DC 8/11/22. LOS < 2MN - Transferred to [facility].      Dr. Singh 8/11/22 :   I discussed the angiograms with Dr. Wolfe, interventional cardiologist at [place], who did angioplasties in the past for this patient.  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     Patient to be transferred to [facility] for high risk angioplasty.

## 2022-08-14 LAB — QT INTERVAL: 436 MS

## 2022-08-15 LAB
ASCENDING AORTA: 3.6 CM
BH CV ECHO MEAS - AO MAX PG: 10 MMHG
BH CV ECHO MEAS - AO MEAN PG: 5 MMHG
BH CV ECHO MEAS - AO ROOT DIAM: 3.1 CM
BH CV ECHO MEAS - AO V2 MAX: 158 CM/SEC
BH CV ECHO MEAS - AO V2 VTI: 33 CM
BH CV ECHO MEAS - AVA(I,D): 1.9 CM2
BH CV ECHO MEAS - EDV(MOD-SP2): 96 ML
BH CV ECHO MEAS - EDV(MOD-SP4): 79 ML
BH CV ECHO MEAS - EF(MOD-BP): 46 %
BH CV ECHO MEAS - ESV(MOD-SP2): 64 ML
BH CV ECHO MEAS - ESV(MOD-SP4): 36 ML
BH CV ECHO MEAS - IVSD: 1.7 CM
BH CV ECHO MEAS - LA DIMENSION: 3.4 CM
BH CV ECHO MEAS - LAT PEAK E' VEL: 8.5 CM/SEC
BH CV ECHO MEAS - LV MAX PG: 4 MMHG
BH CV ECHO MEAS - LV MEAN PG: 2 MMHG
BH CV ECHO MEAS - LV V1 MAX: 99 CM/SEC
BH CV ECHO MEAS - LV V1 VTI: 20 CM
BH CV ECHO MEAS - LVIDD: 4 CM
BH CV ECHO MEAS - LVIDS: 3.1 CM
BH CV ECHO MEAS - LVOT DIAM: 2 CM
BH CV ECHO MEAS - LVPWD: 1.2 CM
BH CV ECHO MEAS - MED PEAK E' VEL: 4.03 CM/SEC
BH CV ECHO MEAS - MV A MAX VEL: 129 CM/SEC
BH CV ECHO MEAS - MV DEC TIME: 250 MSEC
BH CV ECHO MEAS - MV E MAX VEL: 101 CM/SEC
BH CV ECHO MEAS - RAP SYSTOLE: 3 MMHG
BH CV ECHO MEAS - RVDD: 2.5 CM
BH CV ECHO MEAS - RVSP: 24 MMHG
BH CV ECHO MEAS - TR MAX PG: 21 MMHG
BH CV ECHO MEAS - TR MAX VEL: 229 CM/SEC
BH CV ECHO MEASUREMENTS AVERAGE E/E' RATIO: 16.12
LEFT ATRIUM VOLUME INDEX: 28.3 ML/M2
MAXIMAL PREDICTED HEART RATE: 138 BPM
STRESS TARGET HR: 117 BPM

## 2023-03-21 ENCOUNTER — OFFICE VISIT (OUTPATIENT)
Dept: UROLOGY | Facility: CLINIC | Age: 83
End: 2023-03-21
Payer: MEDICARE

## 2023-03-21 VITALS — RESPIRATION RATE: 15 BRPM | WEIGHT: 172 LBS | BODY MASS INDEX: 26.07 KG/M2 | HEIGHT: 68 IN

## 2023-03-21 DIAGNOSIS — N39.0 URINARY TRACT INFECTION WITHOUT HEMATURIA, SITE UNSPECIFIED: Primary | ICD-10-CM

## 2023-03-21 DIAGNOSIS — N35.919 STRICTURE OF MALE URETHRA, UNSPECIFIED STRICTURE TYPE: ICD-10-CM

## 2023-03-21 PROBLEM — R42 VERTIGO: Status: ACTIVE | Noted: 2023-03-21

## 2023-03-21 PROBLEM — E78.5 DYSLIPIDEMIA: Status: ACTIVE | Noted: 2017-09-13

## 2023-03-21 PROBLEM — F41.9 ANXIETY: Status: ACTIVE | Noted: 2021-08-24

## 2023-03-21 PROBLEM — J38.00 PARALYSIS OF GLOTTIS: Status: ACTIVE | Noted: 2023-03-21

## 2023-03-21 PROBLEM — I10 BENIGN ESSENTIAL HTN: Status: ACTIVE | Noted: 2021-08-24

## 2023-03-21 PROBLEM — Z98.890 STATUS POST CAROTID ENDARTERECTOMY: Status: ACTIVE | Noted: 2022-08-28

## 2023-03-21 PROBLEM — K21.9 GERD (GASTROESOPHAGEAL REFLUX DISEASE): Status: ACTIVE | Noted: 2021-08-24

## 2023-03-21 PROBLEM — E11.9 DIABETES MELLITUS TYPE 2 IN NONOBESE: Status: ACTIVE | Noted: 2019-07-11

## 2023-03-21 PROBLEM — I77.9 CAROTID ARTERY DISEASE: Status: ACTIVE | Noted: 2023-03-21

## 2023-03-21 PROBLEM — R49.0 VOICE HOARSENESS: Status: ACTIVE | Noted: 2023-03-21

## 2023-03-21 PROBLEM — E78.2 MIXED HYPERLIPIDEMIA: Status: ACTIVE | Noted: 2021-08-24

## 2023-03-21 PROBLEM — R10.13 EPIGASTRIC PAIN: Status: ACTIVE | Noted: 2019-07-11

## 2023-03-21 PROBLEM — E07.9 THYROID DISEASE: Status: ACTIVE | Noted: 2021-08-24

## 2023-03-21 PROBLEM — I65.23 BILATERAL CAROTID ARTERY STENOSIS: Status: ACTIVE | Noted: 2022-08-28

## 2023-03-21 PROBLEM — I24.9 ACS (ACUTE CORONARY SYNDROME): Status: ACTIVE | Noted: 2019-07-09

## 2023-03-21 PROBLEM — I35.0 AORTIC STENOSIS: Status: ACTIVE | Noted: 2017-07-05

## 2023-03-21 PROBLEM — E53.8 B12 DEFICIENCY: Status: ACTIVE | Noted: 2021-08-24

## 2023-03-21 LAB
BILIRUB BLD-MCNC: NEGATIVE MG/DL
CLARITY, POC: CLEAR
COLOR UR: YELLOW
EXPIRATION DATE: NORMAL
GLUCOSE UR STRIP-MCNC: NEGATIVE MG/DL
KETONES UR QL: NEGATIVE
LEUKOCYTE EST, POC: NEGATIVE
Lab: NORMAL
NITRITE UR-MCNC: NEGATIVE MG/ML
PH UR: 6 [PH] (ref 5–8)
PROT UR STRIP-MCNC: NEGATIVE MG/DL
RBC # UR STRIP: NEGATIVE /UL
SP GR UR: 1.01 (ref 1–1.03)
URINE VOLUME: 0
UROBILINOGEN UR QL: NORMAL

## 2023-03-21 PROCEDURE — 51798 US URINE CAPACITY MEASURE: CPT | Performed by: NURSE PRACTITIONER

## 2023-03-21 PROCEDURE — 1160F RVW MEDS BY RX/DR IN RCRD: CPT | Performed by: NURSE PRACTITIONER

## 2023-03-21 PROCEDURE — 81003 URINALYSIS AUTO W/O SCOPE: CPT | Performed by: NURSE PRACTITIONER

## 2023-03-21 PROCEDURE — 1159F MED LIST DOCD IN RCRD: CPT | Performed by: NURSE PRACTITIONER

## 2023-03-21 PROCEDURE — 99212 OFFICE O/P EST SF 10 MIN: CPT | Performed by: NURSE PRACTITIONER

## 2023-03-21 RX ORDER — AMOXICILLIN 250 MG
1 CAPSULE ORAL DAILY
COMMUNITY

## 2023-03-21 RX ORDER — LISINOPRIL AND HYDROCHLOROTHIAZIDE 12.5; 1 MG/1; MG/1
1 TABLET ORAL
COMMUNITY
Start: 2022-12-08

## 2023-03-21 RX ORDER — OMEPRAZOLE 20 MG/1
1 CAPSULE, DELAYED RELEASE ORAL DAILY
COMMUNITY
Start: 2023-03-08

## 2023-03-21 RX ORDER — FLUTICASONE PROPIONATE 50 MCG
2 SPRAY, SUSPENSION (ML) NASAL DAILY
COMMUNITY
Start: 2023-02-16

## 2023-03-21 RX ORDER — ROSUVASTATIN CALCIUM 20 MG/1
20 TABLET, COATED ORAL NIGHTLY
COMMUNITY
Start: 2023-01-06

## 2023-03-21 RX ORDER — KETOCONAZOLE 20 MG/ML
SHAMPOO TOPICAL
COMMUNITY
Start: 2023-03-09

## 2023-03-21 RX ORDER — TERBINAFINE HYDROCHLORIDE 250 MG/1
1 TABLET ORAL DAILY
COMMUNITY
Start: 2023-01-19

## 2023-03-21 RX ORDER — AMLODIPINE BESYLATE 2.5 MG/1
1 TABLET ORAL EVERY 12 HOURS SCHEDULED
COMMUNITY
Start: 2022-12-27

## 2023-03-21 RX ORDER — DICLOFENAC SODIUM 75 MG/1
1 TABLET, DELAYED RELEASE ORAL EVERY 12 HOURS SCHEDULED
COMMUNITY
Start: 2023-01-19

## 2023-03-21 NOTE — PROGRESS NOTES
"Chief Complaint: Urinary Tract Infection    Subjective         History of Present Illness  Andrea Odom is a 82 y.o. male presents to [facility] to be seen for recurrent UTI.    The patient states he is with a slow ans weak stream.     He reports he has nocturia x 4     Very slow weak stream.     He states the more he strains the less he has come out.     He is circumcised.     He is on no prostate meds.     He states that he does have a splayed and splitting string as well as \"twisting\" stream      Urine cultures:    2/16/2023 Enterococcus faecalis greater than 100,000 colony-forming units per milliliter resistant to levofloxacin    9/29/2022 Enterococcus faecalis greater than 100,000 colony-forming units per milliliter resistant to ciprofloxacin, levofloxacin, tetracycline.    9/2/2022 Enterococcus faecalis greater than 100,000 colony-forming units per milliliter resistant to ciprofloxacin, levofloxacin, tetracycline    8/23/2022 Enterococcus faecalis greater than 100,000 colony-forming units per milliliter resistant to Cipro, Levaquin, tetracycline    Objective     Past Medical History:   Diagnosis Date   • Diabetes mellitus (HCC)    • GERD (gastroesophageal reflux disease)    • Hypertension        Past Surgical History:   Procedure Laterality Date   • CARDIAC CATHETERIZATION      stent placed   • CARDIAC CATHETERIZATION N/A 8/11/2022    Procedure: Left Heart Cath;  Surgeon: Allison Lazcano MD;  Location: [facility] INVASIVE LOCATION;  Service: Cardiovascular;  Laterality: N/A;   • CARDIAC CATHETERIZATION N/A 8/11/2022    Procedure: Possible Percutaneous Coronary Intervention;  Surgeon: Allison Lazcano MD;  Location: [facility] INVASIVE LOCATION;  Service: Cardiovascular;  Laterality: N/A;   • CARDIAC SURGERY           Current Outpatient Medications:   •  amLODIPine (NORVASC) 2.5 MG tablet, Take 1 tablet by mouth Every 12 (Twelve) Hours., Disp: , Rfl:   •  amLODIPine " (NORVASC) 5 MG tablet, Take 1 tablet by mouth Daily., Disp: , Rfl:   •  aspirin 81 MG chewable tablet, Chew 1 tablet Daily., Disp: , Rfl:   •  atorvastatin (LIPITOR) 80 MG tablet, Take 1 tablet by mouth Daily., Disp: 90 tablet, Rfl:   •  clopidogrel (PLAVIX) 75 MG tablet, Take 1 tablet by mouth Daily., Disp: 30 tablet, Rfl:   •  diclofenac (VOLTAREN) 75 MG EC tablet, Take 1 tablet by mouth Every 12 (Twelve) Hours., Disp: , Rfl:   •  fluticasone (FLONASE) 50 MCG/ACT nasal spray, 2 sprays Daily., Disp: , Rfl:   •  glimepiride (AMARYL) 2 MG tablet, Take 1 tablet by mouth Daily., Disp: , Rfl:   •  ketoconazole (NIZORAL) 2 % shampoo, APPLY TO THE SCALP EVERY DAY IN THE MORNING, LEAVE ON FOR 5 MINUTES AND RINSE, Disp: , Rfl:   •  levothyroxine (SYNTHROID, LEVOTHROID) 75 MCG tablet, Take 1 tablet by mouth Daily., Disp: , Rfl:   •  lisinopril-hydrochlorothiazide (PRINZIDE,ZESTORETIC) 10-12.5 MG per tablet, Take 1 tablet by mouth., Disp: , Rfl:   •  metFORMIN (GLUCOPHAGE) 500 MG tablet, Take 1 tablet by mouth 2 (Two) Times a Day With Meals., Disp: , Rfl:   •  metoprolol tartrate (LOPRESSOR) 25 MG tablet, Take 1 tablet by mouth Every 12 (Twelve) Hours., Disp: , Rfl:   •  nitroglycerin (NITROSTAT) 0.4 MG SL tablet, Take 1 tablet by mouth Every 5 (Five) Minutes As Needed., Disp: , Rfl:   •  nitroglycerin (TRIDIL) 200 mcg/mL infusion, Infuse 5-200 mcg/min into a venous catheter Dose Adjusted By Provider As Needed., Disp: , Rfl:   •  omeprazole (priLOSEC) 20 MG capsule, Take 1 capsule by mouth Daily., Disp: , Rfl:   •  pantoprazole (PROTONIX) 40 MG EC tablet, Take 1 tablet by mouth Every Morning., Disp: , Rfl:   •  rosuvastatin (CRESTOR) 20 MG tablet, Take 1 tablet by mouth Every Night., Disp: , Rfl:   •  sennosides-docusate (PERICOLACE) 8.6-50 MG per tablet, Take 1 tablet by mouth Daily., Disp: , Rfl:   •  terbinafine (lamiSIL) 250 MG tablet, Take 1 tablet by mouth Daily., Disp: , Rfl:   •  cetirizine (zyrTEC) 10 MG tablet, Take  "10 mg by mouth Daily., Disp: , Rfl:     Allergies   Allergen Reactions   • Latex, Natural Rubber Rash   • Pantoprazole Unknown - Low Severity   • Penicillins Dizziness          • Sulfa Antibiotics Other (See Comments)     \"FEEL BAD\"        History reviewed. No pertinent family history.    Social History     Socioeconomic History   • Marital status:    Tobacco Use   • Smoking status: Never   Vaping Use   • Vaping Use: Never used   Substance and Sexual Activity   • Alcohol use: Never   • Drug use: Never   • Sexual activity: Defer       Vital Signs:   Resp 15   Ht 172.7 cm (68\")   Wt 78 kg (172 lb)   BMI 26.15 kg/m²      Physical Exam  Genitourinary:             Result Review :   The following data was reviewed by: KERRY Mann on 03/21/2023:  Results for orders placed or performed in visit on 03/21/23   Bladder Scan   Result Value Ref Range    Urine Volume 0    POC Urinalysis Dipstick, Automated    Specimen: Urine   Result Value Ref Range    Color Yellow Yellow, Straw, Dark Yellow, Rose    Clarity, UA Clear Clear    Specific Gravity  1.015 1.005 - 1.030    pH, Urine 6.0 5.0 - 8.0    Leukocytes Negative Negative    Nitrite, UA Negative Negative    Protein, POC Negative Negative mg/dL    Glucose, UA Negative Negative mg/dL    Ketones, UA Negative Negative    Urobilinogen, UA Normal Normal, 0.2 E.U./dL    Bilirubin Negative Negative    Blood, UA Negative Negative    Lot Number [id number]     Expiration Date 2,024/4        Bladder Scan interpretation 03/21/2023    Estimation of residual urine via BVI 3000 Verathon Bladder Scan  MA/nurse performing: Wilmar FERREIRA RN  Residual Urine: 0 ml  Indication: Urinary tract infection without hematuria, site unspecified    Stricture of male urethra, unspecified stricture type   Position: Supine  Examination: Incremental scanning of the suprapubic area using 2.0 MHz transducer using copious amounts of acoustic gel.   Findings: An anechoic area was demonstrated which " represented the bladder, with measurement of residual urine as noted. I inspected this myself. In that the residual urine was stable or insignificant, refer to plan for treatment and plan necessary at this time.           Procedures        Assessment and Plan    Diagnoses and all orders for this visit:    1. Urinary tract infection without hematuria, site unspecified (Primary)  -     POC Urinalysis Dipstick, Automated  -     Bladder Scan    2. 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  Discussed case with Dr. Rosa and we will get him scheduled for a surgical consult to discuss options for treatment.        I spent 15 minutes caring for Andrea on this date of service. This time includes time spent by me in the following activities:reviewing tests, obtaining and/or reviewing a separately obtained history, performing a medically appropriate examination and/or evaluation , counseling and educating the patient/family/caregiver, ordering medications, tests, or procedures, and documenting information in the medical record  Follow Up   Return for With Dr. Rosa for Consult .  Patient was given instructions and counseling regarding his condition or for health maintenance advice. Please see specific information pulled into the AVS if appropriate.         This document has been electronically signed by KERRY Mann  March 21, 2023 13:56 EDT

## 2023-04-20 ENCOUNTER — HOSPITAL ENCOUNTER (EMERGENCY)
Facility: HOSPITAL | Age: 83
Discharge: HOME | End: 2023-04-20
Attending: EMERGENCY MEDICINE
Payer: MEDICARE

## 2023-04-20 ENCOUNTER — APPOINTMENT (OUTPATIENT)
Dept: GENERAL RADIOLOGY | Facility: HOSPITAL | Age: 83
End: 2023-04-20
Payer: MEDICARE

## 2023-04-20 VITALS
HEART RATE: 74 BPM | OXYGEN SATURATION: 98 % | DIASTOLIC BLOOD PRESSURE: 62 MMHG | SYSTOLIC BLOOD PRESSURE: 126 MMHG | RESPIRATION RATE: 19 BRPM | HEIGHT: 68 IN | BODY MASS INDEX: 27.03 KG/M2 | TEMPERATURE: 97.8 F | WEIGHT: 178.35 LBS

## 2023-04-20 DIAGNOSIS — N39.0 ACUTE UTI: Primary | ICD-10-CM

## 2023-04-20 LAB
ALBUMIN SERPL-MCNC: 4.3 G/DL (ref 3.5–5.2)
ALBUMIN/GLOB SERPL: 1.7 G/DL
ALP SERPL-CCNC: 73 U/L (ref 39–117)
ALT SERPL W P-5'-P-CCNC: 8 U/L (ref 1–41)
ANION GAP SERPL CALCULATED.3IONS-SCNC: 10.8 MMOL/L (ref 5–15)
AST SERPL-CCNC: 15 U/L (ref 1–40)
BACTERIA UR QL AUTO: ABNORMAL /HPF
BASOPHILS # BLD AUTO: 0.03 10*3/MM3 (ref 0–0.2)
BASOPHILS NFR BLD AUTO: 0.6 % (ref 0–1.5)
BILIRUB SERPL-MCNC: 0.4 MG/DL (ref 0–1.2)
BILIRUB UR QL STRIP: NEGATIVE
BUN SERPL-MCNC: 25 MG/DL (ref 8–23)
BUN/CREAT SERPL: 18.1 (ref 7–25)
CALCIUM SPEC-SCNC: 9.4 MG/DL (ref 8.6–10.5)
CHLORIDE SERPL-SCNC: 97 MMOL/L (ref 98–107)
CLARITY UR: CLEAR
CO2 SERPL-SCNC: 26.2 MMOL/L (ref 22–29)
COLOR UR: YELLOW
CREAT SERPL-MCNC: 1.38 MG/DL (ref 0.76–1.27)
DEPRECATED RDW RBC AUTO: 42.7 FL (ref 37–54)
EGFRCR SERPLBLD CKD-EPI 2021: 51.1 ML/MIN/1.73
EOSINOPHIL # BLD AUTO: 0.07 10*3/MM3 (ref 0–0.4)
EOSINOPHIL NFR BLD AUTO: 1.4 % (ref 0.3–6.2)
ERYTHROCYTE [DISTWIDTH] IN BLOOD BY AUTOMATED COUNT: 13 % (ref 12.3–15.4)
GLOBULIN UR ELPH-MCNC: 2.5 GM/DL
GLUCOSE BLDC GLUCOMTR-MCNC: 113 MG/DL (ref 70–99)
GLUCOSE SERPL-MCNC: 185 MG/DL (ref 65–99)
GLUCOSE UR STRIP-MCNC: NEGATIVE MG/DL
HCT VFR BLD AUTO: 38.6 % (ref 37.5–51)
HGB BLD-MCNC: 13.1 G/DL (ref 13–17.7)
HGB UR QL STRIP.AUTO: NEGATIVE
HOLD SPECIMEN: NORMAL
HOLD SPECIMEN: NORMAL
HYALINE CASTS UR QL AUTO: ABNORMAL /LPF
IMM GRANULOCYTES # BLD AUTO: 0.01 10*3/MM3 (ref 0–0.05)
IMM GRANULOCYTES NFR BLD AUTO: 0.2 % (ref 0–0.5)
KETONES UR QL STRIP: NEGATIVE
LEUKOCYTE ESTERASE UR QL STRIP.AUTO: ABNORMAL
LYMPHOCYTES # BLD AUTO: 0.77 10*3/MM3 (ref 0.7–3.1)
LYMPHOCYTES NFR BLD AUTO: 15 % (ref 19.6–45.3)
MAGNESIUM SERPL-MCNC: 1.8 MG/DL (ref 1.6–2.4)
MCH RBC QN AUTO: 30.6 PG (ref 26.6–33)
MCHC RBC AUTO-ENTMCNC: 33.9 G/DL (ref 31.5–35.7)
MCV RBC AUTO: 90.2 FL (ref 79–97)
MONOCYTES # BLD AUTO: 0.31 10*3/MM3 (ref 0.1–0.9)
MONOCYTES NFR BLD AUTO: 6 % (ref 5–12)
NEUTROPHILS NFR BLD AUTO: 3.94 10*3/MM3 (ref 1.7–7)
NEUTROPHILS NFR BLD AUTO: 76.8 % (ref 42.7–76)
NITRITE UR QL STRIP: NEGATIVE
NRBC BLD AUTO-RTO: 0 /100 WBC (ref 0–0.2)
PH UR STRIP.AUTO: 6.5 [PH] (ref 5–8)
PLATELET # BLD AUTO: 151 10*3/MM3 (ref 140–450)
PMV BLD AUTO: 10.2 FL (ref 6–12)
POTASSIUM SERPL-SCNC: 4.3 MMOL/L (ref 3.5–5.2)
PROT SERPL-MCNC: 6.8 G/DL (ref 6–8.5)
PROT UR QL STRIP: NEGATIVE
QT INTERVAL: 421 MS
RBC # BLD AUTO: 4.28 10*6/MM3 (ref 4.14–5.8)
RBC # UR STRIP: ABNORMAL /HPF
REF LAB TEST METHOD: ABNORMAL
SODIUM SERPL-SCNC: 134 MMOL/L (ref 136–145)
SP GR UR STRIP: 1.01 (ref 1–1.03)
SQUAMOUS #/AREA URNS HPF: ABNORMAL /HPF
TROPONIN T SERPL HS-MCNC: 22 NG/L
UROBILINOGEN UR QL STRIP: ABNORMAL
WBC # UR STRIP: ABNORMAL /HPF
WBC NRBC COR # BLD: 5.13 10*3/MM3 (ref 3.4–10.8)
WHOLE BLOOD HOLD COAG: NORMAL
WHOLE BLOOD HOLD SPECIMEN: NORMAL

## 2023-04-20 PROCEDURE — 82962 GLUCOSE BLOOD TEST: CPT

## 2023-04-20 PROCEDURE — 93005 ELECTROCARDIOGRAM TRACING: CPT

## 2023-04-20 PROCEDURE — 36415 COLL VENOUS BLD VENIPUNCTURE: CPT

## 2023-04-20 PROCEDURE — 99283 EMERGENCY DEPT VISIT LOW MDM: CPT

## 2023-04-20 PROCEDURE — 93005 ELECTROCARDIOGRAM TRACING: CPT | Performed by: EMERGENCY MEDICINE

## 2023-04-20 PROCEDURE — 81001 URINALYSIS AUTO W/SCOPE: CPT | Performed by: EMERGENCY MEDICINE

## 2023-04-20 PROCEDURE — 85025 COMPLETE CBC W/AUTO DIFF WBC: CPT

## 2023-04-20 PROCEDURE — 84484 ASSAY OF TROPONIN QUANT: CPT

## 2023-04-20 PROCEDURE — 80053 COMPREHEN METABOLIC PANEL: CPT

## 2023-04-20 PROCEDURE — 83735 ASSAY OF MAGNESIUM: CPT

## 2023-04-20 PROCEDURE — 71045 X-RAY EXAM CHEST 1 VIEW: CPT

## 2023-04-20 RX ORDER — SODIUM CHLORIDE 0.9 % (FLUSH) 0.9 %
10 SYRINGE (ML) INJECTION AS NEEDED
Status: DISCONTINUED | OUTPATIENT
Start: 2023-04-20 | End: 2023-04-20 | Stop reason: HOSPADM

## 2023-04-20 RX ORDER — CEFTRIAXONE SODIUM 1 G/50ML
1 INJECTION, SOLUTION INTRAVENOUS ONCE
Status: DISCONTINUED | OUTPATIENT
Start: 2023-04-20 | End: 2023-04-20 | Stop reason: HOSPADM

## 2023-04-20 NOTE — ED PROVIDER NOTES
"Time: 12:44 PM EDT  Date of encounter:  4/20/2023  Independent Historian/Clinical History and Information was obtained by:   Patient  Chief Complaint: Hypertension; Weakness- Generalized    History is limited by: N/A    History of Present Illness:  Patient is a 82 y.o. year old male who presents to the emergency department for evaluation of hypertension and generalized weakness. Patient states his blood pressure has been fluctuating this morning. He states he has taken his blood pressure medication this morning. He states he feels weak and head feels stuffy. He has the shakes as well. He denies chest pain, SOB, cough, fever, numbness, tingling. He claims dysuria before his BP medicine, feels better now. Current BP is 154/67 systolic.    HPI    Patient Care Team  Primary Care Provider: Mary Moya APRN    Past Medical History:     Allergies   Allergen Reactions   • Latex, Natural Rubber Rash   • Pantoprazole Unknown - Low Severity   • Penicillins Dizziness          • Sulfa Antibiotics Other (See Comments)     \"FEEL BAD\"     Past Medical History:   Diagnosis Date   • Diabetes mellitus    • GERD (gastroesophageal reflux disease)    • Hypertension      Past Surgical History:   Procedure Laterality Date   • CARDIAC CATHETERIZATION      stent placed   • CARDIAC CATHETERIZATION N/A 8/11/2022    Procedure: Left Heart Cath;  Surgeon: Allison Lazcano MD;  Location: [facility] INVASIVE LOCATION;  Service: Cardiovascular;  Laterality: N/A;   • CARDIAC CATHETERIZATION N/A 8/11/2022    Procedure: Possible Percutaneous Coronary Intervention;  Surgeon: Allison Lazcano MD;  Location: [facility] CATH INVASIVE LOCATION;  Service: Cardiovascular;  Laterality: N/A;   • CARDIAC SURGERY       History reviewed. No pertinent family history.    Home Medications:  Prior to Admission medications    Medication Sig Start Date End Date Taking? Authorizing Provider   amLODIPine (NORVASC) 2.5 MG tablet Take 1 tablet by mouth " Every 12 (Twelve) Hours. 12/27/22   Zurdo Holloway MD   amLODIPine (NORVASC) 5 MG tablet Take 1 tablet by mouth Daily.    Zurdo Holloway MD   aspirin 81 MG chewable tablet Chew 1 tablet Daily.    Zurdo Hollowya MD   atorvastatin (LIPITOR) 80 MG tablet Take 1 tablet by mouth Daily. 8/12/22   Joseph Le MD   cetirizine (zyrTEC) 10 MG tablet Take 10 mg by mouth Daily. 1/20/22 1/21/23  Zurdo Hollowya MD   clopidogrel (PLAVIX) 75 MG tablet Take 1 tablet by mouth Daily. 8/12/22   Joseph Le MD   diclofenac (VOLTAREN) 75 MG EC tablet Take 1 tablet by mouth Every 12 (Twelve) Hours. 1/19/23   Zurdo Holloway MD   fluticasone (FLONASE) 50 MCG/ACT nasal spray 2 sprays Daily. 2/16/23   Zurdo Holloway MD   glimepiride (AMARYL) 2 MG tablet Take 1 tablet by mouth Daily. 5/27/22   Zurdo Holloway MD   ketoconazole (NIZORAL) 2 % shampoo APPLY TO THE SCALP EVERY DAY IN THE MORNING, LEAVE ON FOR 5 MINUTES AND RINSE 3/9/23   Zurdo Holloway MD   levothyroxine (SYNTHROID, LEVOTHROID) 75 MCG tablet Take 1 tablet by mouth Daily. 4/25/22   Zurdo Holloway MD   lisinopril-hydrochlorothiazide (PRINZIDE,ZESTORETIC) 10-12.5 MG per tablet Take 1 tablet by mouth. 12/8/22   Zurdo Holloway MD   metFORMIN (GLUCOPHAGE) 500 MG tablet Take 1 tablet by mouth 2 (Two) Times a Day With Meals. 2/24/23   Zurdo Holloway MD   metoprolol tartrate (LOPRESSOR) 25 MG tablet Take 1 tablet by mouth Every 12 (Twelve) Hours. 8/11/22   Joseph Le MD   nitroglycerin (NITROSTAT) 0.4 MG SL tablet Take 1 tablet by mouth Every 5 (Five) Minutes As Needed.    Zurdo Holloway MD   nitroglycerin (TRIDIL) 200 mcg/mL infusion Infuse 5-200 mcg/min into a venous catheter Dose Adjusted By Provider As Needed. 8/11/22   Joseph Le MD   omeprazole (priLOSEC) 20 MG capsule Take 1 capsule by mouth Daily. 3/8/23   Provider, MD Zurdo   pantoprazole  "(PROTONIX) 40 MG EC tablet Take 1 tablet by mouth Every Morning. 8/12/22   Joseph Le MD   rosuvastatin (CRESTOR) 20 MG tablet Take 1 tablet by mouth Every Night. 1/6/23   Zurdo Holloway MD sennosides-docusate (PERICOLACE) 8.6-50 MG per tablet Take 1 tablet by mouth Daily.    Zurdo Holloway MD   terbinafine (lamiSIL) 250 MG tablet Take 1 tablet by mouth Daily. 1/19/23   Zurdo Holloway MD        Social History:   Social History     Tobacco Use   • Smoking status: Never     Passive exposure: Never   • Smokeless tobacco: Never   Vaping Use   • Vaping Use: Never used   Substance Use Topics   • Alcohol use: Never   • Drug use: Never         Review of Systems:  Review of Systems   Constitutional: Negative for chills and fever.   HENT: Negative for congestion, rhinorrhea and sore throat.    Eyes: Negative for pain and visual disturbance.   Respiratory: Negative for apnea, cough, chest tightness and shortness of breath.    Cardiovascular: Negative for chest pain and palpitations.   Gastrointestinal: Negative for abdominal pain, diarrhea, nausea and vomiting.   Genitourinary: Positive for dysuria. Negative for difficulty urinating.   Musculoskeletal: Negative for joint swelling and myalgias.   Skin: Negative for color change.   Neurological: Positive for weakness. Negative for seizures and headaches.   Psychiatric/Behavioral: Negative.    All other systems reviewed and are negative.       Physical Exam:  /62   Pulse 74   Temp 97.8 °F (36.6 °C) (Oral)   Resp 19   Ht 172.7 cm (68\")   Wt 80.9 kg (178 lb 5.6 oz)   SpO2 98%   BMI 27.12 kg/m²     Physical Exam  Vitals and nursing note reviewed.   Constitutional:       General: He is not in acute distress.     Appearance: Normal appearance. He is not toxic-appearing.   HENT:      Head: Normocephalic and atraumatic.      Jaw: There is normal jaw occlusion.   Eyes:      General: Lids are normal.      Extraocular Movements: Extraocular " movements intact.      Conjunctiva/sclera: Conjunctivae normal.      Pupils: Pupils are equal, round, and reactive to light.   Cardiovascular:      Rate and Rhythm: Normal rate and regular rhythm.      Pulses: Normal pulses.      Heart sounds: Normal heart sounds.   Pulmonary:      Effort: Pulmonary effort is normal. No respiratory distress.      Breath sounds: Normal breath sounds. No wheezing or rhonchi.   Abdominal:      General: Abdomen is flat.      Palpations: Abdomen is soft.      Tenderness: There is no abdominal tenderness. There is no guarding or rebound.   Musculoskeletal:         General: Normal range of motion.      Cervical back: Normal range of motion and neck supple.      Right lower leg: No edema.      Left lower leg: No edema.   Skin:     General: Skin is warm and dry.   Neurological:      Mental Status: He is alert and oriented to person, place, and time. Mental status is at baseline.   Psychiatric:         Mood and Affect: Mood normal.                  Procedures:  Procedures      Medical Decision Making:      Comorbidities that affect care:    GERD, Diabetes, Hypertension    External Notes reviewed:    Hospital Discharge Summary: Patient was recently admitted for an NSTEMI.      The following orders were placed and all results were independently analyzed by me:  Orders Placed This Encounter   Procedures   • XR Chest 1 View   • [place] Draw   • Comprehensive Metabolic Panel   • Single High Sensitivity Troponin T   • Magnesium   • Urinalysis With Microscopic If Indicated (No Culture) - Urine, Clean Catch   • CBC Auto Differential   • Urinalysis, Microscopic Only - Urine, Clean Catch   • NPO Diet NPO Type: Strict NPO   • Undress & Gown   • Cardiac Monitoring   • Continuous Pulse Oximetry   • Vital Signs   • Orthostatic Blood Pressure   • Oxygen Therapy- Nasal Cannula; 2 LPM; Titrate for SPO2: 92%, Greater Than or Equal To   • POC Glucose Once   • POC Glucose Once   • ECG 12 Lead ED Triage Standing  Order; Weak / Dizzy / AMS   • Insert Peripheral IV   • Fall Precautions   • CBC & Differential   • Green Top (Gel)   • Lavender Top   • Gold Top - SST   • Light Blue Top       Medications Given in the Emergency Department:  Medications   sodium chloride 0.9 % flush 10 mL (has no administration in time range)   cefTRIAXone (ROCEPHIN) IVPB 1 g (has no administration in time range)        ED Course:         Labs:    Lab Results (last 24 hours)     Procedure Component Value Units Date/Time    CBC & Differential [[id number]]  (Abnormal) Collected: 04/20/23 1127    Specimen: Blood Updated: 04/20/23 1139    Narrative:      The following orders were created for panel order CBC & Differential.  Procedure                               Abnormality         Status                     ---------                               -----------         ------                     CBC Auto Differential[[id number]]        Abnormal            Final result                 Please view results for these tests on the individual orders.    Comprehensive Metabolic Panel [[id number]]  (Abnormal) Collected: 04/20/23 1127    Specimen: Blood Updated: 04/20/23 1157     Glucose 185 mg/dL      BUN 25 mg/dL      Creatinine 1.38 mg/dL      Sodium 134 mmol/L      Potassium 4.3 mmol/L      Chloride 97 mmol/L      CO2 26.2 mmol/L      Calcium 9.4 mg/dL      Total Protein 6.8 g/dL      Albumin 4.3 g/dL      ALT (SGPT) 8 U/L      AST (SGOT) 15 U/L      Alkaline Phosphatase 73 U/L      Total Bilirubin 0.4 mg/dL      Globulin 2.5 gm/dL      A/G Ratio 1.7 g/dL      BUN/Creatinine Ratio 18.1     Anion Gap 10.8 mmol/L      eGFR 51.1 mL/min/1.73     Narrative:      GFR Normal >60  Chronic Kidney Disease <60  Kidney Failure <15    The GFR formula is only valid for adults with stable renal function between ages 18 and 70.    Single High Sensitivity Troponin T [[id number]]  (Abnormal) Collected: 04/20/23 1127    Specimen: Blood Updated: 04/20/23 1157     HS Troponin T 22  ng/L     Narrative:      High Sensitive Troponin T Reference Range:  <10.0 ng/L- Negative Female for AMI  <15.0 ng/L- Negative Male for AMI  >=10 - Abnormal Female indicating possible myocardial injury.  >=15 - Abnormal Male indicating possible myocardial injury.   Clinicians would have to utilize clinical acumen, EKG, Troponin, and serial changes to determine if it is an Acute Myocardial Infarction or myocardial injury due to an underlying chronic condition.         Magnesium [[id number]]  (Normal) Collected: 04/20/23 1127    Specimen: Blood Updated: 04/20/23 1157     Magnesium 1.8 mg/dL     CBC Auto Differential [[id number]]  (Abnormal) Collected: 04/20/23 1127    Specimen: Blood Updated: 04/20/23 1139     WBC 5.13 10*3/mm3      RBC 4.28 10*6/mm3      Hemoglobin 13.1 g/dL      Hematocrit 38.6 %      MCV 90.2 fL      MCH 30.6 pg      MCHC 33.9 g/dL      RDW 13.0 %      RDW-SD 42.7 fl      MPV 10.2 fL      Platelets 151 10*3/mm3      Neutrophil % 76.8 %      Lymphocyte % 15.0 %      Monocyte % 6.0 %      Eosinophil % 1.4 %      Basophil % 0.6 %      Immature Grans % 0.2 %      Neutrophils, Absolute 3.94 10*3/mm3      Lymphocytes, Absolute 0.77 10*3/mm3      Monocytes, Absolute 0.31 10*3/mm3      Eosinophils, Absolute 0.07 10*3/mm3      Basophils, Absolute 0.03 10*3/mm3      Immature Grans, Absolute 0.01 10*3/mm3      nRBC 0.0 /100 WBC     Urinalysis With Microscopic If Indicated (No Culture) - Urine, Clean Catch [[id number]]  (Abnormal) Collected: 04/20/23 1230    Specimen: Urine, Clean Catch Updated: 04/20/23 1249     Color, UA Yellow     Appearance, UA Clear     pH, UA 6.5     Specific Gravity, UA 1.013     Glucose, UA Negative     Ketones, UA Negative     Bilirubin, UA Negative     Blood, UA Negative     Protein, UA Negative     Leuk Esterase, UA Small (1+)     Nitrite, UA Negative     Urobilinogen, UA 1.0 E.U./dL    Urinalysis, Microscopic Only - Urine, Clean Catch [[id number]]  (Abnormal) Collected: 04/20/23 1230     Specimen: Urine, Clean Catch Updated: 04/20/23 1249     RBC, UA 0-2 /HPF      WBC, UA 21-30 /HPF      Bacteria, UA 1+ /HPF      Squamous Epithelial Cells, UA None Seen /HPF      Hyaline Casts, UA 0-2 /LPF      Methodology Automated Microscopy    POC Glucose Once [[id number]]  (Abnormal) Collected: 04/20/23 1347    Specimen: Blood Updated: 04/20/23 1356     Glucose 113 mg/dL      Comment: Serial Number: [id number]:  [id number]              Imaging:    XR Chest 1 View    Result Date: 4/20/2023  PROCEDURE: XR CHEST 1 VW  COMPARISON: 08/10/2022  INDICATIONS: Weak/Dizzy/AMS  FINDINGS:  Sternotomy wires overlie the midline.  Coronary stent noted.  No pneumothorax or pleural fluid is evident.  Pulmonary vascularity is within normal limits.  No focal pulmonary consolidations are evident.  Heart size and mediastinal contour appear unchanged.  Aortic vascular calcification is noted.       No acute cardiopulmonary findings.       OMI CHOUDHARY MD       Electronically Signed and Approved By: OMI CHOUDHARY MD on 4/20/2023 at 12:05                 Differential Diagnosis and Discussion:    Metabolic: Differential diagnosis includes but is not limited to hypertension, hyperglycemia, hyperkalemia, hypocalcemia, metabolic acidosis, hypokalemia, hypoglycemia, malnutrition, hypothyroidism, hyperthyroidism, and adrenal insufficiency.     All labs were reviewed and interpreted by me.    MDM  Number of Diagnoses or Management Options  Acute UTI  Diagnosis management comments: The patient´s CBC that was reviewed and interpreted by me shows no abnormalities of critical concern. Of note, there is no anemia requiring a blood transfusion and the platelet count is acceptable.  The patient´s CMP that was reviewed and interpretted by me shows no abnormalities of critical concern. Of note, the patient´s sodium and potassium are acceptable. The patient´s liver enzymes are unremarkable. The patient´s renal function (creatinine) is preserved. The  patient has a normal anion gap.  Urinalysis shows +1 bacteriuria with leukocyte esterase.  Patient was ordered a gram of Rocephin emergency department.  Patient did state to the nurses that he did not want to stay for treatment and has decided to leave.  This patient has left the emergency department or waiting room with no communication to myself, nursing or administrative staff. There was no opportunity to discuss the patient's decision to leave, provide medical advice or discuss alternatives to. The staff has made efforts to locate patient without success.       Amount and/or Complexity of Data Reviewed  Independent visualization of images, tracings, or specimens: yes    Risk of Complications, Morbidity, and/or Mortality  Presenting problems: moderate  Management options: moderate       A urinary tract infection (UTI) is an infection that can affect any part of the urinary system, including the kidneys, bladder, ureters, and urethra. UTIs are usually caused by bacteria that enter the urinary tract through the urethra and can cause a range of uncomfortable and potentially serious symptoms.  The patient was found to have a urinary tract infection.  It does need to be treated and hears the reasoning:    Relief of symptoms: UTIs can cause painful and uncomfortable symptoms, such as a burning sensation during urination, frequent urination, and lower abdominal pain. Treatment with antibiotics can help to alleviate these symptoms and improve overall comfort.    Prevention of complications: If left untreated, a UTI can lead to complications such as kidney damage, sepsis, or even life-threatening infections. Treatment with antibiotics can help prevent the spread of infection and the development of more serious complications.    Prevention of recurrent infections: UTIs are a common and recurrent problem for some individuals, particularly women. Treating a UTI promptly can help to prevent future infections from  occurring.    Public health: The spread of antibiotic-resistant bacteria is a growing concern worldwide. Treating a UTI promptly and appropriately can help to prevent the development of antibiotic resistance and protect public health.    Unfortunately the patient has decided to leave prior to treatment.        Patient Care Considerations:    I considered ordering a CT scan of the abdomen pelvis to ensure that the patient does not have a renal stone.  However the patient has eloped.      Consultants/Shared Management Plan:    None    Social Determinants of Health:    Patient is independent, reliable, and has access to care.       Disposition and Care Coordination:    Eloped: This patient has left the emergency department or waiting room with no communication to myself, nursing or administrative staff. There was no opportunity to discuss the patient's decision to leave, provide medical advice or discuss alternatives to. The staff has made efforts to locate patient without success.        Final diagnoses:   Acute UTI        ED Disposition     ED Disposition   Discharge    Condition   Stable    Comment   --           Documentation assistance provided by Drew Choudhary acting as scribe for Odell Velazco MD. Information recorded by the scribe was done at my direction and has been verified and validated by me.     This medical record created using voice recognition software.           Drew Choudhary  04/20/23 5149       Odell Velazco MD  04/20/23 0938

## 2023-04-25 LAB — GLUCOSE BLDC GLUCOMTR-MCNC: 155 MG/DL (ref 70–99)

## 2023-04-25 RX ORDER — HYDRALAZINE HYDROCHLORIDE 25 MG/1
25 TABLET, FILM COATED ORAL 3 TIMES DAILY
Qty: 45 TABLET | Refills: 0 | COMMUNITY
Start: 2023-04-22 | End: 2023-05-07

## 2023-04-25 RX ORDER — TRIAMCINOLONE ACETONIDE 1 MG/G
CREAM TOPICAL
COMMUNITY
Start: 2023-04-18 | End: 2024-04-18

## 2023-04-25 RX ORDER — FLUOROURACIL 50 MG/G
CREAM TOPICAL
COMMUNITY
Start: 2023-04-16

## 2023-04-25 RX ORDER — LEVOTHYROXINE SODIUM 0.07 MG/1
1 TABLET ORAL
COMMUNITY
Start: 2023-04-23

## 2023-04-26 NOTE — PROGRESS NOTES
"    UROLOGY OFFICE follow-up NOTE    Subjective   HPI  Andrea Odom is a 83 y.o. male.  Presents for management discussion of meatal stenosis.  Previously seen by urology ARNYAMILET, Carlota Leyva who recommended meatotomy.  Last seen 3/2023    He is feeling fine. He states that sometimes when he goes up to the commode, he will not urinate. Once he is done urinating, he will dribble. It is a \"twist stream.\"  He gets up 3 to 4 times a night to urinate.    If he drinks a lot of water or drinks coffee, he will urinate more.  Reports to urinary tract infections so far.     He had a vasectomy years ago.   He is on aspirin and Plavix.     He does not want to do surgery right now.     _________  Previous  states he is with a slow and weak stream.      He reports he has nocturia x 4      Very slow weak stream.      He states the more he strains the less he has come out.      He is circumcised.      He is on no prostate meds.      He states that he does have a splayed and splitting string as well as \"twisting\" stream        Urine cultures:     2/16/2023 Enterococcus faecalis greater than 100,000 colony-forming units per milliliter resistant to levofloxacin     9/29/2022 Enterococcus faecalis greater than 100,000 colony-forming units per milliliter resistant to ciprofloxacin, levofloxacin, tetracycline.     9/2/2022 Enterococcus faecalis greater than 100,000 colony-forming units per milliliter resistant to ciprofloxacin, levofloxacin, tetracycline     8/23/2022 Enterococcus faecalis greater than 100,000 colony-forming units per milliliter resistant to Cipro, Levaquin, tetracycline     Review of systems  A review of systems was performed, and positive findings are noted in the HPI.    Objective     Vital Signs:   Ht 172.7 cm (68\")   Wt 80.1 kg (176 lb 9.6 oz)   BMI 26.85 kg/m²       Physical exam  No acute distress, well-nourished  Awake alert and oriented  Mood normal; affect normal  : Severe meatal stenosis with lumen " approximately 8 Setswana; no other penile lesions    Problem List:  Patient Active Problem List   Diagnosis   • Acute chest pain   • NSTEMI (non-ST elevated myocardial infarction)   • ACS (acute coronary syndrome)   • Anxiety   • Aortic stenosis   • Arteriosclerosis of coronary artery   • B12 deficiency   • Benign essential HTN   • Bilateral carotid artery stenosis   • Carotid artery disease   • Diabetes mellitus type 2 in nonobese   • Dyslipidemia   • Epigastric pain   • GERD (gastroesophageal reflux disease)   • Mixed hyperlipidemia   • Paralysis of glottis   • Presence of aortocoronary bypass graft   • Status post carotid endarterectomy   • Thyroid disease   • Unstable angina   • Vertigo   • Voice hoarseness       Assessment & Plan   Diagnoses and all orders for this visit:    1. Stricture of male urethra, unspecified stricture type (Primary)    2. Benign prostatic hyperplasia with urinary hesitancy  -     doxazosin (Cardura) 2 MG tablet; Take 1 tablet by mouth Daily for 90 days.  Dispense: 90 tablet; Refill: 0      Patient's biggest complaint at today's visit is lower urinary tract symptoms likely associated with BPH exacerbated by his meatal stenosis.  Believe he would benefit from meatotomy.    We discussed the possibility of a meatotomy.  Does not wish to pursue surgical intervention at this time.      We will start him on a low dose of alpha blocker doxazosin 2 mg.  Risks of this medication were discussed with him at length.  Prescription sent to pharmacy    -He will return in 2 months, PVR   all questions and concerns have been addressed at this time.        Transcribed from ambient dictation for Debbie Rosa MD by Meme Joshua.  04/27/23   18:06 EDT    Patient or patient representative verbalized consent to the visit recording.  I have personally performed the services described in this document as transcribed by the above individual, and it is both accurate and complete.

## 2023-04-27 ENCOUNTER — OFFICE VISIT (OUTPATIENT)
Dept: UROLOGY | Facility: CLINIC | Age: 83
End: 2023-04-27
Payer: MEDICARE

## 2023-04-27 VITALS — BODY MASS INDEX: 26.76 KG/M2 | WEIGHT: 176.6 LBS | HEIGHT: 68 IN

## 2023-04-27 DIAGNOSIS — N40.1 BENIGN PROSTATIC HYPERPLASIA WITH URINARY HESITANCY: ICD-10-CM

## 2023-04-27 DIAGNOSIS — N35.919 STRICTURE OF MALE URETHRA, UNSPECIFIED STRICTURE TYPE: Primary | ICD-10-CM

## 2023-04-27 DIAGNOSIS — R39.11 BENIGN PROSTATIC HYPERPLASIA WITH URINARY HESITANCY: ICD-10-CM

## 2023-04-27 RX ORDER — DOXAZOSIN 2 MG/1
2 TABLET ORAL DAILY
Qty: 90 TABLET | Refills: 0 | Status: SHIPPED | OUTPATIENT
Start: 2023-04-27 | End: 2023-07-26

## 2023-04-30 LAB — QT INTERVAL: 421 MS

## 2023-05-01 ENCOUNTER — OFFICE VISIT (OUTPATIENT)
Dept: OTOLARYNGOLOGY | Facility: CLINIC | Age: 83
End: 2023-05-01
Payer: MEDICARE

## 2023-05-01 VITALS — BODY MASS INDEX: 26.85 KG/M2 | HEIGHT: 68 IN

## 2023-05-01 DIAGNOSIS — Z53.21 PATIENT LEFT WITHOUT BEING SEEN: Primary | ICD-10-CM

## 2023-07-10 PROBLEM — R53.1 WEAKNESS: Status: ACTIVE | Noted: 2023-06-16

## 2023-07-10 PROBLEM — N30.01 ACUTE CYSTITIS WITH HEMATURIA: Status: ACTIVE | Noted: 2023-06-16

## 2023-07-10 PROBLEM — R55 NEAR SYNCOPE: Status: ACTIVE | Noted: 2023-01-14

## 2023-07-10 PROBLEM — E87.1 HYPONATREMIA: Status: ACTIVE | Noted: 2023-06-16

## 2023-07-24 ENCOUNTER — PREP FOR SURGERY (OUTPATIENT)
Dept: OTHER | Facility: HOSPITAL | Age: 83
End: 2023-07-24
Payer: MEDICARE

## 2023-07-24 DIAGNOSIS — R39.11 BENIGN PROSTATIC HYPERPLASIA WITH URINARY HESITANCY: ICD-10-CM

## 2023-07-24 DIAGNOSIS — E08.65 DIABETES MELLITUS DUE TO UNDERLYING CONDITION WITH HYPERGLYCEMIA, UNSPECIFIED WHETHER LONG TERM INSULIN USE: ICD-10-CM

## 2023-07-24 DIAGNOSIS — N40.1 BENIGN PROSTATIC HYPERPLASIA WITH URINARY HESITANCY: ICD-10-CM

## 2023-07-24 DIAGNOSIS — N35.919 STRICTURE OF MALE URETHRA, UNSPECIFIED STRICTURE TYPE: Primary | ICD-10-CM

## 2023-07-24 RX ORDER — LEVOFLOXACIN 5 MG/ML
500 INJECTION, SOLUTION INTRAVENOUS ONCE
OUTPATIENT
Start: 2023-07-24 | End: 2023-07-24

## 2023-08-01 ENCOUNTER — TELEPHONE (OUTPATIENT)
Dept: UROLOGY | Facility: CLINIC | Age: 83
End: 2023-08-01
Payer: MEDICARE

## 2023-08-02 ENCOUNTER — TELEPHONE (OUTPATIENT)
Dept: UROLOGY | Facility: CLINIC | Age: 83
End: 2023-08-02
Payer: MEDICARE

## 2023-08-03 ENCOUNTER — TELEPHONE (OUTPATIENT)
Dept: UROLOGY | Facility: CLINIC | Age: 83
End: 2023-08-03
Payer: MEDICARE

## 2023-08-03 PROBLEM — R39.11 BENIGN PROSTATIC HYPERPLASIA WITH URINARY HESITANCY: Status: ACTIVE | Noted: 2023-08-03

## 2023-08-03 PROBLEM — N40.1 BENIGN PROSTATIC HYPERPLASIA WITH URINARY HESITANCY: Status: ACTIVE | Noted: 2023-08-03

## 2023-08-03 PROBLEM — N35.919 STRICTURE OF MALE URETHRA: Status: ACTIVE | Noted: 2023-08-03

## 2023-08-10 ENCOUNTER — TELEPHONE (OUTPATIENT)
Dept: UROLOGY | Facility: CLINIC | Age: 83
End: 2023-08-10
Payer: MEDICARE

## 2023-08-10 NOTE — TELEPHONE ENCOUNTER
Called patient that there is not a sooner time for surgery if there is an opening will let the patient know.  Called and was not able to leave a message because voice mail not being set up.

## 2023-08-18 ENCOUNTER — PRE-ADMISSION TESTING (OUTPATIENT)
Dept: PREADMISSION TESTING | Facility: HOSPITAL | Age: 83
End: 2023-08-18
Payer: MEDICARE

## 2023-08-18 ENCOUNTER — HOSPITAL ENCOUNTER (OUTPATIENT)
Dept: GENERAL RADIOLOGY | Facility: HOSPITAL | Age: 83
Discharge: HOME | End: 2023-08-18
Payer: MEDICARE

## 2023-08-18 ENCOUNTER — TELEPHONE (OUTPATIENT)
Dept: SURGERY | Facility: CLINIC | Age: 83
End: 2023-08-18
Payer: MEDICARE

## 2023-08-18 VITALS
WEIGHT: 171.52 LBS | SYSTOLIC BLOOD PRESSURE: 126 MMHG | HEIGHT: 68 IN | TEMPERATURE: 97.4 F | BODY MASS INDEX: 25.99 KG/M2 | RESPIRATION RATE: 18 BRPM | DIASTOLIC BLOOD PRESSURE: 88 MMHG | OXYGEN SATURATION: 97 % | HEART RATE: 76 BPM

## 2023-08-18 DIAGNOSIS — R39.11 BENIGN PROSTATIC HYPERPLASIA WITH URINARY HESITANCY: ICD-10-CM

## 2023-08-18 DIAGNOSIS — N35.919 STRICTURE OF MALE URETHRA, UNSPECIFIED STRICTURE TYPE: ICD-10-CM

## 2023-08-18 DIAGNOSIS — N40.1 BENIGN PROSTATIC HYPERPLASIA WITH URINARY HESITANCY: ICD-10-CM

## 2023-08-18 DIAGNOSIS — E08.65 DIABETES MELLITUS DUE TO UNDERLYING CONDITION WITH HYPERGLYCEMIA, UNSPECIFIED WHETHER LONG TERM INSULIN USE: ICD-10-CM

## 2023-08-18 DIAGNOSIS — N35.819 OTHER STRICTURE OF URETHRA IN MALE: Primary | ICD-10-CM

## 2023-08-18 LAB
ABO GROUP BLD: NORMAL
BACTERIA UR QL AUTO: ABNORMAL /HPF
BILIRUB UR QL STRIP: NEGATIVE
CLARITY UR: ABNORMAL
COLOR UR: YELLOW
GLUCOSE UR STRIP-MCNC: NEGATIVE MG/DL
HBA1C MFR BLD: 5.5 % (ref 4.8–5.6)
HGB UR QL STRIP.AUTO: ABNORMAL
HYALINE CASTS UR QL AUTO: ABNORMAL /LPF
KETONES UR QL STRIP: NEGATIVE
LEUKOCYTE ESTERASE UR QL STRIP.AUTO: ABNORMAL
NITRITE UR QL STRIP: NEGATIVE
PH UR STRIP.AUTO: 6 [PH] (ref 5–8)
PROT UR QL STRIP: ABNORMAL
RBC # UR STRIP: ABNORMAL /HPF
REF LAB TEST METHOD: ABNORMAL
RH BLD: POSITIVE
SP GR UR STRIP: 1.01 (ref 1–1.03)
SQUAMOUS #/AREA URNS HPF: ABNORMAL /HPF
UROBILINOGEN UR QL STRIP: ABNORMAL
WBC # UR STRIP: ABNORMAL /HPF

## 2023-08-18 PROCEDURE — 83036 HEMOGLOBIN GLYCOSYLATED A1C: CPT

## 2023-08-18 PROCEDURE — 71046 X-RAY EXAM CHEST 2 VIEWS: CPT

## 2023-08-18 PROCEDURE — 87086 URINE CULTURE/COLONY COUNT: CPT

## 2023-08-18 PROCEDURE — 81001 URINALYSIS AUTO W/SCOPE: CPT

## 2023-08-18 PROCEDURE — 36415 COLL VENOUS BLD VENIPUNCTURE: CPT

## 2023-08-18 PROCEDURE — 87186 SC STD MICRODIL/AGAR DIL: CPT

## 2023-08-18 PROCEDURE — 87077 CULTURE AEROBIC IDENTIFY: CPT

## 2023-08-18 NOTE — TELEPHONE ENCOUNTER
Left message for RANULFO Kaye. Advised to just go ahead and get all the labs just to be on the safe side. Asked for a call back with any questions.

## 2023-08-18 NOTE — TELEPHONE ENCOUNTER
Mikki called back and she had the patient leave a urine sample, results are in chart. Sent for culture. She wanted to make sure Dr. Rosa saw the results.

## 2023-08-18 NOTE — PAT
SPOKE WITH FLETCHER AT [facility] AND LEFT MESSAGE REGARDING ABNORMAL UA  SHOWING 1+ BACTERIA, WBC TNTC,  RBC 6-12 AND HAS BEEN SENT FOR CULTURE SHE REPORTED WOULD GIVE MESSAGE TO DR SINHA NURSE

## 2023-08-18 NOTE — DISCHARGE INSTRUCTIONS
IMPORTANT INSTRUCTIONS - PRE-ADMISSION TESTING  DO NOT EAT OR CHEW anything after midnight the night before your procedure.    You may have CLEAR liquids up to ___2___ hours prior to ARRIVAL time.   Take the following medications the morning of your procedure with JUST A SIP OF WATER:  ___  AMLODIPINE, FLUTICASONE, LEVOTHYROXINE, MAGNESIUM, NITRO IF NEEDED, OMEPRAZOLE____________________________________________________________________________________________________________________________________________________________________________________    DO NOT BRING your medications to the hospital with you, UNLESS something has changed since your PRE-Admission Testing appointment.  STARTING NOW Hold all vitamins, supplements, and NSAIDS (Non- steroidal anti-inflammatory meds) for one week prior to surgery (you MAY take Tylenol or Acetaminophen).  If you are diabetic, check your blood sugar the morning of your procedure. If it is less than 70 or if you are feeling symptomatic, call the following number for further instructions: [phone]_.  Use your inhalers/nebulizers as usual, the morning of your procedure. BRING YOUR INHALERS with you.   Bring your CPAP or BIPAP to hospital, ONLY IF YOU WILL BE SPENDING THE NIGHT.   Make sure you have a ride home and have someone who will stay with you the day of your procedure after you go home.  If you have any questions, please call your Pre-Admission Testing Nurse, __DAWARLETH______________ at [phone] _[phone]___________.   Per anesthesia request, do not smoke for 24 hours before your procedure or as instructed by your surgeon.    BATHING INSTRUCTIONS GIVEN. NO JEWELRY DAY OF PROCEDURE. REFER TO BATHING SHEET  REFER TO CLEAR LIQUID DIET SHEET FOR APPROVED CLEAR LIQUIDS  ON 8/24/23 NO METFORMIN AFTER 6 PM  LAST DOSE OF PLAVIX AND ASA TO BE 8/19/23  WILL CALL 8/24/23 AND GIVE OFFICIAL ARRIVAL TIME FOR DAY OF SURGERY. YOU WILL COME TO SAME AREA HAD PAT APPT. ENTRANCE A ELEVATOR A  3RD FLOOR DAY OF SURGERY

## 2023-08-18 NOTE — PAT
SPOKE WITH DR BHARATH HILTON TO USE CBC, CMP, EKG DONE AT [facility] ON 8/14/23. PT REPORTED HE THOUGHT HE HAD URINARY TRACT INFECTION AND PER HER INSTRUCTION ANOTHER UA DONE AT [facility] PER HER ORDER.

## 2023-08-18 NOTE — TELEPHONE ENCOUNTER
Mikki in PAT called asking if Dr Rodríguez would want her to draw labs and do a UA again?   Pt went to the ER at [facility] on 8-14 and had those things done. She said the UA showed a trace of bacteria and he just finished a round of antibiotics-per notes from ER. She has not seen pt yet, he will be in today at 2pm. Ext: [phone]

## 2023-08-19 ENCOUNTER — HOSPITAL ENCOUNTER (EMERGENCY)
Facility: HOSPITAL | Age: 83
Discharge: HOME | End: 2023-08-20
Attending: EMERGENCY MEDICINE
Payer: MEDICARE

## 2023-08-19 ENCOUNTER — APPOINTMENT (OUTPATIENT)
Dept: GENERAL RADIOLOGY | Facility: HOSPITAL | Age: 83
End: 2023-08-19
Payer: MEDICARE

## 2023-08-19 DIAGNOSIS — N39.0 ACUTE UTI: Primary | ICD-10-CM

## 2023-08-19 LAB
BASOPHILS # BLD AUTO: 0.04 10*3/MM3 (ref 0–0.2)
BASOPHILS NFR BLD AUTO: 0.6 % (ref 0–1.5)
DEPRECATED RDW RBC AUTO: 42.7 FL (ref 37–54)
EOSINOPHIL # BLD AUTO: 0.2 10*3/MM3 (ref 0–0.4)
EOSINOPHIL NFR BLD AUTO: 2.9 % (ref 0.3–6.2)
ERYTHROCYTE [DISTWIDTH] IN BLOOD BY AUTOMATED COUNT: 13 % (ref 12.3–15.4)
HCT VFR BLD AUTO: 37.7 % (ref 37.5–51)
HGB BLD-MCNC: 13.1 G/DL (ref 13–17.7)
IMM GRANULOCYTES # BLD AUTO: 0.02 10*3/MM3 (ref 0–0.05)
IMM GRANULOCYTES NFR BLD AUTO: 0.3 % (ref 0–0.5)
LYMPHOCYTES # BLD AUTO: 1.47 10*3/MM3 (ref 0.7–3.1)
LYMPHOCYTES NFR BLD AUTO: 21.2 % (ref 19.6–45.3)
MCH RBC QN AUTO: 31.1 PG (ref 26.6–33)
MCHC RBC AUTO-ENTMCNC: 34.7 G/DL (ref 31.5–35.7)
MCV RBC AUTO: 89.5 FL (ref 79–97)
MONOCYTES # BLD AUTO: 0.48 10*3/MM3 (ref 0.1–0.9)
MONOCYTES NFR BLD AUTO: 6.9 % (ref 5–12)
NEUTROPHILS NFR BLD AUTO: 4.71 10*3/MM3 (ref 1.7–7)
NEUTROPHILS NFR BLD AUTO: 68.1 % (ref 42.7–76)
NRBC BLD AUTO-RTO: 0 /100 WBC (ref 0–0.2)
PLATELET # BLD AUTO: 181 10*3/MM3 (ref 140–450)
PMV BLD AUTO: 10.1 FL (ref 6–12)
RBC # BLD AUTO: 4.21 10*6/MM3 (ref 4.14–5.8)
WBC NRBC COR # BLD: 6.92 10*3/MM3 (ref 3.4–10.8)

## 2023-08-19 PROCEDURE — 81001 URINALYSIS AUTO W/SCOPE: CPT | Performed by: EMERGENCY MEDICINE

## 2023-08-19 PROCEDURE — 80053 COMPREHEN METABOLIC PANEL: CPT | Performed by: EMERGENCY MEDICINE

## 2023-08-19 PROCEDURE — 96372 THER/PROPH/DIAG INJ SC/IM: CPT

## 2023-08-19 PROCEDURE — 85025 COMPLETE CBC W/AUTO DIFF WBC: CPT | Performed by: EMERGENCY MEDICINE

## 2023-08-19 PROCEDURE — 99284 EMERGENCY DEPT VISIT MOD MDM: CPT

## 2023-08-19 PROCEDURE — 71045 X-RAY EXAM CHEST 1 VIEW: CPT

## 2023-08-19 PROCEDURE — 84484 ASSAY OF TROPONIN QUANT: CPT | Performed by: EMERGENCY MEDICINE

## 2023-08-20 ENCOUNTER — APPOINTMENT (OUTPATIENT)
Dept: CT IMAGING | Facility: HOSPITAL | Age: 83
End: 2023-08-20
Payer: MEDICARE

## 2023-08-20 VITALS
RESPIRATION RATE: 13 BRPM | WEIGHT: 171.52 LBS | OXYGEN SATURATION: 98 % | HEART RATE: 87 BPM | TEMPERATURE: 97.8 F | BODY MASS INDEX: 25.4 KG/M2 | SYSTOLIC BLOOD PRESSURE: 141 MMHG | HEIGHT: 69 IN | DIASTOLIC BLOOD PRESSURE: 74 MMHG

## 2023-08-20 LAB
ALBUMIN SERPL-MCNC: 4.6 G/DL (ref 3.5–5.2)
ALBUMIN/GLOB SERPL: 1.9 G/DL
ALP SERPL-CCNC: 81 U/L (ref 39–117)
ALT SERPL W P-5'-P-CCNC: 9 U/L (ref 1–41)
ANION GAP SERPL CALCULATED.3IONS-SCNC: 12.9 MMOL/L (ref 5–15)
AST SERPL-CCNC: 15 U/L (ref 1–40)
BACTERIA SPEC AEROBE CULT: ABNORMAL
BACTERIA SPEC AEROBE CULT: ABNORMAL
BACTERIA UR QL AUTO: ABNORMAL /HPF
BILIRUB SERPL-MCNC: 0.6 MG/DL (ref 0–1.2)
BILIRUB UR QL STRIP: NEGATIVE
BUN SERPL-MCNC: 20 MG/DL (ref 8–23)
BUN/CREAT SERPL: 16.1 (ref 7–25)
CALCIUM SPEC-SCNC: 9.7 MG/DL (ref 8.6–10.5)
CHLORIDE SERPL-SCNC: 93 MMOL/L (ref 98–107)
CLARITY UR: CLEAR
CO2 SERPL-SCNC: 23.1 MMOL/L (ref 22–29)
COLOR UR: YELLOW
CREAT SERPL-MCNC: 1.24 MG/DL (ref 0.76–1.27)
EGFRCR SERPLBLD CKD-EPI 2021: 57.7 ML/MIN/1.73
GLOBULIN UR ELPH-MCNC: 2.4 GM/DL
GLUCOSE SERPL-MCNC: 108 MG/DL (ref 65–99)
GLUCOSE UR STRIP-MCNC: NEGATIVE MG/DL
HGB UR QL STRIP.AUTO: ABNORMAL
HYALINE CASTS UR QL AUTO: ABNORMAL /LPF
KETONES UR QL STRIP: NEGATIVE
LEUKOCYTE ESTERASE UR QL STRIP.AUTO: ABNORMAL
NITRITE UR QL STRIP: NEGATIVE
PH UR STRIP.AUTO: 6.5 [PH] (ref 5–8)
POTASSIUM SERPL-SCNC: 4.1 MMOL/L (ref 3.5–5.2)
PROT SERPL-MCNC: 7 G/DL (ref 6–8.5)
PROT UR QL STRIP: ABNORMAL
RBC # UR STRIP: ABNORMAL /HPF
REF LAB TEST METHOD: ABNORMAL
SODIUM SERPL-SCNC: 129 MMOL/L (ref 136–145)
SP GR UR STRIP: 1.01 (ref 1–1.03)
SQUAMOUS #/AREA URNS HPF: ABNORMAL /HPF
TROPONIN T SERPL HS-MCNC: 23 NG/L
UROBILINOGEN UR QL STRIP: ABNORMAL
WBC # UR STRIP: ABNORMAL /HPF

## 2023-08-20 PROCEDURE — 0 LIDOCAINE 1 % SOLUTION 10 ML VIAL: Performed by: EMERGENCY MEDICINE

## 2023-08-20 PROCEDURE — 25010000002 CEFTRIAXONE PER 250 MG: Performed by: EMERGENCY MEDICINE

## 2023-08-20 PROCEDURE — 96372 THER/PROPH/DIAG INJ SC/IM: CPT

## 2023-08-20 RX ORDER — CEPHALEXIN 500 MG/1
500 CAPSULE ORAL 4 TIMES DAILY
Qty: 28 CAPSULE | Refills: 0 | Status: ON HOLD | OUTPATIENT
Start: 2023-08-20

## 2023-08-20 RX ORDER — CEFTRIAXONE SODIUM 1 G/50ML
1000 INJECTION, SOLUTION INTRAVENOUS ONCE
Status: DISCONTINUED | OUTPATIENT
Start: 2023-08-20 | End: 2023-08-20

## 2023-08-20 RX ADMIN — LIDOCAINE HYDROCHLORIDE 1 G: 10 INJECTION, SOLUTION INFILTRATION; PERINEURAL at 01:27

## 2023-08-20 NOTE — ED PROVIDER NOTES
"Time: 12:35 AM EDT  Date of encounter:  8/19/2023  Independent Historian/Clinical History and Information was obtained by:   Patient    History is limited by: N/A    Chief Complaint: Hypertension      History of Present Illness:  Patient is a 83 y.o. year old male who presents to the emergency department for evaluation of hypertension.  The patient reports that his systolic blood pressure was greater than 200 prior to arrival.  Patient denies chest pain or shortness of breath.  Patient has no nausea or vomiting.  He states that he had head fullness without overt headache.  Patient also denies subjective neurological deficit including numbness or tingling.  Patient denies visual changes.  Patient also reports feeling very flushed.    HPI    Patient Care Team  Primary Care Provider: Connie Knapp MD    Past Medical History:     Allergies   Allergen Reactions    Latex, Natural Rubber Rash    Pantoprazole Other (See Comments)     \"MADE ME FEEL BAD\"    Penicillins Dizziness           Sulfa Antibiotics Other (See Comments)     \"FEEL BAD\"     Past Medical History:   Diagnosis Date    Anxiety     Arthritis     BPH (benign prostatic hyperplasia)     Coronary artery disease     HAD STENTS PLACED. FOLLOWED BY DR HANSON. DECREASED ACTIVITY USES CANE    Diabetes mellitus     Frequent UTI     GERD (gastroesophageal reflux disease)     Hyperlipidemia     Hypertension     Old MI (myocardial infarction)     SAID THINKS ABOUT 4-5 YEARS AGO    Stricture of male urethra     Stroke     SAYS ABOUT 4-5 YEARS AGO NO RESIDUAL     Past Surgical History:   Procedure Laterality Date    CARDIAC CATHETERIZATION      stent placed    CARDIAC CATHETERIZATION N/A 08/11/2022    Procedure: Left Heart Cath;  Surgeon: Allison Lazcano MD;  Location: [facility] INVASIVE LOCATION;  Service: Cardiovascular;  Laterality: N/A;    CARDIAC CATHETERIZATION N/A 08/11/2022    Procedure: Possible Percutaneous Coronary Intervention;  Surgeon: " Allison Lazcano MD;  Location: [facility] INVASIVE LOC[facility];  Service: Cardiovascular;  Laterality: N/A;    CARDIAC SURGERY      2 VESSEL CABG SAYS AROUND 35-40    SHOULDER SURGERY      RCR UNSURE OF SIDE     History reviewed. No pertinent family history.    Home Medications:  Prior to Admission medications    Medication Sig Start Date End Date Taking? Authorizing Provider   amLODIPine (NORVASC) 5 MG tablet Take 1 tablet by mouth Daily.   Yes Zurdo Holloway MD   aspirin 81 MG chewable tablet Chew 1 tablet Daily. LAST DOSE TO BE 8/19/23 PER DR VELASQUEZ INSTRUCTIONS   Yes Zurdo Holloway MD   clopidogrel (PLAVIX) 75 MG tablet Take 1 tablet by mouth Daily.  Patient taking differently: Take 1 tablet by mouth Daily. LAST DOSE TO BE 8/19/23 PER DR HANSON INSTRUCTIONS 8/12/22  Yes Joseph Le MD   cyanocobalamin (VITAMIN B-12) 500 MCG tablet Take 0.5 tablets by mouth 2 (Two) Times a Day. TAKES HALF A TABLET BID   Yes Zurdo Holloway MD   ECHINACEA PO Take 1 tablet by mouth 2 (Two) Times a Day.   Yes Zurdo Holloway MD   glimepiride (AMARYL) 2 MG tablet Take 1 tablet by mouth Daily. 5/27/22  Yes Zurdo Holloway MD   levothyroxine (SYNTHROID, LEVOTHROID) 75 MCG tablet Take 1 tablet by mouth Before Breakfast. 4/23/23  Yes Zurdo Holloway MD   lisinopril-hydrochlorothiazide (PRINZIDE,ZESTORETIC) 10-12.5 MG per tablet Take 1 tablet by mouth. 12/8/22  Yes Zurdo Holloway MD   metFORMIN (GLUCOPHAGE) 500 MG tablet Take 1 tablet by mouth 2 (Two) Times a Day With Meals. 2/24/23  Yes Zurdo Holloway MD   omeprazole (priLOSEC) 20 MG capsule Take 1 capsule by mouth Daily. 3/8/23  Yes Zurdo Holloway MD   rosuvastatin (CRESTOR) 20 MG tablet Take 1 tablet by mouth Every Night. 1/6/23  Yes Zurdo Holloway MD   fluorouracil (EFUDEX) 5 % cream  4/16/23   Zurdo Holloway MD   fluticasone (FLONASE) 50 MCG/ACT nasal spray 2 sprays Daily. 2/16/23    "Zurdo Holloway MD   ketoconazole (NIZORAL) 2 % shampoo APPLY TO THE SCALP EVERY DAY IN THE MORNING, LEAVE ON FOR 5 MINUTES AND RINSE 3/9/23   Zurdo Holloway MD   Magnesium Oxide -Mg Supplement 400 (240 Mg) MG tablet Take 1 tablet by mouth Daily. 7/6/23 7/6/24  Zurdo Holloway MD   nitroglycerin (NITROSTAT) 0.4 MG SL tablet Take 1 tablet by mouth Every 5 (Five) Minutes As Needed.    Zurdo Holloway MD   triamcinolone (KENALOG) 0.1 % cream Apply  topically to the appropriate area as directed. 4/18/23 4/18/24  Zurdo Holloway MD        Social History:   Social History     Tobacco Use    Smoking status: Never     Passive exposure: Never    Smokeless tobacco: Never   Vaping Use    Vaping Use: Never used   Substance Use Topics    Alcohol use: Never    Drug use: Never         Review of Systems:  Review of Systems   Constitutional:  Negative for chills and fever.   HENT:  Negative for congestion, rhinorrhea and sore throat.    Eyes:  Negative for pain and visual disturbance.   Respiratory:  Negative for apnea, cough, chest tightness and shortness of breath.    Cardiovascular:  Negative for chest pain and palpitations.   Gastrointestinal:  Negative for abdominal pain, diarrhea, nausea and vomiting.   Genitourinary:  Negative for difficulty urinating and dysuria.   Musculoskeletal:  Negative for joint swelling and myalgias.   Skin:  Negative for color change.   Neurological:  Negative for seizures and headaches.   Psychiatric/Behavioral: Negative.     All other systems reviewed and are negative.     Physical Exam:  /65 (BP Location: Left arm, Patient Position: Sitting)   Pulse 82   Temp 97.8 øF (36.6 øC) (Oral)   Resp 13   Ht 174 cm (68.5\")   Wt 77.8 kg (171 lb 8.3 oz)   SpO2 98%   BMI 25.70 kg/mý     Physical Exam  Vitals and nursing note reviewed.   Constitutional:       General: He is not in acute distress.     Appearance: Normal appearance. He is not toxic-appearing.   HENT:    "   Head: Normocephalic and atraumatic.      Jaw: There is normal jaw occlusion.   Eyes:      General: Lids are normal.      Extraocular Movements: Extraocular movements intact.      Conjunctiva/sclera: Conjunctivae normal.      Pupils: Pupils are equal, round, and reactive to light.   Cardiovascular:      Rate and Rhythm: Normal rate and regular rhythm.      Pulses: Normal pulses.      Heart sounds: Normal heart sounds.   Pulmonary:      Effort: Pulmonary effort is normal. No respiratory distress.      Breath sounds: Normal breath sounds. No wheezing or rhonchi.   Abdominal:      General: Abdomen is flat.      Palpations: Abdomen is soft.      Tenderness: There is no abdominal tenderness. There is no guarding or rebound.   Musculoskeletal:         General: Normal range of motion.      Cervical back: Normal range of motion and neck supple.      Right lower leg: No edema.      Left lower leg: No edema.   Skin:     General: Skin is warm and dry.   Neurological:      Mental Status: He is alert and oriented to person, place, and time. Mental status is at baseline.   Psychiatric:         Mood and Affect: Mood normal.              Procedures:  Procedures      Medical Decision Making:      Comorbidities that affect care:    Hypertension    External Notes reviewed:    Previous Clinic Note: Patient has seen his urologist for UTI.      The following orders were placed and all results were independently analyzed by me:  Orders Placed This Encounter   Procedures    CT Head Without Contrast    XR Chest 1 View    Urinalysis With Microscopic If Indicated (No Culture) - Urine, Clean Catch    Comprehensive Metabolic Panel    Single High Sensitivity Troponin T    CBC Auto Differential    Urinalysis, Microscopic Only - Urine, Clean Catch    ECG 12 Lead Chest Pain    CBC & Differential       Medications Given in the Emergency Department:  Medications   cefTRIAXone (ROCEPHIN) IVPB 1,000 mg (has no administration in time range)        ED  Course:         Labs:    Lab Results (last 24 hours)       Procedure Component Value Units Date/Time    POCT URINALYSIS DIPSTICK, AUTOMATED [[id number]]  (Abnormal) Collected: 08/19/23 1507     Updated: 08/19/23 2120    CBC & Differential [[id number]]  (Normal) Collected: 08/19/23 2350    Specimen: Blood Updated: 08/19/23 2358    Narrative:      The following orders were created for panel order CBC & Differential.  Procedure                               Abnormality         Status                     ---------                               -----------         ------                     CBC Auto Differential[[id number]]        Normal              Final result                 Please view results for these tests on the individual orders.    Comprehensive Metabolic Panel [[id number]]  (Abnormal) Collected: 08/19/23 2350    Specimen: Blood Updated: 08/20/23 0023     Glucose 108 mg/dL      BUN 20 mg/dL      Creatinine 1.24 mg/dL      Sodium 129 mmol/L      Potassium 4.1 mmol/L      Chloride 93 mmol/L      CO2 23.1 mmol/L      Calcium 9.7 mg/dL      Total Protein 7.0 g/dL      Albumin 4.6 g/dL      ALT (SGPT) 9 U/L      AST (SGOT) 15 U/L      Alkaline Phosphatase 81 U/L      Total Bilirubin 0.6 mg/dL      Globulin 2.4 gm/dL      A/G Ratio 1.9 g/dL      BUN/Creatinine Ratio 16.1     Anion Gap 12.9 mmol/L      eGFR 57.7 mL/min/1.73     Narrative:      GFR Normal >60  Chronic Kidney Disease <60  Kidney Failure <15    The GFR formula is only valid for adults with stable renal function between ages 18 and 70.    Single High Sensitivity Troponin T [[id number]]  (Abnormal) Collected: 08/19/23 2350    Specimen: Blood Updated: 08/20/23 0023     HS Troponin T 23 ng/L     Narrative:      High Sensitive Troponin T Reference Range:  <10.0 ng/L- Negative Female for AMI  <15.0 ng/L- Negative Male for AMI  >=10 - Abnormal Female indicating possible myocardial injury.  >=15 - Abnormal Male indicating possible myocardial injury.   Clinicians  would have to utilize clinical acumen, EKG, Troponin, and serial changes to determine if it is an Acute Myocardial Infarction or myocardial injury due to an underlying chronic condition.         CBC Auto Differential [[id number]]  (Normal) Collected: 08/19/23 2350    Specimen: Blood Updated: 08/19/23 2358     WBC 6.92 10*3/mm3      RBC 4.21 10*6/mm3      Hemoglobin 13.1 g/dL      Hematocrit 37.7 %      MCV 89.5 fL      MCH 31.1 pg      MCHC 34.7 g/dL      RDW 13.0 %      RDW-SD 42.7 fl      MPV 10.1 fL      Platelets 181 10*3/mm3      Neutrophil % 68.1 %      Lymphocyte % 21.2 %      Monocyte % 6.9 %      Eosinophil % 2.9 %      Basophil % 0.6 %      Immature Grans % 0.3 %      Neutrophils, Absolute 4.71 10*3/mm3      Lymphocytes, Absolute 1.47 10*3/mm3      Monocytes, Absolute 0.48 10*3/mm3      Eosinophils, Absolute 0.20 10*3/mm3      Basophils, Absolute 0.04 10*3/mm3      Immature Grans, Absolute 0.02 10*3/mm3      nRBC 0.0 /100 WBC     Urinalysis With Microscopic If Indicated (No Culture) - Urine, Clean Catch [[id number]]  (Abnormal) Collected: 08/19/23 2351    Specimen: Urine, Clean Catch Updated: 08/20/23 0001     Color, UA Yellow     Appearance, UA Clear     pH, UA 6.5     Specific Gravity, UA 1.014     Glucose, UA Negative     Ketones, UA Negative     Bilirubin, UA Negative     Blood, UA Trace     Protein, UA Trace     Leuk Esterase, UA Moderate (2+)     Nitrite, UA Negative     Urobilinogen, UA 1.0 E.U./dL    Urinalysis, Microscopic Only - Urine, Clean Catch [[id number]]  (Abnormal) Collected: 08/19/23 2351    Specimen: Urine, Clean Catch Updated: 08/20/23 0001     RBC, UA 6-12 /HPF      WBC, UA Too Numerous to Count /HPF      Bacteria, UA None Seen /HPF      Squamous Epithelial Cells, UA None Seen /HPF      Hyaline Casts, UA 13-20 /LPF      Methodology Automated Microscopy             Imaging:    XR Chest 1 View    Result Date: 8/19/2023  PROCEDURE: XR CHEST 1 VW  COMPARISON: [facility], CR, XR  CHEST 2 VW, 8/18/2023, 15:22.  INDICATIONS: COUGH, HYPERTENSION  FINDINGS:  LUNGS: Normal.  No significant pulmonary parenchymal abnormalities.  VASCULATURE: Normal.  Unremarkable pulmonary vasculature.  CARDIAC: Normal.  No cardiac silhouette abnormality or cardiomegaly.  MEDIASTINUM: Normal.  No visible mass or adenopathy.  PLEURA: Normal.  No effusion or pleural thickening.  BONES: Normal.  No fracture or visible bony lesion.  OTHER: Median sternotomy wires appear intact.       No acute cardiopulmonary process identified.       MILEY BONDS MD       Electronically Signed and Approved By: MILEY BONDS MD on 8/19/2023 at 22:38                Differential Diagnosis and Discussion:    Metabolic: Differential diagnosis includes but is not limited to hypertension, hyperglycemia, hyperkalemia, hypocalcemia, metabolic acidosis, hypokalemia, hypoglycemia, malnutrition, hypothyroidism, hyperthyroidism, and adrenal insufficiency.     All labs were reviewed and interpreted by me.    MDM     Amount and/or Complexity of Data Reviewed  Clinical lab tests: reviewed  Tests in the radiology section of CPTr: reviewed       The patient presents to the ED with hypertension. The patient was placed on a monitor in the ED. The blood pressure is much improved with ED treatment. The patient denies chest pain.  Patient was found to have a urinary tract infection.  The patient has a normal neurological exam and has mental status at baseline. Upon re-examination, the patient has no signs or symptoms of acute end organ damage.  The patient was advised of the importance of medical compliance with an emphasis in awareness of their daily sodium intake. The patient was counseled that elevated blood pressure is detrimental to their heart, eyes, kidneys, and may lead to a stroke. The patient was advised to check their blood pressure regularly and follow up as an outpatient regarding this matter. The patient was counseled to return to the ED  for sudden or severe headache, numbness or tingling on one side of the body, facial droop, difficulty speaking, chest pain, or shortness of breath. The patient's condition is stable and appropriate for discharge. The patient will pursue further outpatient evaluation with the primary care physician or other designated or consulting physician as indicated in the discharge instructions.        Patient Care Considerations:    PERC: I used the PERC score to risk stratify the patient for PE and a CT of the chest was considered but ultimately not indicated in today's visit.      Consultants/Shared Management Plan:    None    Social Determinants of Health:    Patient is independent, reliable, and has access to care.       Disposition and Care Coordination:    Discharged: I considered escalation of care by admitting this patient for observation, however the patient has improved and is suitable and  stable for discharge.    I have explained the patient's condition, diagnoses and treatment plan based on the information available to me at this time. I have answered questions and addressed any concerns. The patient has a good  understanding of the patient's diagnosis, condition, and treatment plan as can be expected at this point. The vital signs have been stable. The patient's condition is stable and appropriate for discharge from the emergency department.      The patient will pursue further outpatient evaluation with the primary care physician or other designated or consulting physician as outlined in the discharge instructions. They are agreeable to this plan of care and follow-up instructions have been explained in detail. The patient has received these instructions in written format and have expressed an understanding of the discharge instructions. The patient is aware that any significant change in condition or worsening of symptoms should prompt an immediate return to this or the closest emergency department or call to  271.  I have explained discharge medications and the need for follow up with the patient/caretakers. This was also printed in the discharge instructions. Patient was discharged with the following medications and follow up:      Medication List        New Prescriptions      cephalexin 500 MG capsule  Commonly known as: KEFLEX  Take 1 capsule by mouth 4 (Four) Times a Day.            Changed      clopidogrel 75 MG tablet  Commonly known as: PLAVIX  Take 1 tablet by mouth Daily.  What changed: additional instructions               Where to Get Your Medications        These medications were sent to [facility]/pharmacy #[id number] - [place], [place] - [address] - [phone] [facility] [phone] [facility]  [address] [place] [address]      Hours: 24-hours Phone: [phone]   cephalexin 500 MG capsule      Connie Knapp MD  [address] [place] [id number] [phone]             Final diagnoses:   Acute UTI        ED Disposition       ED Disposition   Discharge    Condition   Stable    Comment   --               This medical record created using voice recognition software.             Odell Velazco MD  08/20/23 0043

## 2023-08-21 ENCOUNTER — TELEPHONE (OUTPATIENT)
Dept: UROLOGY | Facility: CLINIC | Age: 83
End: 2023-08-21
Payer: MEDICARE

## 2023-08-21 DIAGNOSIS — N39.0 URINARY TRACT INFECTION WITHOUT HEMATURIA, SITE UNSPECIFIED: Primary | ICD-10-CM

## 2023-08-21 RX ORDER — AMPICILLIN 500 MG/1
500 CAPSULE ORAL 4 TIMES DAILY
Qty: 28 CAPSULE | Refills: 0 | Status: ON HOLD | OUTPATIENT
Start: 2023-08-21 | End: 2023-08-28

## 2023-08-21 NOTE — TELEPHONE ENCOUNTER
PATIENT CALLED AND WANTS TO KNOW WHAT TIME HIS SURGERY IS GOING TO BE, BECAUSE IF IT IS EARLY IN THE MORNING, HE WILL NOT BE ABLE TO MAKE IT.    HE SAID THE PAPER HE WAS GIVEN SAYS THAT HE NEEDS TO HAVE SOMEONE TO STAY WITH HIM OVERNIGHT.  HE SAID HE DOESN'T HAVE ANYONE TO STAY WITH HIM, AND HE DOESN'T HAVE ENOUGH ROOM.    HE WANTS TO KNOW IF HE CAN STAY OVERNIGHT AT THE HOSPITAL.

## 2023-08-21 NOTE — PAT
SPOKE WITH AUGUSTO AT DR SINHA OFFICE AND MESSAGE LEFT REGARDING PT WAS PLACED ON KEFLEX 500 MG QID HAD BEEN TO URGENT CARE AND ALSO THE ER FOR HTN OVER THE WEEKEND. ADVISED AUGUSTO SODIUM  AND HS TROPONIN SLIGHTLY ELEVATED. ER NOTES ARE IN EPIC TO REVIEW IF NEEDED

## 2023-08-21 NOTE — TELEPHONE ENCOUNTER
I reviewed his chart, labs, and ER note as well as EMS note.    I reviewed his urine culture and sensitivities.    He needs to stop the Keflex and  the ampicillin I sent to his [facility] pharmacy.  It is also 4 times a day.  He needs to take this until anticipated OR on Friday.    Per chart review, appears some of these medical issues are chronic and believe it okay to proceed as planned.  If further medical issues, will need medical clearance and postponement of OR.  Thank you      -Per prior note:, If patient has TURP, will require overnight stay.  Meatotomy only is an outpatient procedure.  Thank you

## 2023-08-21 NOTE — TELEPHONE ENCOUNTER
JUAN MIGUEL CALLED FROM [facility].  SHE DID IN PERSON PAT ON 08/18/23, AND DID A URINE.  PLEASE REVIEW.    SHE SAID PATIENT WENT TO URGENT CARE ON 08/19/23 AND IN [facility] ER.  SHE SAID HIS HEAD WAS FEELING FUNNY WHEN HE WENT TO URGENT CARE, AND WHEN HE WENT TO THE ER, IT WAS FOR HYPERTENSION.      BOTH PLACES DIAGNOSED HIM WITH A UTI.  HE IS ON KEFLEX 4 X DAY.    SHE SAID HIS SODIUM LEVEL  AND HIS TRIPONIN LEVEL WAS HIGH FOR THE HIGH SENSITIVITY ONE.  PLEASE REVIEW THE LABS FROM THAT DAY.

## 2023-08-22 NOTE — TELEPHONE ENCOUNTER
Patient stated understanding about not being able to stay overnight at the hospital for what he is having done.  This nurse explained that if there was an emergency or complication then yes he would be overnight. Also explained that he needed someone with him because of anesthesia and pain medication that patient will be taking he would need someone with him in case of needing something and to make sure he was able to transfer

## 2023-08-25 ENCOUNTER — ANESTHESIA EVENT (OUTPATIENT)
Dept: PERIOP | Facility: HOSPITAL | Age: 83
DRG: 659 | End: 2023-08-25
Payer: MEDICARE

## 2023-08-25 ENCOUNTER — ANESTHESIA (OUTPATIENT)
Dept: PERIOP | Facility: HOSPITAL | Age: 83
DRG: 659 | End: 2023-08-25
Payer: MEDICARE

## 2023-08-25 ENCOUNTER — HOSPITAL ENCOUNTER (INPATIENT)
Facility: HOSPITAL | Age: 83
LOS: 4 days | Discharge: HOME | DRG: 659 | End: 2023-08-30
Attending: UROLOGY | Admitting: UROLOGY
Payer: MEDICARE

## 2023-08-25 DIAGNOSIS — R31.0 GROSS HEMATURIA: ICD-10-CM

## 2023-08-25 DIAGNOSIS — Z78.9 DECREASED ACTIVITIES OF DAILY LIVING (ADL): ICD-10-CM

## 2023-08-25 DIAGNOSIS — R26.2 DIFFICULTY WALKING: ICD-10-CM

## 2023-08-25 DIAGNOSIS — N35.919 STRICTURE OF MALE URETHRA, UNSPECIFIED STRICTURE TYPE: ICD-10-CM

## 2023-08-25 DIAGNOSIS — Z90.79 S/P TURP (STATUS POST TRANSURETHRAL RESECTION OF PROSTATE): ICD-10-CM

## 2023-08-25 DIAGNOSIS — N13.30 HYDRONEPHROSIS, UNSPECIFIED HYDRONEPHROSIS TYPE: ICD-10-CM

## 2023-08-25 DIAGNOSIS — Z90.79 S/P TURP: Primary | ICD-10-CM

## 2023-08-25 DIAGNOSIS — I21.4 NSTEMI (NON-ST ELEVATED MYOCARDIAL INFARCTION): ICD-10-CM

## 2023-08-25 DIAGNOSIS — K43.9 VENTRAL HERNIA WITHOUT OBSTRUCTION OR GANGRENE: ICD-10-CM

## 2023-08-25 DIAGNOSIS — R39.11 BENIGN PROSTATIC HYPERPLASIA WITH URINARY HESITANCY: ICD-10-CM

## 2023-08-25 DIAGNOSIS — N40.1 BENIGN PROSTATIC HYPERPLASIA WITH URINARY HESITANCY: ICD-10-CM

## 2023-08-25 DIAGNOSIS — I24.9 ACS (ACUTE CORONARY SYNDROME): ICD-10-CM

## 2023-08-25 DIAGNOSIS — I20.0 UNSTABLE ANGINA: ICD-10-CM

## 2023-08-25 PROBLEM — N40.0 BPH (BENIGN PROSTATIC HYPERPLASIA): Status: ACTIVE | Noted: 2023-08-25

## 2023-08-25 LAB
ABO GROUP BLD: NORMAL
ANTI-FYA: NORMAL
BLD GP AB SCN SERPL QL: POSITIVE
GLUCOSE BLDC GLUCOMTR-MCNC: 115 MG/DL (ref 70–99)
GLUCOSE BLDC GLUCOMTR-MCNC: 229 MG/DL (ref 70–99)
RH BLD: POSITIVE
T&S EXPIRATION DATE: NORMAL

## 2023-08-25 PROCEDURE — 25010000002 VANCOMYCIN 1 G RECONSTITUTED SOLUTION: Performed by: NURSE ANESTHETIST, CERTIFIED REGISTERED

## 2023-08-25 PROCEDURE — 63710000001 ROSUVASTATIN 20 MG TABLET: Performed by: INTERNAL MEDICINE

## 2023-08-25 PROCEDURE — 25010000002 LEVOFLOXACIN PER 250 MG: Performed by: UROLOGY

## 2023-08-25 PROCEDURE — 25010000002 AMPICILLIN PER 500 MG: Performed by: INTERNAL MEDICINE

## 2023-08-25 PROCEDURE — 52601 PROSTATECTOMY (TURP): CPT | Performed by: UROLOGY

## 2023-08-25 PROCEDURE — A9270 NON-COVERED ITEM OR SERVICE: HCPCS | Performed by: INTERNAL MEDICINE

## 2023-08-25 PROCEDURE — 63710000001 HYDROXYZINE 25 MG TABLET: Performed by: INTERNAL MEDICINE

## 2023-08-25 PROCEDURE — A9270 NON-COVERED ITEM OR SERVICE: HCPCS | Performed by: UROLOGY

## 2023-08-25 PROCEDURE — 63710000001 BACITRACIN 500 UNIT/GM OINTMENT: Performed by: UROLOGY

## 2023-08-25 PROCEDURE — 63710000001 OXYBUTYNIN 5 MG TABLET: Performed by: UROLOGY

## 2023-08-25 PROCEDURE — 86870 RBC ANTIBODY IDENTIFICATION: CPT | Performed by: UROLOGY

## 2023-08-25 PROCEDURE — 0VB08ZZ EXCISION OF PROSTATE, VIA NATURAL OR ARTIFICIAL OPENING ENDOSCOPIC: ICD-10-PCS | Performed by: UROLOGY

## 2023-08-25 PROCEDURE — 0T7D8ZZ DILATION OF URETHRA, VIA NATURAL OR ARTIFICIAL OPENING ENDOSCOPIC: ICD-10-PCS | Performed by: UROLOGY

## 2023-08-25 PROCEDURE — 25010000002 MIDAZOLAM PER 1MG: Performed by: ANESTHESIOLOGY

## 2023-08-25 PROCEDURE — 63710000001 HYDROCODONE-ACETAMINOPHEN 10-325 MG TABLET: Performed by: UROLOGY

## 2023-08-25 PROCEDURE — 63710000001 CALCIUM CARBONATE 500 MG CHEWABLE TABLET: Performed by: UROLOGY

## 2023-08-25 PROCEDURE — 93005 ELECTROCARDIOGRAM TRACING: CPT | Performed by: ANESTHESIOLOGY

## 2023-08-25 PROCEDURE — 25010000002 PROPOFOL 10 MG/ML EMULSION: Performed by: NURSE ANESTHETIST, CERTIFIED REGISTERED

## 2023-08-25 PROCEDURE — 88342 IMHCHEM/IMCYTCHM 1ST ANTB: CPT | Performed by: UROLOGY

## 2023-08-25 PROCEDURE — 25010000002 LEVOFLOXACIN PER 250 MG: Performed by: NURSE ANESTHETIST, CERTIFIED REGISTERED

## 2023-08-25 PROCEDURE — 93010 ELECTROCARDIOGRAM REPORT: CPT | Performed by: INTERNAL MEDICINE

## 2023-08-25 PROCEDURE — 25010000002 FENTANYL CITRATE (PF) 50 MCG/ML SOLUTION: Performed by: NURSE ANESTHETIST, CERTIFIED REGISTERED

## 2023-08-25 PROCEDURE — 63710000001 INSULIN LISPRO (HUMAN) PER 5 UNITS: Performed by: INTERNAL MEDICINE

## 2023-08-25 PROCEDURE — 94761 N-INVAS EAR/PLS OXIMETRY MLT: CPT

## 2023-08-25 PROCEDURE — 86901 BLOOD TYPING SEROLOGIC RH(D): CPT | Performed by: UROLOGY

## 2023-08-25 PROCEDURE — 0 LIDOCAINE 1 % SOLUTION: Performed by: UROLOGY

## 2023-08-25 PROCEDURE — 94760 N-INVAS EAR/PLS OXIMETRY 1: CPT

## 2023-08-25 PROCEDURE — 63710000001 AMLODIPINE 5 MG TABLET: Performed by: INTERNAL MEDICINE

## 2023-08-25 PROCEDURE — 82948 REAGENT STRIP/BLOOD GLUCOSE: CPT

## 2023-08-25 PROCEDURE — 25010000002 DEXAMETHASONE PER 1 MG: Performed by: NURSE ANESTHETIST, CERTIFIED REGISTERED

## 2023-08-25 PROCEDURE — 25010000002 SUGAMMADEX 200 MG/2ML SOLUTION: Performed by: NURSE ANESTHETIST, CERTIFIED REGISTERED

## 2023-08-25 PROCEDURE — 86900 BLOOD TYPING SEROLOGIC ABO: CPT | Performed by: UROLOGY

## 2023-08-25 PROCEDURE — 86850 RBC ANTIBODY SCREEN: CPT | Performed by: UROLOGY

## 2023-08-25 PROCEDURE — 88305 TISSUE EXAM BY PATHOLOGIST: CPT | Performed by: UROLOGY

## 2023-08-25 PROCEDURE — 88341 IMHCHEM/IMCYTCHM EA ADD ANTB: CPT | Performed by: UROLOGY

## 2023-08-25 PROCEDURE — 94799 UNLISTED PULMONARY SVC/PX: CPT

## 2023-08-25 PROCEDURE — 25010000002 MORPHINE PER 10 MG: Performed by: UROLOGY

## 2023-08-25 PROCEDURE — S0260 H&P FOR SURGERY: HCPCS | Performed by: UROLOGY

## 2023-08-25 PROCEDURE — 25010000002 ONDANSETRON PER 1 MG: Performed by: NURSE ANESTHETIST, CERTIFIED REGISTERED

## 2023-08-25 RX ORDER — DIAPER,BRIEF,INFANT-TODD,DISP
1 EACH MISCELLANEOUS EVERY 8 HOURS SCHEDULED
Status: DISCONTINUED | OUTPATIENT
Start: 2023-08-25 | End: 2023-08-30 | Stop reason: HOSPADM

## 2023-08-25 RX ORDER — NICOTINE POLACRILEX 4 MG
15 LOZENGE BUCCAL
Status: DISCONTINUED | OUTPATIENT
Start: 2023-08-25 | End: 2023-08-30 | Stop reason: HOSPADM

## 2023-08-25 RX ORDER — INSULIN LISPRO 100 [IU]/ML
2-7 INJECTION, SOLUTION INTRAVENOUS; SUBCUTANEOUS
Status: DISCONTINUED | OUTPATIENT
Start: 2023-08-25 | End: 2023-08-30 | Stop reason: HOSPADM

## 2023-08-25 RX ORDER — HYDROXYZINE HYDROCHLORIDE 25 MG/1
25 TABLET, FILM COATED ORAL 3 TIMES DAILY PRN
Status: DISCONTINUED | OUTPATIENT
Start: 2023-08-25 | End: 2023-08-26

## 2023-08-25 RX ORDER — ACETAMINOPHEN 650 MG/1
650 SUPPOSITORY RECTAL EVERY 4 HOURS PRN
Status: DISCONTINUED | OUTPATIENT
Start: 2023-08-25 | End: 2023-08-30 | Stop reason: HOSPADM

## 2023-08-25 RX ORDER — ONDANSETRON 2 MG/ML
4 INJECTION INTRAMUSCULAR; INTRAVENOUS ONCE AS NEEDED
Status: DISCONTINUED | OUTPATIENT
Start: 2023-08-25 | End: 2023-08-25

## 2023-08-25 RX ORDER — ONDANSETRON 2 MG/ML
INJECTION INTRAMUSCULAR; INTRAVENOUS AS NEEDED
Status: DISCONTINUED | OUTPATIENT
Start: 2023-08-25 | End: 2023-08-25 | Stop reason: SURG

## 2023-08-25 RX ORDER — LEVOTHYROXINE SODIUM 0.07 MG/1
75 TABLET ORAL
Status: DISCONTINUED | OUTPATIENT
Start: 2023-08-26 | End: 2023-08-30 | Stop reason: HOSPADM

## 2023-08-25 RX ORDER — MAGNESIUM HYDROXIDE 1200 MG/15ML
LIQUID ORAL AS NEEDED
Status: DISCONTINUED | OUTPATIENT
Start: 2023-08-25 | End: 2023-08-25 | Stop reason: HOSPADM

## 2023-08-25 RX ORDER — PROMETHAZINE HYDROCHLORIDE 25 MG/1
25 SUPPOSITORY RECTAL ONCE AS NEEDED
Status: DISCONTINUED | OUTPATIENT
Start: 2023-08-25 | End: 2023-08-25

## 2023-08-25 RX ORDER — LEVOFLOXACIN 5 MG/ML
INJECTION, SOLUTION INTRAVENOUS AS NEEDED
Status: DISCONTINUED | OUTPATIENT
Start: 2023-08-25 | End: 2023-08-25 | Stop reason: SURG

## 2023-08-25 RX ORDER — ACETAMINOPHEN 500 MG
1000 TABLET ORAL ONCE
Status: DISCONTINUED | OUTPATIENT
Start: 2023-08-25 | End: 2023-08-25

## 2023-08-25 RX ORDER — SODIUM CHLORIDE 0.9 % (FLUSH) 0.9 %
10 SYRINGE (ML) INJECTION AS NEEDED
Status: DISCONTINUED | OUTPATIENT
Start: 2023-08-25 | End: 2023-08-30 | Stop reason: HOSPADM

## 2023-08-25 RX ORDER — OXYCODONE HYDROCHLORIDE AND ACETAMINOPHEN 5; 325 MG/1; MG/1
1 TABLET ORAL EVERY 4 HOURS PRN
Status: DISCONTINUED | OUTPATIENT
Start: 2023-08-25 | End: 2023-08-30 | Stop reason: HOSPADM

## 2023-08-25 RX ORDER — ROCURONIUM BROMIDE 10 MG/ML
INJECTION, SOLUTION INTRAVENOUS AS NEEDED
Status: DISCONTINUED | OUTPATIENT
Start: 2023-08-25 | End: 2023-08-25 | Stop reason: SURG

## 2023-08-25 RX ORDER — LEVOFLOXACIN 5 MG/ML
500 INJECTION, SOLUTION INTRAVENOUS ONCE
Status: COMPLETED | OUTPATIENT
Start: 2023-08-25 | End: 2023-08-25

## 2023-08-25 RX ORDER — SODIUM CHLORIDE 9 MG/ML
40 INJECTION, SOLUTION INTRAVENOUS AS NEEDED
Status: DISCONTINUED | OUTPATIENT
Start: 2023-08-25 | End: 2023-08-30 | Stop reason: HOSPADM

## 2023-08-25 RX ORDER — AMLODIPINE BESYLATE 5 MG/1
5 TABLET ORAL DAILY
Status: DISCONTINUED | OUTPATIENT
Start: 2023-08-25 | End: 2023-08-26

## 2023-08-25 RX ORDER — CALCIUM CARBONATE 500 MG/1
1 TABLET, CHEWABLE ORAL 3 TIMES DAILY PRN
Status: DISCONTINUED | OUTPATIENT
Start: 2023-08-25 | End: 2023-08-30 | Stop reason: HOSPADM

## 2023-08-25 RX ORDER — FENTANYL CITRATE 50 UG/ML
INJECTION, SOLUTION INTRAMUSCULAR; INTRAVENOUS AS NEEDED
Status: DISCONTINUED | OUTPATIENT
Start: 2023-08-25 | End: 2023-08-25 | Stop reason: SURG

## 2023-08-25 RX ORDER — DOCUSATE SODIUM 100 MG/1
100 CAPSULE, LIQUID FILLED ORAL 2 TIMES DAILY PRN
Status: DISCONTINUED | OUTPATIENT
Start: 2023-08-25 | End: 2023-08-30 | Stop reason: HOSPADM

## 2023-08-25 RX ORDER — OXYBUTYNIN CHLORIDE 5 MG/1
5 TABLET ORAL 3 TIMES DAILY PRN
Status: DISCONTINUED | OUTPATIENT
Start: 2023-08-25 | End: 2023-08-30 | Stop reason: HOSPADM

## 2023-08-25 RX ORDER — SODIUM CHLORIDE 0.9 % (FLUSH) 0.9 %
10 SYRINGE (ML) INJECTION EVERY 12 HOURS SCHEDULED
Status: DISCONTINUED | OUTPATIENT
Start: 2023-08-25 | End: 2023-08-30 | Stop reason: HOSPADM

## 2023-08-25 RX ORDER — VANCOMYCIN HYDROCHLORIDE 1 G/20ML
INJECTION, POWDER, LYOPHILIZED, FOR SOLUTION INTRAVENOUS AS NEEDED
Status: DISCONTINUED | OUTPATIENT
Start: 2023-08-25 | End: 2023-08-25 | Stop reason: SURG

## 2023-08-25 RX ORDER — HYDROCODONE BITARTRATE AND ACETAMINOPHEN 10; 325 MG/1; MG/1
1 TABLET ORAL EVERY 4 HOURS PRN
Status: DISCONTINUED | OUTPATIENT
Start: 2023-08-25 | End: 2023-08-26

## 2023-08-25 RX ORDER — DEXAMETHASONE SODIUM PHOSPHATE 4 MG/ML
INJECTION, SOLUTION INTRA-ARTICULAR; INTRALESIONAL; INTRAMUSCULAR; INTRAVENOUS; SOFT TISSUE AS NEEDED
Status: DISCONTINUED | OUTPATIENT
Start: 2023-08-25 | End: 2023-08-25 | Stop reason: SURG

## 2023-08-25 RX ORDER — NALOXONE HCL 0.4 MG/ML
0.4 VIAL (ML) INJECTION
Status: DISCONTINUED | OUTPATIENT
Start: 2023-08-25 | End: 2023-08-26

## 2023-08-25 RX ORDER — ROSUVASTATIN CALCIUM 20 MG/1
20 TABLET, COATED ORAL NIGHTLY
Status: DISCONTINUED | OUTPATIENT
Start: 2023-08-25 | End: 2023-08-30 | Stop reason: HOSPADM

## 2023-08-25 RX ORDER — DEXTROSE MONOHYDRATE 25 G/50ML
25 INJECTION, SOLUTION INTRAVENOUS
Status: DISCONTINUED | OUTPATIENT
Start: 2023-08-25 | End: 2023-08-30 | Stop reason: HOSPADM

## 2023-08-25 RX ORDER — ONDANSETRON 4 MG/1
4 TABLET, FILM COATED ORAL EVERY 6 HOURS PRN
Status: DISCONTINUED | OUTPATIENT
Start: 2023-08-25 | End: 2023-08-30 | Stop reason: HOSPADM

## 2023-08-25 RX ORDER — PROMETHAZINE HYDROCHLORIDE 12.5 MG/1
25 TABLET ORAL ONCE AS NEEDED
Status: DISCONTINUED | OUTPATIENT
Start: 2023-08-25 | End: 2023-08-25

## 2023-08-25 RX ORDER — ONDANSETRON 2 MG/ML
4 INJECTION INTRAMUSCULAR; INTRAVENOUS EVERY 6 HOURS PRN
Status: DISCONTINUED | OUTPATIENT
Start: 2023-08-25 | End: 2023-08-30 | Stop reason: HOSPADM

## 2023-08-25 RX ORDER — MIDAZOLAM HYDROCHLORIDE 2 MG/2ML
INJECTION, SOLUTION INTRAMUSCULAR; INTRAVENOUS AS NEEDED
Status: DISCONTINUED | OUTPATIENT
Start: 2023-08-25 | End: 2023-08-25 | Stop reason: SURG

## 2023-08-25 RX ORDER — SODIUM CHLORIDE, SODIUM LACTATE, POTASSIUM CHLORIDE, CALCIUM CHLORIDE 600; 310; 30; 20 MG/100ML; MG/100ML; MG/100ML; MG/100ML
9 INJECTION, SOLUTION INTRAVENOUS CONTINUOUS PRN
Status: DISCONTINUED | OUTPATIENT
Start: 2023-08-25 | End: 2023-08-30 | Stop reason: HOSPADM

## 2023-08-25 RX ORDER — LIDOCAINE HYDROCHLORIDE 10 MG/ML
INJECTION, SOLUTION INFILTRATION; PERINEURAL AS NEEDED
Status: DISCONTINUED | OUTPATIENT
Start: 2023-08-25 | End: 2023-08-25 | Stop reason: HOSPADM

## 2023-08-25 RX ORDER — OXYCODONE HCL 5 MG/5 ML
5 SOLUTION, ORAL ORAL EVERY 4 HOURS PRN
Status: DISCONTINUED | OUTPATIENT
Start: 2023-08-25 | End: 2023-08-25

## 2023-08-25 RX ORDER — LIDOCAINE HYDROCHLORIDE 20 MG/ML
INJECTION, SOLUTION EPIDURAL; INFILTRATION; INTRACAUDAL; PERINEURAL AS NEEDED
Status: DISCONTINUED | OUTPATIENT
Start: 2023-08-25 | End: 2023-08-25 | Stop reason: SURG

## 2023-08-25 RX ORDER — ACETAMINOPHEN 325 MG/1
650 TABLET ORAL EVERY 4 HOURS PRN
Status: DISCONTINUED | OUTPATIENT
Start: 2023-08-25 | End: 2023-08-27 | Stop reason: SDUPTHER

## 2023-08-25 RX ORDER — MORPHINE SULFATE 2 MG/ML
2 INJECTION, SOLUTION INTRAMUSCULAR; INTRAVENOUS
Status: DISCONTINUED | OUTPATIENT
Start: 2023-08-25 | End: 2023-08-26

## 2023-08-25 RX ORDER — SODIUM CHLORIDE 9 MG/ML
60 INJECTION, SOLUTION INTRAVENOUS CONTINUOUS
Status: DISCONTINUED | OUTPATIENT
Start: 2023-08-25 | End: 2023-08-29

## 2023-08-25 RX ORDER — EPHEDRINE SULFATE 50 MG/ML
INJECTION, SOLUTION INTRAVENOUS AS NEEDED
Status: DISCONTINUED | OUTPATIENT
Start: 2023-08-25 | End: 2023-08-25 | Stop reason: SURG

## 2023-08-25 RX ORDER — LISINOPRIL 10 MG/1
10 TABLET ORAL DAILY
COMMUNITY
End: 2023-08-30 | Stop reason: HOSPADM

## 2023-08-25 RX ORDER — MIDAZOLAM HYDROCHLORIDE 2 MG/2ML
2 INJECTION, SOLUTION INTRAMUSCULAR; INTRAVENOUS ONCE
Status: COMPLETED | OUTPATIENT
Start: 2023-08-25 | End: 2023-08-25

## 2023-08-25 RX ORDER — PROPOFOL 10 MG/ML
VIAL (ML) INTRAVENOUS AS NEEDED
Status: DISCONTINUED | OUTPATIENT
Start: 2023-08-25 | End: 2023-08-25 | Stop reason: SURG

## 2023-08-25 RX ADMIN — ROCURONIUM BROMIDE 40 MG: 50 INJECTION INTRAVENOUS at 14:31

## 2023-08-25 RX ADMIN — ROCURONIUM BROMIDE 10 MG: 50 INJECTION INTRAVENOUS at 15:08

## 2023-08-25 RX ADMIN — SODIUM CHLORIDE, POTASSIUM CHLORIDE, SODIUM LACTATE AND CALCIUM CHLORIDE 9 ML/HR: 600; 310; 30; 20 INJECTION, SOLUTION INTRAVENOUS at 13:10

## 2023-08-25 RX ADMIN — MORPHINE SULFATE 2 MG: 2 INJECTION, SOLUTION INTRAMUSCULAR; INTRAVENOUS at 17:24

## 2023-08-25 RX ADMIN — ONDANSETRON 4 MG: 2 INJECTION INTRAMUSCULAR; INTRAVENOUS at 16:10

## 2023-08-25 RX ADMIN — EPHEDRINE SULFATE 10 MG: 50 INJECTION INTRAVENOUS at 15:24

## 2023-08-25 RX ADMIN — MIDAZOLAM HYDROCHLORIDE 2 MG: 1 INJECTION, SOLUTION INTRAMUSCULAR; INTRAVENOUS at 14:21

## 2023-08-25 RX ADMIN — LIDOCAINE HYDROCHLORIDE 60 MG: 20 INJECTION, SOLUTION EPIDURAL; INFILTRATION; INTRACAUDAL; PERINEURAL at 14:31

## 2023-08-25 RX ADMIN — VANCOMYCIN HYDROCHLORIDE 1 G: 1 INJECTION, POWDER, FOR SOLUTION INTRAVENOUS at 14:52

## 2023-08-25 RX ADMIN — HYDROXYZINE HYDROCHLORIDE 25 MG: 25 TABLET, FILM COATED ORAL at 20:13

## 2023-08-25 RX ADMIN — LEVOFLOXACIN 500 MG: 500 INJECTION, SOLUTION INTRAVENOUS at 14:31

## 2023-08-25 RX ADMIN — AMPICILLIN INJECTION 500 MG: 500 POWDER, FOR SOLUTION INTRAMUSCULAR; INTRAVENOUS at 21:31

## 2023-08-25 RX ADMIN — BACITRACIN 0.9 G: 500 OINTMENT TOPICAL at 21:31

## 2023-08-25 RX ADMIN — HYDROCODONE BITARTRATE AND ACETAMINOPHEN 1 TABLET: 10; 325 TABLET ORAL at 21:30

## 2023-08-25 RX ADMIN — OXYBUTYNIN CHLORIDE 5 MG: 5 TABLET ORAL at 22:26

## 2023-08-25 RX ADMIN — INSULIN LISPRO 3 UNITS: 100 INJECTION, SOLUTION INTRAVENOUS; SUBCUTANEOUS at 22:26

## 2023-08-25 RX ADMIN — CALCIUM CARBONATE 1 TABLET: 500 TABLET, CHEWABLE ORAL at 23:39

## 2023-08-25 RX ADMIN — ROSUVASTATIN 20 MG: 20 TABLET, FILM COATED ORAL at 21:30

## 2023-08-25 RX ADMIN — FENTANYL CITRATE 50 MCG: 50 INJECTION, SOLUTION INTRAMUSCULAR; INTRAVENOUS at 15:06

## 2023-08-25 RX ADMIN — DEXAMETHASONE SODIUM PHOSPHATE 4 MG: 4 INJECTION, SOLUTION INTRAMUSCULAR; INTRAVENOUS at 14:37

## 2023-08-25 RX ADMIN — LEVOFLOXACIN 500 MG: 5 INJECTION, SOLUTION INTRAVENOUS at 12:47

## 2023-08-25 RX ADMIN — PROPOFOL 120 MG: 10 INJECTION, EMULSION INTRAVENOUS at 14:31

## 2023-08-25 RX ADMIN — EPHEDRINE SULFATE 15 MG: 50 INJECTION INTRAVENOUS at 14:47

## 2023-08-25 RX ADMIN — AMLODIPINE BESYLATE 5 MG: 5 TABLET ORAL at 19:35

## 2023-08-25 RX ADMIN — MIDAZOLAM HYDROCHLORIDE 2 MG: 1 INJECTION, SOLUTION INTRAMUSCULAR; INTRAVENOUS at 16:57

## 2023-08-25 RX ADMIN — MORPHINE SULFATE 2 MG: 2 INJECTION, SOLUTION INTRAMUSCULAR; INTRAVENOUS at 20:13

## 2023-08-25 RX ADMIN — SUGAMMADEX 200 MG: 100 INJECTION, SOLUTION INTRAVENOUS at 16:26

## 2023-08-25 RX ADMIN — FENTANYL CITRATE 50 MCG: 50 INJECTION, SOLUTION INTRAMUSCULAR; INTRAVENOUS at 14:55

## 2023-08-25 NOTE — ANESTHESIA POSTPROCEDURE EVALUATION
Patient: Andrea Odom    Procedure Summary       Date: 08/25/23 Room / Location: [facility] OR 01 / [facility] MAIN OR    Anesthesia Start: 1425 Anesthesia Stop: 1641    Procedure: MEATOTOMY, cysotscopy, transurethral resection of prostate Diagnosis:       Stricture of male urethra, unspecified stricture type      Benign prostatic hyperplasia with urinary hesitancy      (Stricture of male urethra, unspecified stricture type [N35.919])      (Benign prostatic hyperplasia with urinary hesitancy [N40.1, R39.11])    Surgeons: Debbie Rosa MD Provider: Kelly Sprague CRNA    Anesthesia Type: general ASA Status: 4            Anesthesia Type: general    Vitals  Vitals Value Taken Time   /70 08/25/23 1713   Temp 36.4 øC (97.5 øF) 08/25/23 1640   Pulse 86 08/25/23 1717   Resp 22 08/25/23 1700   SpO2 98 % 08/25/23 1717   Vitals shown include unvalidated device data.        Post Anesthesia Care and Evaluation    Patient location during evaluation: bedside  Patient participation: complete - patient participated  Level of consciousness: awake and anxious  Pain score: 2  Pain management: adequate    Airway patency: patent  PONV Status: none  Cardiovascular status: acceptable and stable  Respiratory status: acceptable  Hydration status: acceptable    Comments: An Anesthesiologist personally participated in the most demanding procedures (including induction and emergence if applicable) in the anesthesia plan, monitored the course of anesthesia administration at frequent intervals and remained physically present and available for immediate diagnosis and treatment of emergencies.        Pt with hx of anxiety, states he he feels he has scratchy throat, has already had ice chips swallowed without incident, pt complaining of severe anxiety and chest pain, gave 2mg iv versed and then obtained 12 lead EKG which revealed nsr, old q waves inferiorly, nonspecific t wave changes and prolonged qt, no active or acute ischemia in any  leads.    Pt vss doing well will transfer to floor

## 2023-08-25 NOTE — OP NOTE
Preoperative diagnosis  Meatal stenosis, BPH with LUTS    Postoperative diagnosis  Meatal stenosis, BPH with LUTS    Procedure performed  Cystoscopy, transurethral resection of prostate  Meatotomy    Attending surgeon  Debbie Rosa MD     Anesthesia  General    EBL  100 mL    Complications  None    Specimen  Prostate chips    Findings  Tight meatus; opened with meatotomy; prostatomegaly with bilateral coapting lateral lobes and median lobe; resected wide open channel.  Several bladder diverticula; largest left posterior bladder wall with some small bladder stones; questionable papillary carpeting at diverticular wall resected and fulgurated; unable to obtain distinct specimen aside from prostate chips; no other lesions or suspicious tumors.  Bilateral orthotopic ureters not involved in the resection    Indications  83 y.o. male agreed to undergo the above named procedure after discussion of the alternatives, risks and benefits.   Informed consent was obtained.      Procedure  After informed consent was obtained the patient was taken back to the operating suite.  He was given anesthesia care, and was placed in dorsal lithotomy and then prepped and draped in the usual standard sterile manner.  Timeout was performed.  Penile block was injected using 10 cc 0.25% plain Marcaine.  Upon inspection patient was noted to have a very meatus which would not accommodate the cystoscope.  A straight mosquito clamp was placed on the ventral meatus and clamped for 5 minutes. This was removed and the area was cut with tenotomy scissors to develop an adequate meatal opening.  Once the meatus was noted to be adequate, 4-0 chromic suture was used to involute the urethral mucosa to the meatal skin edge in a running manner from dorsum to ventrum on both sides.      Cystoscopy was then performed.  21 Portuguese rigid cystoscope was able to be inserted through the meatus without difficulty.  This scope passed through the urethra, there  were no urethral abnormalities noted.     The prostatic urethra showed signs of partial obstruction with enlarged lateral prostatic lobes and median lobe. Upon entering the bladder, pan cystoscopy was performed in a   360 degree fashion, the bladder was of large capacity.  There were 2 primary large diverticulum 1 at posterior wall and one at left lateral wall.  Both   ureteral orifices were seen in the expected location and with clear   urine efflux.  We then left the bladder full and removed our   cystoscope and introduced a 25-Faroese rigid resectoscope using the   visual obturator, and once again under direct vision.  Once we   entered into the bladder, this was switched to the working element of   the resectoscope and then we performed judicious transurethral   resection of the prostate with great attention staying just proximal   from the verumontanum and also resecting just enough tissue to open   up his channel.  After this, we hand irrigated the prostate chips out   and then performed judicious electrocautery to achieve hemostasis.  The bladder stones were irrigated out of the left lateral diverticulum.  At this point, there was a papillary lesion on the posterior lip of the diverticulum.  This was resected and fulgurated, attempts to irrigate out just the specimen unfortunately were unsuccessful and separate pathologic specimen was not able to be sent.  Final inspection of the prostatic bed revealed adequate hemostasis.  Bilateral orthotopic ureters uninvolved in the resection.  The procedure was concluded, and the resectoscope was removed.  A 22-  Faroese Marroquin catheter with 3 way ports were then in place with 30 mL   of sterile water in the balloon.  The patient was in stable condition   throughout and he was transferred to PACU for recovery.      The meatus was then inspected one last time and there was no significant bleeding that was encountered. The area was covered with Bacitracin ointment without  bandages. Patient was then awaken from anesthesia without complications and transferred to the Recovery Room (RR). The patient arrived to the RR in stable condition and without complications.      Signed:  Debbie Rosa MD  08/25/23  16:53 EDT

## 2023-08-25 NOTE — Clinical Note
Mynx was used in achieving hemostasis. Closure device deployed in the vessel. Hemostasis achieved successfully.

## 2023-08-25 NOTE — H&P
[facility]   Urology Preop H&P Note    Patient Name: Andrea Odom  : 1940  MRN: [id number]  Primary Care Physician:  Connie Knapp MD  Referring Physician: KERRY Allen  Date of admission: 2023    Subjective   Subjective     Reason for Consult/ Chief Complaint: Stricture of male urethra, unspecified stricture type [N35.919]  Benign prostatic hyperplasia with urinary hesitancy [N40.1, R39.11]    HPI:  Andrea Odom is a 83 y.o. male history ofStricture of male urethra, unspecified stricture type [N35.919]  Benign prostatic hyperplasia with urinary hesitancy [N40.1, R39.11] who presents for further management OR.  Presents for planned Procedure(s):  MEATOTOMY, cysotscopy, possible transurethral resection of prostate;  . Risk, benefits, and alternatives discussed with patient prior to today. Risks benefits and alternatives discussed with patient.  Risks including but not limited to bleeding, infection, pain, recurrence of stricture, damage to surrounding structure, need for further procedures, need for decompression of his bladder via catheter or self cath, worsening urinary symptoms, sexual side effects, urinary incontinence, or damage to surrounding structures.  Also notes understanding this is a procedure performed under anesthesia which has inherent risks including and up to death.  Patient was understanding of these risks and is agreeable to proceed.All questions were addressed after providing time for discussion.  Patient denies significant changes since last visit.  No new complaints today.    Review of Systems   All systems were reviewed and negative except for the above  Personal History     Past Medical History:   Diagnosis Date    Anxiety     Arthritis     BPH (benign prostatic hyperplasia)     Coronary artery disease     HAD STENTS PLACED. FOLLOWED BY DR HANSON. DECREASED ACTIVITY USES CANE    Diabetes mellitus     Frequent UTI     GERD (gastroesophageal reflux  "disease)     Hyperlipidemia     Hypertension     Old MI (myocardial infarction)     SAID THINKS ABOUT 4-5 YEARS AGO    Stricture of male urethra     Stroke     SAYS ABOUT 4-5 YEARS AGO NO RESIDUAL       Past Surgical History:   Procedure Laterality Date    CARDIAC CATHETERIZATION      stent placed    CARDIAC CATHETERIZATION N/A 08/11/2022    Procedure: Left Heart Cath;  Surgeon: Allison Lazcano MD;  Location: [facility] INVASIVE LOCATION;  Service: Cardiovascular;  Laterality: N/A;    CARDIAC CATHETERIZATION N/A 08/11/2022    Procedure: Possible Percutaneous Coronary Intervention;  Surgeon: Allison Lazcano MD;  Location: [facility] CATH INVASIVE LOCATION;  Service: Cardiovascular;  Laterality: N/A;    CARDIAC SURGERY      2 VESSEL CABG SAYS AROUND 35-40    SHOULDER SURGERY      RCR UNSURE OF SIDE       Family History: family history is not on file. Otherwise pertinent FHx was reviewed and not pertinent to current issue.    Social History:  reports that he has never smoked. He has never been exposed to tobacco smoke. He has never used smokeless tobacco. He reports that he does not drink alcohol and does not use drugs.    Home Medications:  Echinacea, Magnesium Oxide -Mg Supplement, amLODIPine, ampicillin, aspirin, cephalexin, clopidogrel, cyanocobalamin, fluorouracil, fluticasone, glimepiride, ketoconazole, levothyroxine, lisinopril-hydrochlorothiazide, metFORMIN, nitroglycerin, omeprazole, rosuvastatin, and triamcinolone    Allergies:  Allergies   Allergen Reactions    Latex, Natural Rubber Rash    Pantoprazole Other (See Comments)     \"MADE ME FEEL BAD\"    Penicillins Dizziness           Sulfa Antibiotics Other (See Comments)     \"FEEL BAD\"       Objective    Objective     Vitals:        Physical Exam:   Constitutional: Awake, alert   Respiratory: Clear, nonlabored respirations    Cardiovascular: Regular rate, no chest retractions   gastrointestinal: Appears soft, nontender     Results:    Assessment & " Plan   Assessment / Plan     Brief Patient Summary:  Andrea Odom is a 83 y.o. male     Active Hospital Problems:  Active Hospital Problems    Diagnosis     Stricture of male urethra     Benign prostatic hyperplasia with urinary hesitancy        Plan:   Proceed to the OR for planned procedure, Procedure(s):  MEATOTOMY, cysotscopy, possible transurethral resection of prostate,  ,   Risk, benefits, and alternatives discussed with patient at length he is agreeable to proceed  All questions addressed      Electronically signed by Debbie Rosa MD, 08/25/23, 12:27 PM EDT.

## 2023-08-25 NOTE — Clinical Note
The DP pulses are +1 bilaterally. The PT pulses are +1 bilaterally. The femoral pulses are +2 bilaterally.

## 2023-08-25 NOTE — H&P
"      [facility]  [facility]   Patient Name: Andrea Odom  : 1940  MRN: [id number]  Primary Care Physician:  Connie Knapp MD  Date of admission: 2023    Subjective   Subjective     Chief Complaint: Medical management of blood pressure and diabetes postoperative    HPI:    Andrea Odom is a 83 y.o. male medical history of anxiety/depression, hypothyroidism, BPH, coronary artery disease with CABG and stents, type 2 diabetes, GERD, dyslipidemia, and stroke who will be admitted to the hospital status post TURP.    Patient has been followed with urology.  He had a TURP today.  The procedure went well with no issues or concerns per the urologist.  Internal medicine was asked to consult on the patient due to coronary artery disease, blood pressure, and diabetes.  Patient's blood pressure was little elevated postoperatively.  Otherwise he did well and was doing well.      All systems reviewed abnormalities noted above    Personal History     Past Medical History:  Anxiety/depression  Hypothyroidism  BPH  Coronary artery disease with stents  Type 2 diabetes  GERD  Dyslipidemia  Stricture of urethra  Stroke with no residual deficits  Hypertension    Past Surgical History:  Heart catheterization  CABG  Shoulder surgery    Family History:   No family history of prostate issues    Social History:   Never smoked.  Never drank.    Home Medications:  Echinacea, Magnesium Oxide -Mg Supplement, amLODIPine, ampicillin, aspirin, cephalexin, clopidogrel, cyanocobalamin, fluorouracil, fluticasone, glimepiride, ketoconazole, levothyroxine, lisinopril-hydrochlorothiazide, metFORMIN, nitroglycerin, omeprazole, rosuvastatin, and triamcinolone    Allergies:  Allergies   Allergen Reactions    Latex, Natural Rubber Rash    Pantoprazole Other (See Comments)     \"MADE ME FEEL BAD\"    Penicillins Dizziness           Sulfa Antibiotics Other (See Comments)     \"FEEL BAD\" " Nephrology Daily Progress Note    Date of Service  11/29/2019    Chief Complaint  56 y.o. female admitted 11/28/2019 with fractured right collarbone.    Interval Problem Update  11/28/19 - Renal consult for ESRD.  Dialyzed.  11/29/19 - Feels ok.  Dialysis ordered for today.    Review of Systems  ROS     Physical Exam  Temp:  [36.1 °C (96.9 °F)-36.9 °C (98.4 °F)] 36.6 °C (97.9 °F)  Pulse:  [62-77] 75  Resp:  [16-20] 18  BP: (146-192)/(72-86) 164/86  SpO2:  [85 %-99 %] 93 %    Physical Exam    Fluids    Intake/Output Summary (Last 24 hours) at 11/29/2019 0835  Last data filed at 11/28/2019 1700  Gross per 24 hour   Intake 1040 ml   Output 2500 ml   Net -1460 ml       Laboratory  Recent Labs     11/28/19  1008 11/29/19  0338   WBC 9.2 7.3   RBC 3.34* 3.10*   HEMOGLOBIN 10.1* 9.4*   HEMATOCRIT 32.0* 30.6*   MCV 95.8 98.7*   MCH 30.2 30.3   MCHC 31.6* 30.7*   RDW 54.5* 56.3*   PLATELETCT 86* 105*   MPV 10.4 10.7     Recent Labs     11/28/19  1008 11/29/19  0338   SODIUM 139 138   POTASSIUM 6.1* 5.1   CHLORIDE 98 100   CO2 28 30   GLUCOSE 111* 94   BUN 66* 31*   CREATININE 9.01* 5.97*   CALCIUM 9.6 9.2     Recent Labs     11/28/19  1008 11/29/19  0338   INR 1.14* 1.14*     No results for input(s): NTPROBNP in the last 72 hours.           IMPRESSION:  1.  End-stage renal failure, on chronic maintenance hemodialysis.  2.  Hypertension.  3.  Hyperkalemia. Resolved.  4.  Close nondisplaced fracture, lateral right clavicle.  5.  History of paroxysmal atrial fibrillation.     Plan:  Dialysis today.         Objective   Objective     Vitals:   Temp:  [97.5 °F (36.4 °C)] 97.5 °F (36.4 °C)  Heart Rate:  [] 84  Resp:  [14-27] 22  BP: (123-161)/(57-75) 157/73    Physical Exam    Constitutional: Awake, alert, no acute distress   Eyes: Pupils equal, sclerae anicteric, no conjunctival injection   HENT: NCAT, mucous membranes moist   Neck: Supple, no thyromegaly, no lymphadenopathy, trachea midline   Respiratory: Clear to auscultation bilaterally, nonlabored respirations    Cardiovascular: RRR, no murmurs, rubs, or gallops, palpable pedal pulses bilaterally   Gastrointestinal: Positive bowel sounds, soft, nontender, nondistended.  Catheter in place   Musculoskeletal: No bilateral ankle edema, no clubbing or cyanosis to extremities   Psychiatric: Appropriate affect, cooperative   Neurologic: Oriented x 3, strength symmetric in all extremities, Cranial Nerves grossly intact to confrontation, speech clear   Skin: No rashes     Result Review    Result Review:  I have personally reviewed the results from the time of this admission to 8/25/2023 17:08 EDT and agree with these findings:  Reviewed imaging and lab      Assessment & Plan   Assessment / Plan     Assessment/Plan:   TURP postop day 0  Type 2 diabetes  Coronary artery disease with stents and CABG in the past  Hypertension  Dyslipidemia  Hypothyroidism    Plan:  --Admit to urology  -- Internal medicine consult  -- Continue CBI per urology  --As long as patient does well urology states patient should be able to go home tomorrow   -- Start sliding scale for type 2 diabetes  -- We will hold off on restarting blood pressure medications until we see what his blood pressure he can to do postoperatively  -- Would restart back Plavix and aspirin as soon as urology is okay with this although he does not appear to of had a stent placed in the last several years  -- Send CBC/CMP/magnesium tomorrow        DVT prophylaxis:  SCDs overnight but if has to stay longer would start  anticoagulation    CODE STATUS:     Full code    Admission Status:  I believe this patient meets observation status.    Electronically signed by Chino Moreira MD, 08/25/23, 5:08 PM EDT.

## 2023-08-25 NOTE — ANESTHESIA PREPROCEDURE EVALUATION
Anesthesia Evaluation     Patient summary reviewed and Nursing notes reviewed   no history of anesthetic complications:   NPO Solid Status: > 8 hours  NPO Liquid Status: > 2 hours           Airway   Mallampati: II  TM distance: >3 FB  Neck ROM: full  No difficulty expected  Dental    (+) poor dentition and partials    Pulmonary - normal exam    breath sounds clear to auscultation  (+) ,sleep apnea (possible)  Cardiovascular   Exercise tolerance: poor (<4 METS)    ECG reviewed  PT is on anticoagulation therapy  Rhythm: regular  Rate: normal    (+) hypertension well controlled 2 medications or greater, valvular problems/murmurs (bioprosthetic aortic valve replacement) AS, past MI  >12 months, CAD, CABG (35 years ago) >6 Months, cardiac stents (2 months ago (one stent)) Drug eluting stent within the past 12 months , angina (uses nitroglycerin--improved) at rest, orthopnea (2-3 pillows), BANSAL, murmur, hyperlipidemia,  carotid artery disease (R CEA; non-obstructive disease on the Left) carotid bilateral    ROS comment: Sinus rhythm  Probable left atrial enlargement  Probable inferior infarct, old  Nonspecific T abnormalities, lateral leads  EF 46 - 50%.Left ventricular diastolic function was indeterminate.  Left ventricular wall thickness is consistent with moderate concentric hypertrophy.    Held plavix 5 days ago      Neuro/Psych  (+) CVA (8 years ago), dizziness/light headedness, weakness, psychiatric history Anxiety  GI/Hepatic/Renal/Endo    (+) GERD poorly controlled, renal disease CRI, diabetes mellitus type 2, thyroid problem hypothyroidism    Musculoskeletal     (+) neck pain, radiculopathy Right lower extremity  Abdominal    Substance History - negative use     OB/GYN negative ob/gyn ROS         Other   arthritis,     ROS/Med Hx Other: Needs to sit up 45 degree plus when waking up gets choked up and hard to breath.      Swelling in legs.                   Anesthesia Plan    ASA 4     general     (Patient  understands anesthesia not responsible for dental damage.)  intravenous induction     Anesthetic plan, risks, benefits, and alternatives have been provided, discussed and informed consent has been obtained with: patient.  Pre-procedure education provided  Use of blood products discussed with patient  Consented to blood products.    Plan discussed with CRNA.      CODE STATUS:

## 2023-08-26 ENCOUNTER — ANESTHESIA EVENT (OUTPATIENT)
Dept: PERIOP | Facility: HOSPITAL | Age: 83
DRG: 659 | End: 2023-08-26
Payer: MEDICARE

## 2023-08-26 ENCOUNTER — ANESTHESIA (OUTPATIENT)
Dept: PERIOP | Facility: HOSPITAL | Age: 83
DRG: 659 | End: 2023-08-26
Payer: MEDICARE

## 2023-08-26 ENCOUNTER — APPOINTMENT (OUTPATIENT)
Dept: GENERAL RADIOLOGY | Facility: HOSPITAL | Age: 83
DRG: 659 | End: 2023-08-26
Payer: MEDICARE

## 2023-08-26 ENCOUNTER — APPOINTMENT (OUTPATIENT)
Dept: CT IMAGING | Facility: HOSPITAL | Age: 83
DRG: 659 | End: 2023-08-26
Payer: MEDICARE

## 2023-08-26 PROBLEM — R31.0 GROSS HEMATURIA: Status: ACTIVE | Noted: 2023-08-03

## 2023-08-26 PROBLEM — N13.30 HYDRONEPHROSIS: Status: ACTIVE | Noted: 2023-08-03

## 2023-08-26 LAB
ALBUMIN SERPL-MCNC: 3.8 G/DL (ref 3.5–5.2)
ALBUMIN/GLOB SERPL: 1.8 G/DL
ALP SERPL-CCNC: 73 U/L (ref 39–117)
ALT SERPL W P-5'-P-CCNC: 9 U/L (ref 1–41)
ANION GAP SERPL CALCULATED.3IONS-SCNC: 10.4 MMOL/L (ref 5–15)
ANION GAP SERPL CALCULATED.3IONS-SCNC: 9.3 MMOL/L (ref 5–15)
AST SERPL-CCNC: 19 U/L (ref 1–40)
BASOPHILS # BLD AUTO: 0.02 10*3/MM3 (ref 0–0.2)
BASOPHILS NFR BLD AUTO: 0.2 % (ref 0–1.5)
BILIRUB SERPL-MCNC: 0.6 MG/DL (ref 0–1.2)
BUN SERPL-MCNC: 21 MG/DL (ref 8–23)
BUN SERPL-MCNC: 22 MG/DL (ref 8–23)
BUN/CREAT SERPL: 14.1 (ref 7–25)
BUN/CREAT SERPL: 17.8 (ref 7–25)
CALCIUM SPEC-SCNC: 8.6 MG/DL (ref 8.6–10.5)
CALCIUM SPEC-SCNC: 8.9 MG/DL (ref 8.6–10.5)
CHLORIDE SERPL-SCNC: 92 MMOL/L (ref 98–107)
CHLORIDE SERPL-SCNC: 93 MMOL/L (ref 98–107)
CO2 SERPL-SCNC: 22.6 MMOL/L (ref 22–29)
CO2 SERPL-SCNC: 24.7 MMOL/L (ref 22–29)
CREAT SERPL-MCNC: 1.18 MG/DL (ref 0.76–1.27)
CREAT SERPL-MCNC: 1.56 MG/DL (ref 0.76–1.27)
DEPRECATED RDW RBC AUTO: 44.2 FL (ref 37–54)
DEPRECATED RDW RBC AUTO: 44.5 FL (ref 37–54)
EGFRCR SERPLBLD CKD-EPI 2021: 43.8 ML/MIN/1.73
EGFRCR SERPLBLD CKD-EPI 2021: 61.2 ML/MIN/1.73
EOSINOPHIL # BLD AUTO: 0 10*3/MM3 (ref 0–0.4)
EOSINOPHIL NFR BLD AUTO: 0 % (ref 0.3–6.2)
ERYTHROCYTE [DISTWIDTH] IN BLOOD BY AUTOMATED COUNT: 12.9 % (ref 12.3–15.4)
ERYTHROCYTE [DISTWIDTH] IN BLOOD BY AUTOMATED COUNT: 13.1 % (ref 12.3–15.4)
GLOBULIN UR ELPH-MCNC: 2.1 GM/DL
GLUCOSE BLDC GLUCOMTR-MCNC: 115 MG/DL (ref 70–99)
GLUCOSE BLDC GLUCOMTR-MCNC: 134 MG/DL (ref 70–99)
GLUCOSE BLDC GLUCOMTR-MCNC: 151 MG/DL (ref 70–99)
GLUCOSE BLDC GLUCOMTR-MCNC: 73 MG/DL (ref 70–99)
GLUCOSE BLDC GLUCOMTR-MCNC: 74 MG/DL (ref 70–99)
GLUCOSE SERPL-MCNC: 162 MG/DL (ref 65–99)
GLUCOSE SERPL-MCNC: 185 MG/DL (ref 65–99)
HCT VFR BLD AUTO: 32.7 % (ref 37.5–51)
HCT VFR BLD AUTO: 33.9 % (ref 37.5–51)
HGB BLD-MCNC: 11.3 G/DL (ref 13–17.7)
HGB BLD-MCNC: 11.4 G/DL (ref 13–17.7)
IMM GRANULOCYTES # BLD AUTO: 0.05 10*3/MM3 (ref 0–0.05)
IMM GRANULOCYTES NFR BLD AUTO: 0.4 % (ref 0–0.5)
LYMPHOCYTES # BLD AUTO: 0.88 10*3/MM3 (ref 0.7–3.1)
LYMPHOCYTES NFR BLD AUTO: 7.8 % (ref 19.6–45.3)
MAGNESIUM SERPL-MCNC: 1.9 MG/DL (ref 1.6–2.4)
MCH RBC QN AUTO: 31.1 PG (ref 26.6–33)
MCH RBC QN AUTO: 32.2 PG (ref 26.6–33)
MCHC RBC AUTO-ENTMCNC: 33.3 G/DL (ref 31.5–35.7)
MCHC RBC AUTO-ENTMCNC: 34.9 G/DL (ref 31.5–35.7)
MCV RBC AUTO: 92.4 FL (ref 79–97)
MCV RBC AUTO: 93.4 FL (ref 79–97)
MONOCYTES # BLD AUTO: 0.92 10*3/MM3 (ref 0.1–0.9)
MONOCYTES NFR BLD AUTO: 8.1 % (ref 5–12)
NEUTROPHILS NFR BLD AUTO: 83.5 % (ref 42.7–76)
NEUTROPHILS NFR BLD AUTO: 9.48 10*3/MM3 (ref 1.7–7)
NRBC BLD AUTO-RTO: 0 /100 WBC (ref 0–0.2)
PLATELET # BLD AUTO: 202 10*3/MM3 (ref 140–450)
PLATELET # BLD AUTO: 210 10*3/MM3 (ref 140–450)
PMV BLD AUTO: 10.6 FL (ref 6–12)
PMV BLD AUTO: 9.9 FL (ref 6–12)
POTASSIUM SERPL-SCNC: 4.5 MMOL/L (ref 3.5–5.2)
POTASSIUM SERPL-SCNC: 4.5 MMOL/L (ref 3.5–5.2)
PROT SERPL-MCNC: 5.9 G/DL (ref 6–8.5)
QT INTERVAL: 437 MS
QTC INTERVAL: 521 MS
RBC # BLD AUTO: 3.54 10*6/MM3 (ref 4.14–5.8)
RBC # BLD AUTO: 3.63 10*6/MM3 (ref 4.14–5.8)
SODIUM SERPL-SCNC: 125 MMOL/L (ref 136–145)
SODIUM SERPL-SCNC: 127 MMOL/L (ref 136–145)
WBC NRBC COR # BLD: 11.35 10*3/MM3 (ref 3.4–10.8)
WBC NRBC COR # BLD: 11.55 10*3/MM3 (ref 3.4–10.8)

## 2023-08-26 PROCEDURE — 71045 X-RAY EXAM CHEST 1 VIEW: CPT

## 2023-08-26 PROCEDURE — 76000 FLUOROSCOPY <1 HR PHYS/QHP: CPT

## 2023-08-26 PROCEDURE — 63710000001 BACITRACIN 500 UNIT/GM OINTMENT: Performed by: UROLOGY

## 2023-08-26 PROCEDURE — 82948 REAGENT STRIP/BLOOD GLUCOSE: CPT

## 2023-08-26 PROCEDURE — 25510000001 IOPAMIDOL PER 1 ML: Performed by: UROLOGY

## 2023-08-26 PROCEDURE — 85025 COMPLETE CBC W/AUTO DIFF WBC: CPT | Performed by: INTERNAL MEDICINE

## 2023-08-26 PROCEDURE — 0T5B8ZZ DESTRUCTION OF BLADDER, VIA NATURAL OR ARTIFICIAL OPENING ENDOSCOPIC: ICD-10-PCS | Performed by: UROLOGY

## 2023-08-26 PROCEDURE — 63710000001 CLONAZEPAM 0.5 MG TABLET: Performed by: STUDENT IN AN ORGANIZED HEALTH CARE EDUCATION/TRAINING PROGRAM

## 2023-08-26 PROCEDURE — 63710000001 INSULIN LISPRO (HUMAN) PER 5 UNITS: Performed by: INTERNAL MEDICINE

## 2023-08-26 PROCEDURE — 63710000001 LEVOTHYROXINE 75 MCG TABLET: Performed by: INTERNAL MEDICINE

## 2023-08-26 PROCEDURE — 85027 COMPLETE CBC AUTOMATED: CPT | Performed by: UROLOGY

## 2023-08-26 PROCEDURE — 63710000001 OXYBUTYNIN 5 MG TABLET: Performed by: UROLOGY

## 2023-08-26 PROCEDURE — 80053 COMPREHEN METABOLIC PANEL: CPT | Performed by: INTERNAL MEDICINE

## 2023-08-26 PROCEDURE — C1769 GUIDE WIRE: HCPCS | Performed by: UROLOGY

## 2023-08-26 PROCEDURE — C1758 CATHETER, URETERAL: HCPCS | Performed by: UROLOGY

## 2023-08-26 PROCEDURE — A9270 NON-COVERED ITEM OR SERVICE: HCPCS | Performed by: FAMILY MEDICINE

## 2023-08-26 PROCEDURE — A9270 NON-COVERED ITEM OR SERVICE: HCPCS | Performed by: PHYSICIAN ASSISTANT

## 2023-08-26 PROCEDURE — A9270 NON-COVERED ITEM OR SERVICE: HCPCS | Performed by: INTERNAL MEDICINE

## 2023-08-26 PROCEDURE — 63710000001 ROSUVASTATIN 20 MG TABLET: Performed by: UROLOGY

## 2023-08-26 PROCEDURE — 0TCB8ZZ EXTIRPATION OF MATTER FROM BLADDER, VIA NATURAL OR ARTIFICIAL OPENING ENDOSCOPIC: ICD-10-PCS | Performed by: UROLOGY

## 2023-08-26 PROCEDURE — 74420 UROGRAPHY RTRGR +-KUB: CPT | Performed by: UROLOGY

## 2023-08-26 PROCEDURE — 63710000001 ALPRAZOLAM 0.25 MG TABLET: Performed by: FAMILY MEDICINE

## 2023-08-26 PROCEDURE — 25010000002 MORPHINE PER 10 MG: Performed by: UROLOGY

## 2023-08-26 PROCEDURE — 99024 POSTOP FOLLOW-UP VISIT: CPT | Performed by: UROLOGY

## 2023-08-26 PROCEDURE — C2617 STENT, NON-COR, TEM W/O DEL: HCPCS | Performed by: UROLOGY

## 2023-08-26 PROCEDURE — 94799 UNLISTED PULMONARY SVC/PX: CPT

## 2023-08-26 PROCEDURE — 0T768DZ DILATION OF RIGHT URETER WITH INTRALUMINAL DEVICE, VIA NATURAL OR ARTIFICIAL OPENING ENDOSCOPIC: ICD-10-PCS | Performed by: UROLOGY

## 2023-08-26 PROCEDURE — 63710000001 LIDOCAINE 4 % PATCH: Performed by: PHYSICIAN ASSISTANT

## 2023-08-26 PROCEDURE — 25010000002 FENTANYL CITRATE (PF) 50 MCG/ML SOLUTION: Performed by: NURSE ANESTHETIST, CERTIFIED REGISTERED

## 2023-08-26 PROCEDURE — 83735 ASSAY OF MAGNESIUM: CPT | Performed by: INTERNAL MEDICINE

## 2023-08-26 PROCEDURE — 74176 CT ABD & PELVIS W/O CONTRAST: CPT

## 2023-08-26 PROCEDURE — 63710000001 HYDROCODONE-ACETAMINOPHEN 10-325 MG TABLET: Performed by: UROLOGY

## 2023-08-26 PROCEDURE — 63710000001 FAMOTIDINE 20 MG TABLET: Performed by: PHYSICIAN ASSISTANT

## 2023-08-26 PROCEDURE — A9270 NON-COVERED ITEM OR SERVICE: HCPCS | Performed by: UROLOGY

## 2023-08-26 PROCEDURE — 25010000002 DEXAMETHASONE PER 1 MG: Performed by: NURSE ANESTHETIST, CERTIFIED REGISTERED

## 2023-08-26 PROCEDURE — BT1F1ZZ FLUOROSCOPY OF LEFT KIDNEY, URETER AND BLADDER USING LOW OSMOLAR CONTRAST: ICD-10-PCS | Performed by: UROLOGY

## 2023-08-26 PROCEDURE — 25010000002 MIDAZOLAM PER 1MG: Performed by: NURSE ANESTHETIST, CERTIFIED REGISTERED

## 2023-08-26 PROCEDURE — 25010000002 SUGAMMADEX 200 MG/2ML SOLUTION: Performed by: NURSE ANESTHETIST, CERTIFIED REGISTERED

## 2023-08-26 PROCEDURE — 52332 CYSTOSCOPY AND TREATMENT: CPT | Performed by: UROLOGY

## 2023-08-26 PROCEDURE — 94761 N-INVAS EAR/PLS OXIMETRY MLT: CPT

## 2023-08-26 PROCEDURE — 25010000002 AMPICILLIN PER 500 MG: Performed by: UROLOGY

## 2023-08-26 PROCEDURE — 25010000002 ONDANSETRON PER 1 MG: Performed by: NURSE ANESTHETIST, CERTIFIED REGISTERED

## 2023-08-26 PROCEDURE — 25010000002 PROPOFOL 10 MG/ML EMULSION: Performed by: NURSE ANESTHETIST, CERTIFIED REGISTERED

## 2023-08-26 PROCEDURE — 52001 CYSTO W/IRRG&EVAC MLT CLOTS: CPT | Performed by: UROLOGY

## 2023-08-26 PROCEDURE — A9270 NON-COVERED ITEM OR SERVICE: HCPCS | Performed by: STUDENT IN AN ORGANIZED HEALTH CARE EDUCATION/TRAINING PROGRAM

## 2023-08-26 PROCEDURE — 25010000002 AMPICILLIN PER 500 MG: Performed by: INTERNAL MEDICINE

## 2023-08-26 DEVICE — URETERAL STENT
Type: IMPLANTABLE DEVICE | Site: URETER | Status: FUNCTIONAL
Brand: ASCERTA™

## 2023-08-26 RX ORDER — LIDOCAINE HYDROCHLORIDE 20 MG/ML
INJECTION, SOLUTION EPIDURAL; INFILTRATION; INTRACAUDAL; PERINEURAL AS NEEDED
Status: DISCONTINUED | OUTPATIENT
Start: 2023-08-26 | End: 2023-08-26 | Stop reason: SURG

## 2023-08-26 RX ORDER — PHENYLEPHRINE HCL IN 0.9% NACL 1 MG/10 ML
SYRINGE (ML) INTRAVENOUS AS NEEDED
Status: DISCONTINUED | OUTPATIENT
Start: 2023-08-26 | End: 2023-08-26 | Stop reason: SURG

## 2023-08-26 RX ORDER — HYDROMORPHONE HYDROCHLORIDE 2 MG/1
2 TABLET ORAL
Status: DISCONTINUED | OUTPATIENT
Start: 2023-08-26 | End: 2023-08-26 | Stop reason: HOSPADM

## 2023-08-26 RX ORDER — MAGNESIUM HYDROXIDE 1200 MG/15ML
LIQUID ORAL AS NEEDED
Status: DISCONTINUED | OUTPATIENT
Start: 2023-08-26 | End: 2023-08-26 | Stop reason: HOSPADM

## 2023-08-26 RX ORDER — ALPRAZOLAM 0.25 MG/1
0.25 TABLET ORAL 3 TIMES DAILY PRN
Status: DISCONTINUED | OUTPATIENT
Start: 2023-08-26 | End: 2023-08-30 | Stop reason: HOSPADM

## 2023-08-26 RX ORDER — MIDAZOLAM HYDROCHLORIDE 2 MG/2ML
INJECTION, SOLUTION INTRAMUSCULAR; INTRAVENOUS AS NEEDED
Status: DISCONTINUED | OUTPATIENT
Start: 2023-08-26 | End: 2023-08-26 | Stop reason: SURG

## 2023-08-26 RX ORDER — CLONAZEPAM 0.5 MG/1
0.5 TABLET ORAL ONCE
Status: COMPLETED | OUTPATIENT
Start: 2023-08-26 | End: 2023-08-26

## 2023-08-26 RX ORDER — HYDROCODONE BITARTRATE AND ACETAMINOPHEN 5; 325 MG/1; MG/1
1 TABLET ORAL EVERY 4 HOURS PRN
Status: DISCONTINUED | OUTPATIENT
Start: 2023-08-26 | End: 2023-08-30 | Stop reason: HOSPADM

## 2023-08-26 RX ORDER — MORPHINE SULFATE 2 MG/ML
2 INJECTION, SOLUTION INTRAMUSCULAR; INTRAVENOUS EVERY 4 HOURS PRN
Status: DISCONTINUED | OUTPATIENT
Start: 2023-08-26 | End: 2023-08-30 | Stop reason: HOSPADM

## 2023-08-26 RX ORDER — PROMETHAZINE HYDROCHLORIDE 25 MG/1
25 SUPPOSITORY RECTAL ONCE AS NEEDED
Status: DISCONTINUED | OUTPATIENT
Start: 2023-08-26 | End: 2023-08-26 | Stop reason: HOSPADM

## 2023-08-26 RX ORDER — PROPOFOL 10 MG/ML
VIAL (ML) INTRAVENOUS AS NEEDED
Status: DISCONTINUED | OUTPATIENT
Start: 2023-08-26 | End: 2023-08-26 | Stop reason: SURG

## 2023-08-26 RX ORDER — PROMETHAZINE HYDROCHLORIDE 12.5 MG/1
25 TABLET ORAL ONCE AS NEEDED
Status: DISCONTINUED | OUTPATIENT
Start: 2023-08-26 | End: 2023-08-26 | Stop reason: HOSPADM

## 2023-08-26 RX ORDER — ROCURONIUM BROMIDE 10 MG/ML
INJECTION, SOLUTION INTRAVENOUS AS NEEDED
Status: DISCONTINUED | OUTPATIENT
Start: 2023-08-26 | End: 2023-08-26 | Stop reason: SURG

## 2023-08-26 RX ORDER — DEXAMETHASONE SODIUM PHOSPHATE 4 MG/ML
INJECTION, SOLUTION INTRA-ARTICULAR; INTRALESIONAL; INTRAMUSCULAR; INTRAVENOUS; SOFT TISSUE AS NEEDED
Status: DISCONTINUED | OUTPATIENT
Start: 2023-08-26 | End: 2023-08-26 | Stop reason: SURG

## 2023-08-26 RX ORDER — ONDANSETRON 2 MG/ML
4 INJECTION INTRAMUSCULAR; INTRAVENOUS ONCE AS NEEDED
Status: DISCONTINUED | OUTPATIENT
Start: 2023-08-26 | End: 2023-08-26 | Stop reason: HOSPADM

## 2023-08-26 RX ORDER — SUCCINYLCHOLINE/SOD CL,ISO/PF 100 MG/5ML
SYRINGE (ML) INTRAVENOUS AS NEEDED
Status: DISCONTINUED | OUTPATIENT
Start: 2023-08-26 | End: 2023-08-26 | Stop reason: SURG

## 2023-08-26 RX ORDER — EPHEDRINE SULFATE 50 MG/ML
INJECTION, SOLUTION INTRAVENOUS AS NEEDED
Status: DISCONTINUED | OUTPATIENT
Start: 2023-08-26 | End: 2023-08-26 | Stop reason: SURG

## 2023-08-26 RX ORDER — FAMOTIDINE 20 MG/1
40 TABLET, FILM COATED ORAL ONCE
Status: COMPLETED | OUTPATIENT
Start: 2023-08-26 | End: 2023-08-26

## 2023-08-26 RX ORDER — ONDANSETRON 2 MG/ML
INJECTION INTRAMUSCULAR; INTRAVENOUS AS NEEDED
Status: DISCONTINUED | OUTPATIENT
Start: 2023-08-26 | End: 2023-08-26 | Stop reason: SURG

## 2023-08-26 RX ORDER — LIDOCAINE 4 G/G
1 PATCH TOPICAL EVERY 24 HOURS
Status: DISCONTINUED | OUTPATIENT
Start: 2023-08-26 | End: 2023-08-30 | Stop reason: HOSPADM

## 2023-08-26 RX ORDER — FENTANYL CITRATE 50 UG/ML
INJECTION, SOLUTION INTRAMUSCULAR; INTRAVENOUS AS NEEDED
Status: DISCONTINUED | OUTPATIENT
Start: 2023-08-26 | End: 2023-08-26 | Stop reason: SURG

## 2023-08-26 RX ORDER — HYDROCODONE BITARTRATE AND ACETAMINOPHEN 10; 325 MG/1; MG/1
1 TABLET ORAL EVERY 4 HOURS PRN
Status: DISCONTINUED | OUTPATIENT
Start: 2023-08-26 | End: 2023-08-30 | Stop reason: HOSPADM

## 2023-08-26 RX ADMIN — INSULIN LISPRO 2 UNITS: 100 INJECTION, SOLUTION INTRAVENOUS; SUBCUTANEOUS at 11:45

## 2023-08-26 RX ADMIN — SODIUM CHLORIDE 60 ML/HR: 9 INJECTION, SOLUTION INTRAVENOUS at 10:16

## 2023-08-26 RX ADMIN — ROCURONIUM BROMIDE 10 MG: 50 INJECTION INTRAVENOUS at 17:31

## 2023-08-26 RX ADMIN — SODIUM CHLORIDE 120 ML/HR: 9 INJECTION, SOLUTION INTRAVENOUS at 17:24

## 2023-08-26 RX ADMIN — SODIUM CHLORIDE 120 ML/HR: 9 INJECTION, SOLUTION INTRAVENOUS at 17:45

## 2023-08-26 RX ADMIN — EPHEDRINE SULFATE 10 MG: 50 INJECTION INTRAVENOUS at 17:35

## 2023-08-26 RX ADMIN — DEXAMETHASONE SODIUM PHOSPHATE 4 MG: 4 INJECTION, SOLUTION INTRAMUSCULAR; INTRAVENOUS at 17:35

## 2023-08-26 RX ADMIN — LIDOCAINE 1 PATCH: 4 PATCH TOPICAL at 01:48

## 2023-08-26 RX ADMIN — FAMOTIDINE 40 MG: 20 TABLET ORAL at 01:48

## 2023-08-26 RX ADMIN — PROPOFOL 100 MG: 10 INJECTION, EMULSION INTRAVENOUS at 17:31

## 2023-08-26 RX ADMIN — AMPICILLIN INJECTION 500 MG: 500 POWDER, FOR SOLUTION INTRAMUSCULAR; INTRAVENOUS at 15:41

## 2023-08-26 RX ADMIN — MIDAZOLAM HYDROCHLORIDE 1 MG: 1 INJECTION, SOLUTION INTRAMUSCULAR; INTRAVENOUS at 17:24

## 2023-08-26 RX ADMIN — Medication 100 MCG: at 17:47

## 2023-08-26 RX ADMIN — AMPICILLIN INJECTION 500 MG: 500 POWDER, FOR SOLUTION INTRAMUSCULAR; INTRAVENOUS at 21:39

## 2023-08-26 RX ADMIN — FENTANYL CITRATE 50 MCG: 50 INJECTION, SOLUTION INTRAMUSCULAR; INTRAVENOUS at 18:01

## 2023-08-26 RX ADMIN — BACITRACIN 0.9 G: 500 OINTMENT TOPICAL at 13:33

## 2023-08-26 RX ADMIN — SUGAMMADEX 200 MG: 100 INJECTION, SOLUTION INTRAVENOUS at 18:25

## 2023-08-26 RX ADMIN — Medication 100 MCG: at 18:08

## 2023-08-26 RX ADMIN — MIDAZOLAM HYDROCHLORIDE 1 MG: 1 INJECTION, SOLUTION INTRAMUSCULAR; INTRAVENOUS at 17:18

## 2023-08-26 RX ADMIN — ALPRAZOLAM 0.25 MG: 0.25 TABLET ORAL at 13:33

## 2023-08-26 RX ADMIN — ONDANSETRON 4 MG: 2 INJECTION INTRAMUSCULAR; INTRAVENOUS at 17:35

## 2023-08-26 RX ADMIN — EPHEDRINE SULFATE 10 MG: 50 INJECTION INTRAVENOUS at 17:43

## 2023-08-26 RX ADMIN — Medication 100 MCG: at 17:35

## 2023-08-26 RX ADMIN — BACITRACIN 0.9 G: 500 OINTMENT TOPICAL at 21:39

## 2023-08-26 RX ADMIN — AMPICILLIN INJECTION 500 MG: 500 POWDER, FOR SOLUTION INTRAMUSCULAR; INTRAVENOUS at 08:39

## 2023-08-26 RX ADMIN — BACITRACIN 0.9 G: 500 OINTMENT TOPICAL at 06:02

## 2023-08-26 RX ADMIN — ROSUVASTATIN 20 MG: 20 TABLET, FILM COATED ORAL at 21:39

## 2023-08-26 RX ADMIN — SODIUM CHLORIDE 60 ML/HR: 9 INJECTION, SOLUTION INTRAVENOUS at 19:59

## 2023-08-26 RX ADMIN — LEVOTHYROXINE SODIUM 75 MCG: 75 TABLET ORAL at 06:02

## 2023-08-26 RX ADMIN — HYDROCODONE BITARTRATE AND ACETAMINOPHEN 1 TABLET: 10; 325 TABLET ORAL at 08:04

## 2023-08-26 RX ADMIN — CLONAZEPAM 0.5 MG: 0.5 TABLET ORAL at 01:47

## 2023-08-26 RX ADMIN — AMPICILLIN INJECTION 500 MG: 500 POWDER, FOR SOLUTION INTRAMUSCULAR; INTRAVENOUS at 03:43

## 2023-08-26 RX ADMIN — FENTANYL CITRATE 50 MCG: 50 INJECTION, SOLUTION INTRAMUSCULAR; INTRAVENOUS at 17:31

## 2023-08-26 RX ADMIN — Medication 100 MCG: at 17:38

## 2023-08-26 RX ADMIN — Medication 100 MCG: at 17:56

## 2023-08-26 RX ADMIN — HYDROCODONE BITARTRATE AND ACETAMINOPHEN 1 TABLET: 10; 325 TABLET ORAL at 01:48

## 2023-08-26 RX ADMIN — OXYBUTYNIN CHLORIDE 5 MG: 5 TABLET ORAL at 06:02

## 2023-08-26 RX ADMIN — MORPHINE SULFATE 2 MG: 2 INJECTION, SOLUTION INTRAMUSCULAR; INTRAVENOUS at 00:32

## 2023-08-26 RX ADMIN — EPHEDRINE SULFATE 10 MG: 50 INJECTION INTRAVENOUS at 17:39

## 2023-08-26 RX ADMIN — EPHEDRINE SULFATE 10 MG: 50 INJECTION INTRAVENOUS at 17:47

## 2023-08-26 RX ADMIN — Medication 100 MCG: at 18:21

## 2023-08-26 RX ADMIN — Medication 10 ML: at 21:39

## 2023-08-26 RX ADMIN — Medication 100 MG: at 17:25

## 2023-08-26 RX ADMIN — LIDOCAINE HYDROCHLORIDE 75 MG: 20 INJECTION, SOLUTION EPIDURAL; INFILTRATION; INTRACAUDAL; PERINEURAL at 17:31

## 2023-08-26 RX ADMIN — EPHEDRINE SULFATE 10 MG: 50 INJECTION INTRAVENOUS at 17:34

## 2023-08-26 NOTE — PROGRESS NOTES
[facility]   Urology Progress Note    Patient Name: Andrea Odom  : 1940  MRN: [id number]  Primary Care Physician:  Connie Knapp MD  Date of admission: 2023    Subjective   Subjective       Complains of having the shakes.  States it is difficult to her to talk as he gets shortness of air.  Per staff was agitated overnight and agitated at times this morning.  Denies significant pain but does report some pain in the left flank radiating to the bladder yesterday.    Objective   Objective     Vitals:   Temp:  [97.3 °F (36.3 °C)-97.9 °F (36.6 °C)] 97.5 °F (36.4 °C)  Heart Rate:  [] 78  Resp:  [14-27] 18  BP: ()/(45-75) 96/45  Flow (L/min):  [0.5] 0.5  Physical Exam     Alert and oriented x3  No acute distress  Unlabored respirations, clear  Regular rate, no chest retractions  Nontender/nondistended   Catheter in place, low drip rate, slight hematuria, thin  : Patent meatus; no bleeding      Result Review    Result Review:  I have personally reviewed the results from the time of this admission to 2023 12:38 EDT and agree with these findings:  [x]  Laboratory  []  Microbiology  []  Radiology  []  EKG/Telemetry   []  Cardiology/Vascular   []  Pathology  []  Old records  []  Other:      Assessment & Plan   Assessment / Plan     Brief Patient Summary:  Andrea Odom is a 83 y.o. male history of p.o. stenosis, BPH with LUTS status post meatotomy, TURP, 2023    Active Hospital Problems:  Active Hospital Problems    Diagnosis     **BPH (benign prostatic hyperplasia)     S/P TURP (status post transurethral resection of prostate)     S/P TURP     Stricture of male urethra     Benign prostatic hyperplasia with urinary hesitancy      Plan:   Labs reviewed; upon repeat labs, creatinine to 1.56  Continue CBI, titrate drip rate down as tolerated  Stat CT to assess for obstructive uropathy given persistent agitation, continued complaints  N.p.o. for now  Recheck labs in the  morning  Appreciate hospitalist following for medical management    Electronically signed by Debbie Rosa MD, 08/26/23, 12:38 PM EDT.      Update: Stat CT scan imaging reviewed; patient with considerable clot burden within bladder despite Marroquin catheter and continuous bladder irrigation, mild left-sided hydronephrosis likely secondary to obstructive uropathy    Plan to proceed urgently to the OR for cystoscopy, clot evacuation, bladder fulguration, retrograde pyelogram, possible left ureteral stent insertion.  Risk, benefits, and alternatives discussed with patient including but not limited to bleeding, infection, damage to surrounding structure, need for further procedures, and pain.  Also aware that this is a procedure performed under anesthesia which has inherent risks including and up to death.  Also discussed that I am uncertain whether his persistent complaints regarding his breathing and shakes are urologically related and may improved, not change or worsen with the procedure.  Will defer to hospitalist service for further work-up and management.  Patient participated in discussion, notes understanding of the above risks and is agreeable to proceed.  OR notified  N.p.o.  All questions addressed    Electronically signed by Debbie Rosa MD, 08/26/23, 4:03 PM EDT.

## 2023-08-26 NOTE — PLAN OF CARE
Goal Outcome Evaluation:                   VSS, A&O x4, tolerating diet and medication, Pt going back into surgery for clot evacuation today. CBI was ongoing, medicated for pain and anxiety.

## 2023-08-26 NOTE — ANESTHESIA PREPROCEDURE EVALUATION
Anesthesia Evaluation     Patient summary reviewed and Nursing notes reviewed   no history of anesthetic complications:   NPO Solid Status: > 6 hours  NPO Liquid Status: > 6 hours           Airway   Mallampati: II  TM distance: >3 FB  Neck ROM: full  No difficulty expected  Dental    (+) poor dentition and partials    Pulmonary - normal exam    breath sounds clear to auscultation  (+) ,sleep apnea (possible)  Cardiovascular   Exercise tolerance: poor (<4 METS)    ECG reviewed  PT is on anticoagulation therapy  Rhythm: regular  Rate: normal    (+) hypertension well controlled 2 medications or greater, valvular problems/murmurs (bioprosthetic aortic valve replacement) AS, past MI  >12 months, CAD, CABG (35 years ago) >6 Months, cardiac stents (2 months ago (one stent)) Drug eluting stent within the past 12 months , angina (uses nitroglycerin--improved) at rest, orthopnea (2-3 pillows), BANSAL, murmur, hyperlipidemia,  carotid artery disease (R CEA; non-obstructive disease on the Left) carotid bilateral    ROS comment: Sinus rhythm  Probable left atrial enlargement  Probable inferior infarct, old  Nonspecific T abnormalities, lateral leads  EF 46 - 50%.Left ventricular diastolic function was indeterminate.  Left ventricular wall thickness is consistent with moderate concentric hypertrophy.    Held plavix 5 days ago      Neuro/Psych  (+) CVA (8 years ago), dizziness/light headedness, weakness, psychiatric history Anxiety  GI/Hepatic/Renal/Endo    (+) GERD poorly controlled, renal disease CRI, diabetes mellitus type 2, thyroid problem hypothyroidism    Musculoskeletal     (+) neck pain, radiculopathy Right lower extremity  Abdominal    Substance History - negative use     OB/GYN negative ob/gyn ROS         Other   arthritis,     ROS/Med Hx Other: Needs to sit up 45 degree plus when waking up gets choked up and hard to breath.      Swelling in legs.                   Anesthesia Plan    ASA 4     general and MAC     (Patient  understands anesthesia not responsible for dental damage.  S/p procedure yesterday with some hematuria)  intravenous induction     Anesthetic plan, risks, benefits, and alternatives have been provided, discussed and informed consent has been obtained with: patient.  Pre-procedure education provided  Use of blood products discussed with patient  Consented to blood products.    Plan discussed with CRNA.      CODE STATUS:    Code Status (Patient has no pulse and is not breathing): CPR (Attempt to Resuscitate)  Medical Interventions (Patient has pulse or is breathing): Full

## 2023-08-26 NOTE — SIGNIFICANT NOTE
"Was called to bedside due to patient having multiple complaints.    His biggest complaint is back pain.  On exam he has no CVA tenderness.  He is tender on his left SI joint upon palpation.  At time of my exam he did not have any pain to palpation to his mid back or sacrum.  He did state that the pain went down his left leg.  Also stated that this occasionally happens at home, but it seems worse now.  CBI continues to drain and is starting to clear.  Again, he had no CVA tenderness at time of my exam.    Patient was also complaining of some shortness of air that felt like \"something was taking his breath away\" and that he was \"choking\".  Chest x-ray ordered.  At time of my encounter with the patient at bedside his oxygen saturation on continuous pulse ox remained 98 to 100%.  Initially patient thought it was acid reflux, offered Pepcid that patient refused.  He had already tried Tums.  Has a listed allergy to Protonix.      Patient also stated that he knows that he has anxiety and wishes that he could get some rest.  Upon review it appears that he is prescribed BuSpar and clonazepam at home.  Patient does state that he takes something at nighttime for his nerves.  Dose of clonazepam was also ordered for the patient.    Electronically signed by GILLIAN Hannon, 08/26/23, 1:31 AM EDT.    "

## 2023-08-26 NOTE — PROGRESS NOTES
[facility]   Hospitalist Progress Note  Date: 2023  Patient Name: Andrea Odom  : 1940  MRN: [id number]  Date of admission: 2023      Subjective   Subjective       Chief complaint: Medical consult for blood pressure and diabetes    Summary:  83-year-old male with history of anxiety, depression, hypothyroidism, BPH with LUTS, coronary artery disease with history of CABG, stenting, diabetes, GERD without esophagitis, dyslipidemia, stroke, hospitalized after having TURP, has three-way catheter CBI after undergoing meatotomy, TURP, 2023.    Interval follow-up: Seen and examined, overnight had a significant amount of pain, back pain, bladder spasms, feeling like he could not breathe, choking, all subsided once he was able to rest, this morning continues to complain of bladder spasms and pain, urology already ordered a CT scan of the abdomen and pelvis.  Patient's creatinine has risen some today to 1.56, serum sodium at 127, white blood cell count 11,000 range, hemoglobin 11,000 range with ongoing hematuria with CBI, hemoglobin has not fallen.  No chest pain or palpitations.  Blood pressures are soft but stable, 90s over 50s, he is on room air satting 98%.  Blood sugars are controlled in the 100 range.    Review of systems:  All systems reviewed and negative except for bladder pains, bladder spasms, Marroquin catheter pain, back pain, anxiety, reflux, shortness of breath with exertion      Objective   Objective     Vitals:   Temp:  [97.3 °F (36.3 °C)-97.9 °F (36.6 °C)] 97.5 °F (36.4 °C)  Heart Rate:  [] 78  Resp:  [14-27] 18  BP: ()/(45-75) 96/45  Flow (L/min):  [0.5] 0.5  Physical Exam                 Constitutional: Awake, alert, no acute distress              Eyes: Pupils equal, sclerae anicteric, no conjunctival injection              HENT: NCAT, mucous membranes moist              Neck: Supple, no thyromegaly, no lymphadenopathy, trachea midline              Respiratory: Clear to  auscultation bilaterally, nonlabored respirations               Cardiovascular: RRR, no murmurs, rubs, or gallops, palpable pedal pulses bilaterally              Gastrointestinal: Positive bowel sounds, soft, nontender, nondistended.  Catheter in place              Musculoskeletal: No bilateral ankle edema, no clubbing or cyanosis to extremities              Psychiatric: Appropriate affect, cooperative              Neurologic: Oriented x 3, strength symmetric in all extremities, Cranial Nerves grossly intact to confrontation, speech clear              Skin: No rashes   : Marroquin catheter in place irrigating, blood-tinged urine, bladder tenderness    Result Review    Result Review:  I have personally reviewed the pertinent results from the past 24 hours to 8/26/2023 15:37 EDT and agree with these findings:  [x]  Laboratory   CBC          4/20/2023    11:27 8/19/2023    23:50 8/26/2023    04:25 8/26/2023    12:54   CBC   WBC 5.13  6.92  11.35  11.55    RBC 4.28  4.21  3.63  3.54    Hemoglobin 13.1  13.1  11.3  11.4    Hematocrit 38.6  37.7  33.9  32.7    MCV 90.2  89.5  93.4  92.4    MCH 30.6  31.1  31.1  32.2    MCHC 33.9  34.7  33.3  34.9    RDW 13.0  13.0  12.9  13.1    Platelets 151  181  202  210      BMP          4/20/2023    11:27 8/19/2023    23:50 8/26/2023    04:25 8/26/2023    12:54   BMP   BUN 25  20  21  22    Creatinine 1.38  1.24  1.18  1.56    Sodium 134  129  125  127    Potassium 4.3  4.1  4.5  4.5    Chloride 97  93  92  93    CO2 26.2  23.1  22.6  24.7    Calcium 9.4  9.7  8.6  8.9      LIVER FUNCTION TESTS:      Lab 08/26/23  0425 08/19/23  2350   TOTAL PROTEIN 5.9* 7.0   ALBUMIN 3.8 4.6   GLOBULIN 2.1 2.4   ALT (SGPT) 9 9   AST (SGOT) 19 15   BILIRUBIN 0.6 0.6   ALK PHOS 73 81       [x]  Microbiology No results found for: ACANTHNAEG, AFBCX, BPERTUSSISCX, BLOODCX  No results found for: BCIDPCR, CXREFLEX, CSFCX, CULTURETIS  No results found for: CULTURES, HSVCX, URCX  No results found for:  EYECULTURE, GCCX, HSVCULTURE, LABHSV  No results found for: LEGIONELLA, MRSACX, MUMPSCX, MYCOPLASCX  No results found for: NOCARDIACX, STOOLCX  No results found for: THROATCX, UNSTIMCULT, URINECX, CULTURE, VZVCULTUR  No results found for: VIRALCULTU, WOUNDCX    [x]  Radiology XR Chest 2 View    Result Date: 8/18/2023  PROCEDURE: XR CHEST 2 VW  COMPARISON: [facility], CR, CHEST AP/PA 1 VIEW, 8/15/2020, 15:01.  INDICATIONS: preop, no chest complaints  FINDINGS:  There are postoperative changes of CABG and TAVR.  The cardiac size is normal.  The lungs appear clear.        1. Status post CABG and TAVR 2. No active disease     Chilo Nicole MD       Electronically Signed and Approved By: Chilo Nicole MD on 8/18/2023 at 15:38             XR Chest 1 View    Result Date: 8/26/2023  PROCEDURE: XR CHEST 1 VW  COMPARISON: 8/19/2023.  INDICATIONS: shortness of air/breath; +choking sensation  FINDINGS:  A single AP (or PA) upright portable chest radiograph was performed.  No cardiac enlargement is seen.  No acute infiltrate is appreciated.  No pleural effusion or pneumothorax is identified.  The patient has undergone median sternotomy and suspected CABG surgery.  There are also postoperative changes of the left shoulder.  The thoracic aorta is atherosclerotic.  No significant interval change is seen since the prior study (or studies).        No acute infiltrate is appreciated.     Please note that portions of this note were completed with a voice recognition program.  FÁTIMA HERNANDEZ JR, MD       Electronically Signed and Approved By: FÁTIMA HERNANDEZ JR, MD on 8/26/2023 at 2:13              XR Chest 1 View    Result Date: 8/19/2023  PROCEDURE: XR CHEST 1 VW  COMPARISON: [facility], CR, XR CHEST 2 VW, 8/18/2023, 15:22.  INDICATIONS: COUGH, HYPERTENSION  FINDINGS:  LUNGS: Normal.  No significant pulmonary parenchymal abnormalities.  VASCULATURE: Normal.  Unremarkable pulmonary vasculature.   CARDIAC: Normal.  No cardiac silhouette abnormality or cardiomegaly.  MEDIASTINUM: Normal.  No visible mass or adenopathy.  PLEURA: Normal.  No effusion or pleural thickening.  BONES: Normal.  No fracture or visible bony lesion.  OTHER: Median sternotomy wires appear intact.       No acute cardiopulmonary process identified.       MILEY BONDS MD       Electronically Signed and Approved By: MILEY BONDS MD on 8/19/2023 at 22:38               [x]  EKG/Telemetry   []  Cardiology/Vascular   []  Pathology  [x]  Old records  []  Other:    Assessment & Plan   Assessment / Plan     Assessment/Plan:    Assessment:  Diabetes mellitus  CAD status post stents and CABG  Essential hypertension  Dyslipidemia  Anxiety  Depression  Hypothyroidism  Bladder spasms  Status post meatotomy/TURP    Plan:  Labs and imaging reviewed  Continue insulin sliding scale coverage  Follow-up CT abdomen and pelvis  Xanax 0.25 mg 3 times daily as needed for anxiety  Continue normal saline IV fluids at 60 cc/h  Continue levothyroxine 75 mcg daily  Given soft blood pressures currently, stopping Norvasc and monitoring  Continue Crestor 20 mg nightly  Depending on outcome of CT abdomen pelvis, urology may pursue further urological procedures  Need to resume plavix post procedure  Pain control with morphine as needed for breakthrough pain  Pain control with Norco for moderate-severe pain as ordered  A.m. labs  Full code  DVT prophylaxis with SCDs in setting of hematuria  Clinical course dictate further management  Discussed with nurse at the bedside  We will continue to follow while in the hospital    DVT prophylaxis:  Mechanical DVT prophylaxis orders are present.    CODE STATUS:   Code Status (Patient has no pulse and is not breathing): CPR (Attempt to Resuscitate)  Medical Interventions (Patient has pulse or is breathing): Full        Electronically signed by Gina Hensley MD, 08/26/23, 3:37 PM EDT.    Portions of this documentation were  transcribed electronically from a voice recognition software.  I confirm all data accurately represents the service(s) I performed at today's visit.

## 2023-08-26 NOTE — OP NOTE
Preoperative diagnosis  Hematuria, clot retention, left hydronephrosis    Postoperative diagnosis  Hematuria, clot retention, left hydronephrosis    Procedure performed  Cystoscopy, clot evacuation, bladder fulguration, left retrograde pyelogram, ureteral stent insertion    Attending surgeon  Debbie Rosa MD     Anesthesia  General    EBL  0 mL    Complications  None    Specimen  None    Findings  Approximately 500 mL of organized clot; primarily active oozing from the bladder anterior bladder neck; prostatic bed extensively fulgurated, total area fulguration approximately 3 cm  Adequate hemostasis at conclusion of case  Right ureteral orifice appears normal; clot protruding from left ureteral orifice; left retrograde pyelogram with moderate hydroureteronephrosis  6 x 26 stent no string  22 Persian three-way catheter with 30 cc balloon    Indications  83 y.o. male agreed to undergo the above named procedure after discussion of the alternatives, risks and benefits.   Informed consent was obtained.      Procedure  After informed consent was obtained, the patient was taken back to the  operating suite where general anesthesia was administered.  Once the patient  was adequately anesthetized, he was placed in the dorsal lithotomy position.  The genitals were prepped and draped in the normal sterile fashion.  The 27 Persian resectoscope with visual obturator was inserted to the bladder without difficulty.  Cystoscopy was attempted but only clot was visualized at this point.  Clot evacuation was then performed using the resectoscope sheath and Gail syringe.  Approximately 500 mL of old dark clot was irrigated out without significant difficulty.  The large left bladder diverticulum also contain clot which was irrigated out.  Once irrigant was clear the visual obturator was inserted through the resectoscope sheath and the bladder was able to be visualized entirely.  No further clot was appreciated.      The bladder  mucosa was inspected systematically.  There was no active bleeding within the bladder.  Prior resection site at the left anterior diverticulum was not actively bleeding.  There was significant area of the ooze at the anterior bladder neck and left lateral prostatic bed.  The visual obturator was exchanged for the working element of the resectoscope and plasma button was utilized to control the bleeding.  The bleeding vessels were extensively fulgurated at the anterior bladder neck.  Also, the prostatic bed particularly on the left lateral side was extensively fulgurated.  Careful inspection revealed other distinct areas to fulgurate.  Total area of fulguration approximately 3 cm.  Final inspection did not again reveal any active area of bleeding.  Once bleeding had been controlled.  The resectoscope was removed and the cystoscope inserted.  At this point the trigone was inspected.  The right ureteral orifice appeared normal.  The left ureteral orifice had clot protruding from it.  A sensor wire was threaded up through the left ureteral orifice.  Next a Pollick catheter was placed over the sensor wire into the distal ureter and wire removed.  Retrograde pyelogram revealed moderate hydroureteronephrosis.  The wire was then replaced.  Pollick reduced.  Finally a 6 x 26 stent no string was placed over the wire under fluoroscopic guidance.  Upon retrieval of the wire there was proximal coil noted within the upper pole and adequate distal coil in the bladder.  The bladder and prostatic urethra were then again thoroughly inspected filling and emptying the bladder and there was no active bleeding noted.  The scope was removed with the bladder remaining full and a 22-[place] Marroquin  catheter was inserted into the patient's bladder without difficulty and  secured with 30 mL in the balloon.  Catheter was placed on continuous bladder irrigation.  At this point the procedure was deemed  terminated.  The patient was placed back in  the supine position, awoken from  anesthesia, and transferred to the PACU in good condition.      Signed:  Debbie Rosa MD  08/26/23  18:42 EDT

## 2023-08-26 NOTE — PLAN OF CARE
Goal Outcome Evaluation:      VSS. Medicated several times for pain, see MAR. Oxybutynin PRN given x2 this shift for c/o bladder spasm. CBI at medium speed, unable to titrate lower due to increased redness.

## 2023-08-27 LAB
ANION GAP SERPL CALCULATED.3IONS-SCNC: 9.8 MMOL/L (ref 5–15)
BUN SERPL-MCNC: 19 MG/DL (ref 8–23)
BUN/CREAT SERPL: 14.6 (ref 7–25)
CALCIUM SPEC-SCNC: 8.6 MG/DL (ref 8.6–10.5)
CHLORIDE SERPL-SCNC: 104 MMOL/L (ref 98–107)
CK MB SERPL-CCNC: 13.35 NG/ML
CK SERPL-CCNC: 314 U/L (ref 20–200)
CO2 SERPL-SCNC: 17.2 MMOL/L (ref 22–29)
CREAT SERPL-MCNC: 1.3 MG/DL (ref 0.76–1.27)
DEPRECATED RDW RBC AUTO: 46.4 FL (ref 37–54)
EGFRCR SERPLBLD CKD-EPI 2021: 54.5 ML/MIN/1.73
ERYTHROCYTE [DISTWIDTH] IN BLOOD BY AUTOMATED COUNT: 13.2 % (ref 12.3–15.4)
GEN 5 2HR TROPONIN T REFLEX: 105 NG/L
GLUCOSE BLDC GLUCOMTR-MCNC: 166 MG/DL (ref 70–99)
GLUCOSE BLDC GLUCOMTR-MCNC: 178 MG/DL (ref 70–99)
GLUCOSE BLDC GLUCOMTR-MCNC: 97 MG/DL (ref 70–99)
GLUCOSE SERPL-MCNC: 170 MG/DL (ref 65–99)
HCT VFR BLD AUTO: 32.5 % (ref 37.5–51)
HGB BLD-MCNC: 10.6 G/DL (ref 13–17.7)
MCH RBC QN AUTO: 31.2 PG (ref 26.6–33)
MCHC RBC AUTO-ENTMCNC: 32.6 G/DL (ref 31.5–35.7)
MCV RBC AUTO: 95.6 FL (ref 79–97)
NT-PROBNP SERPL-MCNC: 855.7 PG/ML (ref 0–1800)
PLATELET # BLD AUTO: 158 10*3/MM3 (ref 140–450)
PMV BLD AUTO: 10.5 FL (ref 6–12)
POTASSIUM SERPL-SCNC: 5 MMOL/L (ref 3.5–5.2)
RBC # BLD AUTO: 3.4 10*6/MM3 (ref 4.14–5.8)
SODIUM SERPL-SCNC: 131 MMOL/L (ref 136–145)
TROPONIN T DELTA: 26 NG/L
TROPONIN T SERPL HS-MCNC: 79 NG/L
WBC NRBC COR # BLD: 8 10*3/MM3 (ref 3.4–10.8)

## 2023-08-27 PROCEDURE — B2131ZZ FLUOROSCOPY OF MULTIPLE CORONARY ARTERY BYPASS GRAFTS USING LOW OSMOLAR CONTRAST: ICD-10-PCS | Performed by: INTERNAL MEDICINE

## 2023-08-27 PROCEDURE — 80048 BASIC METABOLIC PNL TOTAL CA: CPT | Performed by: UROLOGY

## 2023-08-27 PROCEDURE — 93005 ELECTROCARDIOGRAM TRACING: CPT | Performed by: FAMILY MEDICINE

## 2023-08-27 PROCEDURE — A9270 NON-COVERED ITEM OR SERVICE: HCPCS | Performed by: FAMILY MEDICINE

## 2023-08-27 PROCEDURE — A9270 NON-COVERED ITEM OR SERVICE: HCPCS | Performed by: UROLOGY

## 2023-08-27 PROCEDURE — 94761 N-INVAS EAR/PLS OXIMETRY MLT: CPT

## 2023-08-27 PROCEDURE — 83880 ASSAY OF NATRIURETIC PEPTIDE: CPT | Performed by: FAMILY MEDICINE

## 2023-08-27 PROCEDURE — 63710000001 INSULIN LISPRO (HUMAN) PER 5 UNITS: Performed by: INTERNAL MEDICINE

## 2023-08-27 PROCEDURE — B3121ZZ FLUOROSCOPY OF LEFT SUBCLAVIAN ARTERY USING LOW OSMOLAR CONTRAST: ICD-10-PCS | Performed by: INTERNAL MEDICINE

## 2023-08-27 PROCEDURE — 25510000001 IOPAMIDOL PER 1 ML: Performed by: INTERNAL MEDICINE

## 2023-08-27 PROCEDURE — C1894 INTRO/SHEATH, NON-LASER: HCPCS | Performed by: INTERNAL MEDICINE

## 2023-08-27 PROCEDURE — 93010 ELECTROCARDIOGRAM REPORT: CPT | Performed by: INTERNAL MEDICINE

## 2023-08-27 PROCEDURE — 63710000001 ASPIRIN 81 MG CHEWABLE TABLET: Performed by: FAMILY MEDICINE

## 2023-08-27 PROCEDURE — 85027 COMPLETE CBC AUTOMATED: CPT | Performed by: UROLOGY

## 2023-08-27 PROCEDURE — 63710000001 NITROGLYCERIN 0.4 MG SUBLINGUAL TABLET: Performed by: FAMILY MEDICINE

## 2023-08-27 PROCEDURE — 84484 ASSAY OF TROPONIN QUANT: CPT | Performed by: FAMILY MEDICINE

## 2023-08-27 PROCEDURE — 25010000002 AMPICILLIN PER 500 MG: Performed by: INTERNAL MEDICINE

## 2023-08-27 PROCEDURE — 63710000001 BACITRACIN 500 UNIT/GM OINTMENT 14 G TUBE: Performed by: UROLOGY

## 2023-08-27 PROCEDURE — 94799 UNLISTED PULMONARY SVC/PX: CPT

## 2023-08-27 PROCEDURE — 82550 ASSAY OF CK (CPK): CPT | Performed by: FAMILY MEDICINE

## 2023-08-27 PROCEDURE — 82948 REAGENT STRIP/BLOOD GLUCOSE: CPT

## 2023-08-27 PROCEDURE — 63710000001 ALPRAZOLAM 0.25 MG TABLET: Performed by: UROLOGY

## 2023-08-27 PROCEDURE — B2111ZZ FLUOROSCOPY OF MULTIPLE CORONARY ARTERIES USING LOW OSMOLAR CONTRAST: ICD-10-PCS | Performed by: INTERNAL MEDICINE

## 2023-08-27 PROCEDURE — S0260 H&P FOR SURGERY: HCPCS | Performed by: INTERNAL MEDICINE

## 2023-08-27 PROCEDURE — 63710000001 ALUMINUM-MAGNESIUM HYDROXIDE-SIMETHICONE 400-400-40 MG/5ML SUSPENSION: Performed by: FAMILY MEDICINE

## 2023-08-27 PROCEDURE — 25010000002 AMPICILLIN PER 500 MG: Performed by: UROLOGY

## 2023-08-27 PROCEDURE — C1760 CLOSURE DEV, VASC: HCPCS | Performed by: INTERNAL MEDICINE

## 2023-08-27 PROCEDURE — 4A023N7 MEASUREMENT OF CARDIAC SAMPLING AND PRESSURE, LEFT HEART, PERCUTANEOUS APPROACH: ICD-10-PCS | Performed by: INTERNAL MEDICINE

## 2023-08-27 PROCEDURE — 63710000001 LEVOTHYROXINE 75 MCG TABLET: Performed by: UROLOGY

## 2023-08-27 PROCEDURE — 93459 L HRT ART/GRFT ANGIO: CPT | Performed by: INTERNAL MEDICINE

## 2023-08-27 PROCEDURE — 63710000001 BACITRACIN 500 UNIT/GM OINTMENT: Performed by: UROLOGY

## 2023-08-27 PROCEDURE — 99024 POSTOP FOLLOW-UP VISIT: CPT | Performed by: UROLOGY

## 2023-08-27 PROCEDURE — 63710000001 CLOPIDOGREL 75 MG TABLET: Performed by: FAMILY MEDICINE

## 2023-08-27 PROCEDURE — C1769 GUIDE WIRE: HCPCS | Performed by: INTERNAL MEDICINE

## 2023-08-27 PROCEDURE — 82553 CREATINE MB FRACTION: CPT | Performed by: FAMILY MEDICINE

## 2023-08-27 PROCEDURE — 63710000001 INSULIN LISPRO (HUMAN) PER 5 UNITS: Performed by: UROLOGY

## 2023-08-27 RX ORDER — LIDOCAINE HYDROCHLORIDE 20 MG/ML
INJECTION, SOLUTION INFILTRATION; PERINEURAL
Status: DISCONTINUED | OUTPATIENT
Start: 2023-08-27 | End: 2023-08-27 | Stop reason: HOSPADM

## 2023-08-27 RX ORDER — NITROGLYCERIN 0.4 MG/1
TABLET SUBLINGUAL
Status: DISPENSED
Start: 2023-08-27 | End: 2023-08-27

## 2023-08-27 RX ORDER — ALUMINA, MAGNESIA, AND SIMETHICONE 2400; 2400; 240 MG/30ML; MG/30ML; MG/30ML
15 SUSPENSION ORAL ONCE
Status: COMPLETED | OUTPATIENT
Start: 2023-08-27 | End: 2023-08-27

## 2023-08-27 RX ORDER — NITROGLYCERIN 0.4 MG/1
0.4 TABLET SUBLINGUAL
Status: DISCONTINUED | OUTPATIENT
Start: 2023-08-27 | End: 2023-08-30 | Stop reason: HOSPADM

## 2023-08-27 RX ORDER — CLOPIDOGREL BISULFATE 75 MG/1
75 TABLET ORAL DAILY
Status: DISCONTINUED | OUTPATIENT
Start: 2023-08-27 | End: 2023-08-30 | Stop reason: HOSPADM

## 2023-08-27 RX ORDER — ASPIRIN 81 MG/1
81 TABLET ORAL DAILY
Status: DISCONTINUED | OUTPATIENT
Start: 2023-08-28 | End: 2023-08-30 | Stop reason: HOSPADM

## 2023-08-27 RX ORDER — ACETAMINOPHEN 325 MG/1
650 TABLET ORAL EVERY 4 HOURS PRN
Status: DISCONTINUED | OUTPATIENT
Start: 2023-08-27 | End: 2023-08-30 | Stop reason: HOSPADM

## 2023-08-27 RX ORDER — NITROGLYCERIN 0.4 MG/1
0.4 TABLET SUBLINGUAL
Status: CANCELLED | OUTPATIENT
Start: 2023-08-27

## 2023-08-27 RX ORDER — ASPIRIN 81 MG/1
324 TABLET, CHEWABLE ORAL ONCE
Status: COMPLETED | OUTPATIENT
Start: 2023-08-27 | End: 2023-08-27

## 2023-08-27 RX ADMIN — AMPICILLIN INJECTION 500 MG: 500 POWDER, FOR SOLUTION INTRAMUSCULAR; INTRAVENOUS at 14:30

## 2023-08-27 RX ADMIN — SODIUM CHLORIDE 250 ML: 9 INJECTION, SOLUTION INTRAVENOUS at 11:30

## 2023-08-27 RX ADMIN — NITROGLYCERIN 0.4 MG: 0.4 TABLET, ORALLY DISINTEGRATING SUBLINGUAL at 10:22

## 2023-08-27 RX ADMIN — CLOPIDOGREL BISULFATE 75 MG: 75 TABLET ORAL at 11:40

## 2023-08-27 RX ADMIN — ASPIRIN 324 MG: 81 TABLET, CHEWABLE ORAL at 11:41

## 2023-08-27 RX ADMIN — LEVOTHYROXINE SODIUM 75 MCG: 75 TABLET ORAL at 06:11

## 2023-08-27 RX ADMIN — NITROGLYCERIN 0.4 MG: 0.4 TABLET, ORALLY DISINTEGRATING SUBLINGUAL at 09:14

## 2023-08-27 RX ADMIN — SODIUM CHLORIDE 60 ML/HR: 9 INJECTION, SOLUTION INTRAVENOUS at 21:12

## 2023-08-27 RX ADMIN — INSULIN LISPRO 2 UNITS: 100 INJECTION, SOLUTION INTRAVENOUS; SUBCUTANEOUS at 11:44

## 2023-08-27 RX ADMIN — ROSUVASTATIN 20 MG: 20 TABLET, FILM COATED ORAL at 21:11

## 2023-08-27 RX ADMIN — INSULIN LISPRO 2 UNITS: 100 INJECTION, SOLUTION INTRAVENOUS; SUBCUTANEOUS at 21:39

## 2023-08-27 RX ADMIN — ALPRAZOLAM 0.25 MG: 0.25 TABLET ORAL at 11:30

## 2023-08-27 RX ADMIN — AMPICILLIN INJECTION 500 MG: 500 POWDER, FOR SOLUTION INTRAMUSCULAR; INTRAVENOUS at 21:11

## 2023-08-27 RX ADMIN — Medication 10 ML: at 08:35

## 2023-08-27 RX ADMIN — SODIUM CHLORIDE 250 ML: 9 INJECTION, SOLUTION INTRAVENOUS at 09:37

## 2023-08-27 RX ADMIN — AMPICILLIN INJECTION 500 MG: 500 POWDER, FOR SOLUTION INTRAMUSCULAR; INTRAVENOUS at 08:35

## 2023-08-27 RX ADMIN — NITROGLYCERIN 0.4 MG: 0.4 TABLET, ORALLY DISINTEGRATING SUBLINGUAL at 21:23

## 2023-08-27 RX ADMIN — BACITRACIN 0.9 G: 500 OINTMENT TOPICAL at 21:11

## 2023-08-27 RX ADMIN — BACITRACIN 0.9 G: 500 OINTMENT TOPICAL at 14:23

## 2023-08-27 RX ADMIN — AMPICILLIN INJECTION 500 MG: 500 POWDER, FOR SOLUTION INTRAMUSCULAR; INTRAVENOUS at 02:47

## 2023-08-27 RX ADMIN — BACITRACIN 0.9 G: 500 OINTMENT TOPICAL at 06:11

## 2023-08-27 RX ADMIN — ALUMINUM HYDROXIDE, MAGNESIUM HYDROXIDE, AND DIMETHICONE 15 ML: 400; 400; 40 SUSPENSION ORAL at 09:39

## 2023-08-27 NOTE — PROGRESS NOTES
[facility]   Urology Progress Note    Patient Name: Andrea Odom  : 1940  MRN: [id number]  Primary Care Physician:  Connie Knapp MD  Date of admission: 2023    Subjective   Subjective       Feeling better  Had good night per staff, rested    Objective   Objective     Vitals:   Temp:  [96.6 °F (35.9 °C)-97.8 °F (36.6 °C)] 97.4 °F (36.3 °C)  Heart Rate:  [74-91] 77  Resp:  [12-22] 16  BP: ()/(44-72) 116/58  Flow (L/min):  [0.5-4] 4  Physical Exam     Alert and oriented x3  No acute distress  Unlabored respirations, clear  Regular rate, no chest retractions  Nontender/nondistended   Catheter in place, low drip rate, clear  : Patent meatus; no bleeding      Result Review    Result Review:  I have personally reviewed the results from the time of this admission to 2023 07:32 EDT and agree with these findings:  [x]  Laboratory  []  Microbiology  []  Radiology  []  EKG/Telemetry   []  Cardiology/Vascular   []  Pathology  []  Old records  []  Other:      Assessment & Plan   Assessment / Plan     Brief Patient Summary:  Andrea Odom is a 83 y.o. male history of p.o. stenosis, BPH with LUTS status post meatotomy, TURP, 2023    Takeback for clot retention, left hydronephrosis status post cystoscopy, clot evacuation, left ureteral stent placement 2023    Active Hospital Problems:  Active Hospital Problems    Diagnosis     **BPH (benign prostatic hyperplasia)     S/P TURP (status post transurethral resection of prostate)     S/P TURP     Stricture of male urethra     Benign prostatic hyperplasia with urinary hesitancy     Gross hematuria     Hydronephrosis      Plan:   Labs reviewed creatinine to 1.30 from 1.56; hemoglobin 10.6 from 11.4 no evidence of active bleeding; WBC 8 from 11.55  Recheck in morning    Clinically improving    Titrate CBI as tolerated to off  Staff may hand irrigate as needed    Maintain left ureteral stent; plan to remove in a couple weeks as  outpatient    Bacitracin to tip of penis twice daily  Symptom control    Out of bed  P.o. as tolerated    Appreciate medical management per hospitalist service  Anticipate DC tomorrow if continues to do well and CBI titrated to off; questionable void trial versus home with catheter

## 2023-08-27 NOTE — ANESTHESIA POSTPROCEDURE EVALUATION
Patient: Andrea Odom    Procedure Summary       Date: 08/26/23 Room / Location: [facility] OR 07 / [facility] MAIN OR    Anesthesia Start: 1718 Anesthesia Stop: 1844    Procedures:       CYSTOSCOPY WITH CLOT EVACUATION, bladder fulguration      CYSTOSCOPY RETROGRADE PYELOGRAM AND STENT INSERTION, left (Left) Diagnosis:       S/P TURP      Gross hematuria      Hydronephrosis, unspecified hydronephrosis type      (S/P TURP [Z90.79])      (Gross hematuria [R31.0])      (Hydronephrosis, unspecified hydronephrosis type [N13.30])    Surgeons: Debbie Rosa MD Provider: Panchito Cabrales CRNA    Anesthesia Type: general, MAC ASA Status: 4            Anesthesia Type: general, MAC    Vitals  Vitals Value Taken Time   /52 08/26/23 1923   Temp 36 øC (96.8 øF) 08/26/23 1915   Pulse 79 08/26/23 1923   Resp 15 08/26/23 1920   SpO2 97 % 08/26/23 1923   Vitals shown include unvalidated device data.        Post Anesthesia Care and Evaluation    Patient location during evaluation: bedside  Patient participation: complete - patient participated  Level of consciousness: awake  Pain management: adequate    Airway patency: patent  PONV Status: none  Cardiovascular status: acceptable and stable  Respiratory status: acceptable  Hydration status: acceptable    Comments: An Anesthesiologist personally participated in the most demanding procedures (including induction and emergence if applicable) in the anesthesia plan, monitored the course of anesthesia administration at frequent intervals and remained physically present and available for immediate diagnosis and treatment of emergencies.

## 2023-08-27 NOTE — PLAN OF CARE
Goal Outcome Evaluation: Pt rested well overnight. Pt's VSS. Pt's CBI running slow, urine clear yellow in color. Pt has had no reports of pain so far. Pt's blood glucose monitored as ordered. Pt alert and able to make needs known. Call light in reach and bed in lowest locked position. Pt has had no additional changes or complaints so far. Dary Davila RN

## 2023-08-27 NOTE — CONSULTS
Date of service: 8/27/2023    Reason for consultation: Chest pain, abnormal EKG and elevated troponin.    HPI: An 83-year-old male who is well-known to me and has a complicated cardiovascular history as will be stated below.  He underwent TURP and meatotomy on 8/25/2023.  He went back to the OR on 8/26/2023 for clot evacuation, etc.    Today, he had severe retrosternal chest pain with ST depression in the anterolateral leads.  In addition, HS opponent T was elevated.  Sublingual nitroglycerin x2 relieved his pain.  He has no dyspnea, orthopnea, palpitations, syncope or near syncope.  Dr. Dodie Kulkarni was initially consulted to see the patient.  He transferred his cardiac care to me a few minutes ago.  At this time, the patient is still having some residual chest pain as described above.    Review of systems: Negative for headaches, tinnitus, vertigo, nausea, vomiting, abdominal pain, fever, chills, TIA or CVA-like symptoms    Past medical and surgical history:    1.  Coronary artery disease status post CABG  2.  Left subclavian ending earlier this year and [facility]  3.  Chronic stenosis of the OM stent to a branch of the left circumflex.  4.  Carotid endarterectomy  5.  Hypertension  6.  DM type II  7.  Status post CVA  8.  Hypercholesterolemia  9.  Benign prostatic hypertrophy  10.  Anxiety and depression  11.  Hypothyroidism  12.  Shoulder surgery    Medications: See the patient's medication list    Allergies: Latex, penicillins, sulfa drugs and pantoprazole.    Social history: Never smoked, drank alcohol or used illicit drugs.    Family history: Noncontributory    Physical examination: He was in no respiratory distress.  Vital signs: Reviewed  HEENT: Sclerae: Nonicteric.  EOMI.  Neck: No JVD or carotid bruit.  Right carotid endarterectomy scar was noted.  Chest: CTA  Cardiac: RRR.  No S3 gallop  Abdomen: Soft and nontender.  No megalies or masses.  Extremities: No edema cyanosis or clubbing.  Pulse  intact  Neuro: Alert, oriented x3, no facial droop and speech was clear    Records: Reviewed    Assessment and plan:    1.  Acute non-STEMI  2.  CAD status post PCI and CABG  3.  Other medical problems as listed above.  4.  Reviewed his medical records and his previous cardiac catheterization images with Dr. Kulkarni.  5.  Diagnostic cardiac catheterization today.  Dr. Hairston cleared him for the procedure per Dr. Kulkarni.  Any further management will be based on the test results.

## 2023-08-27 NOTE — H&P
[facility]   HISTORY AND PHYSICAL    Patient Name:Andrea Odom  : 1940  MRN: [id number]  Primary Care Physician: Connie Knapp MD  Date of admission: 2023    Subjective   Subjective     Chief Complaint: Chest pain    History of Present Illness   83-year-old male with known CAD, status post previous CABG and multiple PCI's, status post recent left subclavian artery PTA, underwent TURP on 2023, has been having severe retrosternal chest discomfort with ST depression in anterolateral leads concerning for myocardial ischemia.  Chest discomfort improved with sublingual nitroglycerin.   Review of Systems    As above, otherwise 14 point review of system was unremarkable.  Personal History     Past Medical History:   Diagnosis Date    Anxiety     Arthritis     BPH (benign prostatic hyperplasia)     Coronary artery disease     HAD STENTS PLACED. FOLLOWED BY DR HANSON. DECREASED ACTIVITY USES CANE    Diabetes mellitus     Frequent UTI     GERD (gastroesophageal reflux disease)     Hyperlipidemia     Hypertension     Old MI (myocardial infarction)     SAID THINKS ABOUT 4-5 YEARS AGO    Stricture of male urethra     Stroke     SAYS ABOUT 4-5 YEARS AGO NO RESIDUAL       Past Surgical History:   Procedure Laterality Date    CARDIAC CATHETERIZATION      stent placed    CARDIAC CATHETERIZATION N/A 2022    Procedure: Left Heart Cath;  Surgeon: Allison Lazcano MD;  Location: [facility] CATH INVASIVE LOCATION;  Service: Cardiovascular;  Laterality: N/A;    CARDIAC CATHETERIZATION N/A 2022    Procedure: Possible Percutaneous Coronary Intervention;  Surgeon: Allison Lazcano MD;  Location: [facility] CATH INVASIVE LOCATION;  Service: Cardiovascular;  Laterality: N/A;    CARDIAC SURGERY      2 VESSEL CABG SAYS AROUND 35-40    CYSTOSCOPY TRANSURETHRAL RESECTION OF PROSTATE N/A 2023    Procedure: MEATOTOMY, cysotscopy, transurethral resection of prostate;  Surgeon: Debbie Rosa,  MD;  Location: [facility] MAIN OR;  Service: Urology;  Laterality: N/A;    SHOULDER SURGERY      RCR UNSURE OF SIDE       Family History: His family history is not on file.     Social History: He  reports that he has never smoked. He has never been exposed to tobacco smoke. He has never used smokeless tobacco. He reports that he does not drink alcohol and does not use drugs.    Home Medications:  Magnesium Oxide -Mg Supplement, amLODIPine, ampicillin, aspirin, cephalexin, clopidogrel, cyanocobalamin, glimepiride, levothyroxine, lisinopril, metFORMIN, nitroglycerin, omeprazole, and rosuvastatin    Allergies:  He is allergic to latex, natural rubber; pantoprazole; penicillins; and sulfa antibiotics.    Objective    Objective     Vitals:    Temp:  [96.6 °F (35.9 °C)-97.8 °F (36.6 °C)] 97.5 °F (36.4 °C)  Heart Rate:  [] 88  Resp:  [12-22] 16  BP: ()/(43-72) 141/58  Flow (L/min):  [2-4] 2  Output by Drain (mL) 08/26/23 0701 - 08/26/23 1900 08/26/23 1901 - 08/27/23 0700 08/27/23 0701 - 08/27/23 1656 Range Total   Urethral Catheter Non-latex 22 Fr.  3075  3075   Continuous Bladder Irrigation  -275  -275       Physical Exam   General: Laying in bed, no apparent distress  Neck: Supple  Heart: Regular rate rhythm, no rubs or gallops  Abdomen: Soft  Extremities: Well perfused without edema or cyanosis  Neuro: Alert and oriented x3, nonfocal  Result Review    Result Review:  I have personally reviewed the results from the time of this admission to 8/27/2023 16:56 EDT and agree with these findings:  []  Laboratory list / accordion  []  Microbiology  []  Radiology  []  EKG/Telemetry   []  Cardiology/Vascular   []  Pathology  []  Old records  []  Other:  Most notable findings include:       Assessment & Plan   Assessment / Plan     Brief Patient Summary:  Andrea Odom is a 83 y.o. male with:    Active Hospital Problems:  Active Hospital Problems    Diagnosis     **BPH (benign prostatic hyperplasia)     S/P TURP (status  post transurethral resection of prostate)     S/P TURP     Stricture of male urethra     Benign prostatic hyperplasia with urinary hesitancy     Gross hematuria     Hydronephrosis     NSTEMI (non-ST elevated myocardial infarction)     ACS (acute coronary syndrome)     Unstable angina      Plan:   We will proceed with left heart catheterization with possible intervention.  Continue medical therapy for non-STEMI  Further recommendations to follow    DVT prophylaxis:  Mechanical DVT prophylaxis orders are present.    Saúl Berry MD

## 2023-08-27 NOTE — PROGRESS NOTES
[facility]   Hospitalist Progress Note  Date: 2023  Patient Name: Andrea Odom  : 1940  MRN: [id number]  Date of admission: 2023      Subjective   Subjective     Chief complaint: Medical consult for blood pressure and diabetes    Summary:  83-year-old male with history of anxiety, depression, hypothyroidism, BPH with LUTS, aortic stenosis status post TAVR, coronary artery disease with history of CABG, stenting in the past year on aspirin and Plavix, diabetes, GERD without esophagitis, dyslipidemia, stroke, hospitalized after having TURP, has three-way catheter CBI after undergoing meatotomy, TURP, 2023.  Taken back to the operating room 2023 for clot evacuation, bladder fulguration, as well as left retrograde pyelogram and ureteral stent insertion.  Tolerated procedure well.  2023 chest pain, elevated high-sensitivity troponin initially, chest pain alleviated by nitro, EKG with ST depression, consulted cardiology    Interval follow-up: Seen and examined, rested well overnight, this morning complaining of chest pain, substernal, closer to the right side where he points, nonradiating, 10 out of 10, RRT called, given sublingual nitro, blood pressures dropped 90s over 40s, fluid bolus ordered, chest pain subsided after giving nitro, EKG with lateral lead ST depressions, as well as some further ST depressions in the anterior leads, troponin high-sensitivity at 79, second pending, resumed on aspirin and Plavix after discussing with urology, cardiology consulted given risk factors, post surgical procedures having chest pain with an elevated troponin.  Patient's urinary catheter shows no blood this morning, creatinine at 1.3, bicarb 17, potassium 5.0, sodium 131.  Hemoglobin 10.6.  Telemetry reviewed    Review of systems:  All systems reviewed and negative except for chest discomfort    Objective   Objective     Vitals:   Temp:  [96.6 °F (35.9 °C)-97.8 °F (36.6 °C)] 97.5 °F (36.4  °C)  Heart Rate:  [75-91] 82  Resp:  [12-22] 16  BP: ()/(43-72) 92/43  Flow (L/min):  [4] 4  Physical Exam               Constitutional: Awake, alert, no acute distress lying upright in the bed              Eyes: Pupils equal, sclerae anicteric, no conjunctival injection              HENT: NCAT, mucous membranes moist              Neck: Supple, full range of motion              Respiratory: Clear to auscultation bilaterally, nonlabored respirations               Cardiovascular: RRR, 2/6 systolic murmur, rubs, no gallops, palpable pedal pulses bilaterally              Gastrointestinal: Positive bowel sounds, soft, nontender, nondistended.  Catheter in place              Musculoskeletal: No bilateral ankle edema, no clubbing or cyanosis to extremities              Psychiatric: Appropriate affect, cooperative              Neurologic: Oriented x 3, strength symmetric in all extremities, Cranial Nerves grossly intact to confrontation, speech clear              Skin: No rashes   : Marroquin catheter in place with clear urine        Result Review    Result Review:  I have personally reviewed the pertinent results from the past 24 hours to 8/27/2023 11:17 EDT and agree with these findings:  [x]  Laboratory   CBC          8/19/2023    23:50 8/26/2023    04:25 8/26/2023    12:54 8/27/2023    04:23   CBC   WBC 6.92  11.35  11.55  8.00    RBC 4.21  3.63  3.54  3.40    Hemoglobin 13.1  11.3  11.4  10.6    Hematocrit 37.7  33.9  32.7  32.5    MCV 89.5  93.4  92.4  95.6    MCH 31.1  31.1  32.2  31.2    MCHC 34.7  33.3  34.9  32.6    RDW 13.0  12.9  13.1  13.2    Platelets 181  202  210  158      BMP          8/19/2023    23:50 8/26/2023    04:25 8/26/2023    12:54 8/27/2023    04:23   BMP   BUN 20  21  22  19    Creatinine 1.24  1.18  1.56  1.30    Sodium 129  125  127  131    Potassium 4.1  4.5  4.5  5.0    Chloride 93  92  93  104    CO2 23.1  22.6  24.7  17.2    Calcium 9.7  8.6  8.9  8.6    LIVER FUNCTION TESTS:      Lab  08/26/23  0425   TOTAL PROTEIN 5.9*   ALBUMIN 3.8   GLOBULIN 2.1   ALT (SGPT) 9   AST (SGOT) 19   BILIRUBIN 0.6   ALK PHOS 73         [x]  Microbiology No results found for: ACANTHNAEG, AFBCX, BPERTUSSISCX, BLOODCX  No results found for: BCIDPCR, CXREFLEX, CSFCX, CULTURETIS  No results found for: CULTURES, HSVCX, URCX  No results found for: EYECULTURE, GCCX, HSVCULTURE, LABHSV  No results found for: LEGIONELLA, MRSACX, MUMPSCX, MYCOPLASCX  No results found for: NOCARDIACX, STOOLCX  No results found for: THROATCX, UNSTIMCULT, URINECX, CULTURE, VZVCULTUR  No results found for: VIRALCULTU, WOUNDCX    [x]  Radiology XR Chest 2 View    Result Date: 8/18/2023  PROCEDURE: XR CHEST 2 VW  COMPARISON: [facility], , CHEST AP/PA 1 VIEW, 8/15/2020, 15:01.  INDICATIONS: preop, no chest complaints  FINDINGS:  There are postoperative changes of CABG and TAVR.  The cardiac size is normal.  The lungs appear clear.        1. Status post CABG and TAVR 2. No active disease     Chilo Nicole MD       Electronically Signed and Approved By: Chilo Nicole MD on 8/18/2023 at 15:38             XR Chest 1 View    Result Date: 8/26/2023  PROCEDURE: XR CHEST 1 VW  COMPARISON: 8/19/2023.  INDICATIONS: shortness of air/breath; +choking sensation  FINDINGS:  A single AP (or PA) upright portable chest radiograph was performed.  No cardiac enlargement is seen.  No acute infiltrate is appreciated.  No pleural effusion or pneumothorax is identified.  The patient has undergone median sternotomy and suspected CABG surgery.  There are also postoperative changes of the left shoulder.  The thoracic aorta is atherosclerotic.  No significant interval change is seen since the prior study (or studies).        No acute infiltrate is appreciated.     Please note that portions of this note were completed with a voice recognition program.  FÁTIMA HERNANDEZ JR, MD       Electronically Signed and Approved By: FÁTIMA HERNANDEZ JR, MD on 8/26/2023 at 2:13               XR Chest 1 View    Result Date: 8/19/2023  PROCEDURE: XR CHEST 1 VW  COMPARISON: [facility], CR, XR CHEST 2 VW, 8/18/2023, 15:22.  INDICATIONS: COUGH, HYPERTENSION  FINDINGS:  LUNGS: Normal.  No significant pulmonary parenchymal abnormalities.  VASCULATURE: Normal.  Unremarkable pulmonary vasculature.  CARDIAC: Normal.  No cardiac silhouette abnormality or cardiomegaly.  MEDIASTINUM: Normal.  No visible mass or adenopathy.  PLEURA: Normal.  No effusion or pleural thickening.  BONES: Normal.  No fracture or visible bony lesion.  OTHER: Median sternotomy wires appear intact.       No acute cardiopulmonary process identified.       MILEY BONDS MD       Electronically Signed and Approved By: MILEY BONDS MD on 8/19/2023 at 22:38               [x]  EKG/Telemetry   []  Cardiology/Vascular   []  Pathology  [x]  Old records  []  Other:    Assessment & Plan   Assessment / Plan     Assessment/Plan:    Assessment:  Diabetes mellitus  CAD status post stents and CABG  Concern for unstable angina  Left hydronephrosis status post stent placement  Elevated troponin concerning for NSTEMI  Essential hypertension  Dyslipidemia  History of aortic stenosis status post TAVR  Anxiety  Depression  Hypothyroidism  Bladder spasms  Status post meatotomy/TURP    Plan:  Labs and imaging reviewed  With underlying cardiac history and new symptoms of chest pain with EKG changes, need close cardiac monitoring  Aspirin 324 mg x 1  Aspirin 81 mg daily resumed  500 cc bolus of normal saline  Nitro sublingual for pain  Check CK and CK-MB  Resume Plavix 75 mg daily  Discussed with urology resuming Plavix, may potentiate urological bleeding, will follow  Follow-up echocardiogram  Continue telemetry monitoring  Repeat second troponin and EKG in 2 hours from first  Cardiology consulted discussed with Dr. Berry, will follow-up recommendations  Continue insulin sliding scale coverage  Xanax 0.25 mg 3 times daily as  needed for anxiety  Continue normal saline IV fluids at 60 cc/h  Continue levothyroxine 75 mcg daily  Given soft blood pressures, stopped Norvasc on this hospitalization and monitor  Continue Crestor 20 mg nightly  Pain control with morphine as needed for breakthrough pain  Pain control with Norco for moderate-severe pain as ordered  A.m. labs  Full code  DVT prophylaxis with SCDs  Clinical course dictate further management with current change in treatment course with patient now having chest pain symptoms  Discussed with nurse at the bedside  We will continue to follow while in the hospital    DVT prophylaxis:  Mechanical DVT prophylaxis orders are present.    CODE STATUS:   Code Status (Patient has no pulse and is not breathing): CPR (Attempt to Resuscitate)  Medical Interventions (Patient has pulse or is breathing): Full    Electronically signed by Gina Hensley MD, 08/27/23, 11:35 AM EDT.    Portions of this documentation were transcribed electronically from a voice recognition software.  I confirm all data accurately represents the service(s) I performed at today's visit.

## 2023-08-27 NOTE — PLAN OF CARE
Goal Outcome Evaluation:  Plan of Care Reviewed With: patient        Patient currently cath lab. Chest pain reported early in shift, rrt called. Providers notified, see orders. CBI has been titrated ranging from slow to moderate rate during shift.

## 2023-08-28 ENCOUNTER — APPOINTMENT (OUTPATIENT)
Dept: CARDIOLOGY | Facility: HOSPITAL | Age: 83
DRG: 659 | End: 2023-08-28
Payer: MEDICARE

## 2023-08-28 LAB
ALBUMIN SERPL-MCNC: 3.4 G/DL (ref 3.5–5.2)
ALP SERPL-CCNC: 67 U/L (ref 39–117)
ALT SERPL W P-5'-P-CCNC: 12 U/L (ref 1–41)
ANION GAP SERPL CALCULATED.3IONS-SCNC: 9.8 MMOL/L (ref 5–15)
AST SERPL-CCNC: 27 U/L (ref 1–40)
BASOPHILS # BLD AUTO: 0.03 10*3/MM3 (ref 0–0.2)
BASOPHILS NFR BLD AUTO: 0.5 % (ref 0–1.5)
BILIRUB CONJ SERPL-MCNC: <0.2 MG/DL (ref 0–0.3)
BILIRUB INDIRECT SERPL-MCNC: ABNORMAL MG/DL
BILIRUB SERPL-MCNC: 0.2 MG/DL (ref 0–1.2)
BUN SERPL-MCNC: 14 MG/DL (ref 8–23)
BUN/CREAT SERPL: 12.5 (ref 7–25)
CALCIUM SPEC-SCNC: 8.4 MG/DL (ref 8.6–10.5)
CHLORIDE SERPL-SCNC: 106 MMOL/L (ref 98–107)
CHOLEST SERPL-MCNC: 119 MG/DL (ref 0–200)
CO2 SERPL-SCNC: 21.2 MMOL/L (ref 22–29)
CREAT SERPL-MCNC: 1.12 MG/DL (ref 0.76–1.27)
DEPRECATED RDW RBC AUTO: 48.6 FL (ref 37–54)
EGFRCR SERPLBLD CKD-EPI 2021: 65.2 ML/MIN/1.73
EOSINOPHIL # BLD AUTO: 0.21 10*3/MM3 (ref 0–0.4)
EOSINOPHIL NFR BLD AUTO: 3.2 % (ref 0.3–6.2)
ERYTHROCYTE [DISTWIDTH] IN BLOOD BY AUTOMATED COUNT: 13.6 % (ref 12.3–15.4)
GLUCOSE BLDC GLUCOMTR-MCNC: 111 MG/DL (ref 70–99)
GLUCOSE BLDC GLUCOMTR-MCNC: 136 MG/DL (ref 70–99)
GLUCOSE BLDC GLUCOMTR-MCNC: 141 MG/DL (ref 70–99)
GLUCOSE BLDC GLUCOMTR-MCNC: 66 MG/DL (ref 70–99)
GLUCOSE BLDC GLUCOMTR-MCNC: 99 MG/DL (ref 70–99)
GLUCOSE SERPL-MCNC: 82 MG/DL (ref 65–99)
HBA1C MFR BLD: 5.7 % (ref 4.8–5.6)
HCT VFR BLD AUTO: 28.3 % (ref 37.5–51)
HDLC SERPL-MCNC: 46 MG/DL (ref 40–60)
HGB BLD-MCNC: 9.2 G/DL (ref 13–17.7)
IMM GRANULOCYTES # BLD AUTO: 0.01 10*3/MM3 (ref 0–0.05)
IMM GRANULOCYTES NFR BLD AUTO: 0.2 % (ref 0–0.5)
LDLC SERPL CALC-MCNC: 57 MG/DL (ref 0–100)
LDLC/HDLC SERPL: 1.23 {RATIO}
LYMPHOCYTES # BLD AUTO: 1.55 10*3/MM3 (ref 0.7–3.1)
LYMPHOCYTES NFR BLD AUTO: 23.9 % (ref 19.6–45.3)
MAGNESIUM SERPL-MCNC: 2.3 MG/DL (ref 1.6–2.4)
MCH RBC QN AUTO: 31.5 PG (ref 26.6–33)
MCHC RBC AUTO-ENTMCNC: 32.5 G/DL (ref 31.5–35.7)
MCV RBC AUTO: 96.9 FL (ref 79–97)
MONOCYTES # BLD AUTO: 0.66 10*3/MM3 (ref 0.1–0.9)
MONOCYTES NFR BLD AUTO: 10.2 % (ref 5–12)
NEUTROPHILS NFR BLD AUTO: 4.03 10*3/MM3 (ref 1.7–7)
NEUTROPHILS NFR BLD AUTO: 62 % (ref 42.7–76)
NRBC BLD AUTO-RTO: 0 /100 WBC (ref 0–0.2)
PHOSPHATE SERPL-MCNC: 2.2 MG/DL (ref 2.5–4.5)
PLATELET # BLD AUTO: 158 10*3/MM3 (ref 140–450)
PMV BLD AUTO: 10.4 FL (ref 6–12)
POTASSIUM SERPL-SCNC: 4.1 MMOL/L (ref 3.5–5.2)
PROT SERPL-MCNC: 5.3 G/DL (ref 6–8.5)
QT INTERVAL: 335 MS
QT INTERVAL: 415 MS
QTC INTERVAL: 440 MS
QTC INTERVAL: 497 MS
RBC # BLD AUTO: 2.92 10*6/MM3 (ref 4.14–5.8)
SODIUM SERPL-SCNC: 137 MMOL/L (ref 136–145)
TRIGL SERPL-MCNC: 81 MG/DL (ref 0–150)
VLDLC SERPL-MCNC: 16 MG/DL (ref 5–40)
WBC NRBC COR # BLD: 6.49 10*3/MM3 (ref 3.4–10.8)

## 2023-08-28 PROCEDURE — 25010000002 AMPICILLIN PER 500 MG: Performed by: INTERNAL MEDICINE

## 2023-08-28 PROCEDURE — 94799 UNLISTED PULMONARY SVC/PX: CPT

## 2023-08-28 PROCEDURE — 83036 HEMOGLOBIN GLYCOSYLATED A1C: CPT | Performed by: INTERNAL MEDICINE

## 2023-08-28 PROCEDURE — 93306 TTE W/DOPPLER COMPLETE: CPT

## 2023-08-28 PROCEDURE — 83735 ASSAY OF MAGNESIUM: CPT | Performed by: INTERNAL MEDICINE

## 2023-08-28 PROCEDURE — 99024 POSTOP FOLLOW-UP VISIT: CPT | Performed by: UROLOGY

## 2023-08-28 PROCEDURE — 80048 BASIC METABOLIC PNL TOTAL CA: CPT | Performed by: INTERNAL MEDICINE

## 2023-08-28 PROCEDURE — 85025 COMPLETE CBC W/AUTO DIFF WBC: CPT | Performed by: INTERNAL MEDICINE

## 2023-08-28 PROCEDURE — 84100 ASSAY OF PHOSPHORUS: CPT | Performed by: INTERNAL MEDICINE

## 2023-08-28 PROCEDURE — 80061 LIPID PANEL: CPT | Performed by: INTERNAL MEDICINE

## 2023-08-28 PROCEDURE — 82948 REAGENT STRIP/BLOOD GLUCOSE: CPT

## 2023-08-28 PROCEDURE — 97165 OT EVAL LOW COMPLEX 30 MIN: CPT

## 2023-08-28 PROCEDURE — 80076 HEPATIC FUNCTION PANEL: CPT | Performed by: INTERNAL MEDICINE

## 2023-08-28 PROCEDURE — 94761 N-INVAS EAR/PLS OXIMETRY MLT: CPT

## 2023-08-28 RX ORDER — AMLODIPINE BESYLATE 5 MG/1
5 TABLET ORAL
Status: DISCONTINUED | OUTPATIENT
Start: 2023-08-28 | End: 2023-08-30 | Stop reason: HOSPADM

## 2023-08-28 RX ORDER — ISOSORBIDE MONONITRATE 30 MG/1
30 TABLET, EXTENDED RELEASE ORAL
Status: DISCONTINUED | OUTPATIENT
Start: 2023-08-28 | End: 2023-08-30 | Stop reason: HOSPADM

## 2023-08-28 RX ORDER — SODIUM PHOSPHATE IN D5W 15MMOL/250
15 PLASTIC BAG, INJECTION (ML) INTRAVENOUS ONCE
Status: COMPLETED | OUTPATIENT
Start: 2023-08-28 | End: 2023-08-28

## 2023-08-28 RX ADMIN — NITROGLYCERIN 0.4 MG: 0.4 TABLET, ORALLY DISINTEGRATING SUBLINGUAL at 08:19

## 2023-08-28 RX ADMIN — ISOSORBIDE MONONITRATE 30 MG: 30 TABLET, EXTENDED RELEASE ORAL at 12:51

## 2023-08-28 RX ADMIN — ROSUVASTATIN 20 MG: 20 TABLET, FILM COATED ORAL at 20:21

## 2023-08-28 RX ADMIN — CALCIUM CARBONATE 1 TABLET: 500 TABLET, CHEWABLE ORAL at 01:37

## 2023-08-28 RX ADMIN — AMPICILLIN INJECTION 500 MG: 500 POWDER, FOR SOLUTION INTRAMUSCULAR; INTRAVENOUS at 04:24

## 2023-08-28 RX ADMIN — NITROGLYCERIN 0.4 MG: 0.4 TABLET, ORALLY DISINTEGRATING SUBLINGUAL at 23:59

## 2023-08-28 RX ADMIN — SODIUM PHOSPHATE, MONOBASIC, MONOHYDRATE AND SODIUM PHOSPHATE, DIBASIC, ANHYDROUS 15 MMOL: 142; 276 INJECTION, SOLUTION INTRAVENOUS at 10:20

## 2023-08-28 RX ADMIN — AMPICILLIN INJECTION 500 MG: 500 POWDER, FOR SOLUTION INTRAMUSCULAR; INTRAVENOUS at 21:10

## 2023-08-28 RX ADMIN — LEVOTHYROXINE SODIUM 75 MCG: 75 TABLET ORAL at 07:54

## 2023-08-28 RX ADMIN — CLOPIDOGREL BISULFATE 75 MG: 75 TABLET ORAL at 08:58

## 2023-08-28 RX ADMIN — ALPRAZOLAM 0.25 MG: 0.25 TABLET ORAL at 12:53

## 2023-08-28 RX ADMIN — ASPIRIN 81 MG: 81 TABLET, COATED ORAL at 08:58

## 2023-08-28 RX ADMIN — BACITRACIN 0.9 G: 500 OINTMENT TOPICAL at 07:55

## 2023-08-28 RX ADMIN — BACITRACIN 0.9 G: 500 OINTMENT TOPICAL at 20:23

## 2023-08-28 RX ADMIN — Medication 10 ML: at 08:58

## 2023-08-28 RX ADMIN — BACITRACIN 0.9 G: 500 OINTMENT TOPICAL at 15:38

## 2023-08-28 RX ADMIN — AMLODIPINE BESYLATE 5 MG: 5 TABLET ORAL at 12:51

## 2023-08-28 RX ADMIN — NITROGLYCERIN 0.4 MG: 0.4 TABLET, ORALLY DISINTEGRATING SUBLINGUAL at 23:49

## 2023-08-28 RX ADMIN — ALPRAZOLAM 0.25 MG: 0.25 TABLET ORAL at 23:52

## 2023-08-28 RX ADMIN — AMPICILLIN INJECTION 500 MG: 500 POWDER, FOR SOLUTION INTRAMUSCULAR; INTRAVENOUS at 08:58

## 2023-08-28 RX ADMIN — SODIUM CHLORIDE 60 ML/HR: 9 INJECTION, SOLUTION INTRAVENOUS at 15:40

## 2023-08-28 RX ADMIN — AMPICILLIN INJECTION 500 MG: 500 POWDER, FOR SOLUTION INTRAMUSCULAR; INTRAVENOUS at 15:38

## 2023-08-28 NOTE — PROGRESS NOTES
[facility]   Urology Progress Note    Patient Name: Andrea Odom  : 1940  MRN: [id number]  Primary Care Physician:  Connie Knapp MD  Date of admission: 2023    Subjective   Subjective       Denies chest pain; states cardiac cath was negative; CBI slow and no issues    Objective   Objective     Vitals:   Temp:  [97.4 °F (36.3 °C)-98 °F (36.7 °C)] 98 °F (36.7 °C)  Heart Rate:  [] 65  Resp:  [16-21] 16  BP: ()/(43-61) 137/48  Flow (L/min):  [2] 2  Physical Exam     Alert and oriented x3  No acute distress  Unlabored respirations, clear  Regular rate, no chest retractions  Nontender/nondistended   Catheter in place, low drip rate, clear  : Patent meatus; no bleeding      Result Review    Result Review:  I have personally reviewed the results from the time of this admission to 2023 07:02 EDT and agree with these findings:  [x]  Laboratory  []  Microbiology  []  Radiology  []  EKG/Telemetry   []  Cardiology/Vascular   []  Pathology  []  Old records  []  Other:      Assessment & Plan   Assessment / Plan     Brief Patient Summary:  Andrea Odom is a 83 y.o. male history of p.o. stenosis, BPH with LUTS status post meatotomy, TURP, 2023    Takeback for clot retention, left hydronephrosis status post cystoscopy, clot evacuation, left ureteral stent placement 2023    S/p cardiac cath     Active Hospital Problems:  Active Hospital Problems    Diagnosis     **BPH (benign prostatic hyperplasia)     S/P TURP (status post transurethral resection of prostate)     S/P TURP     Stricture of male urethra     Benign prostatic hyperplasia with urinary hesitancy     Gross hematuria     Hydronephrosis     NSTEMI (non-ST elevated myocardial infarction)     ACS (acute coronary syndrome)     Unstable angina      Plan:   Labs reviewed stable  Drip off  Void trial; obtain PVR to ensure emptying      Maintain left ureteral stent; plan to remove in a couple weeks as outpatient    Bacitracin  to tip of penis twice daily    Symptom control    Out of bed  P.o. as tolerated    Okay for discharge home with or without catheter depending upon void trial once appropriate per primary service    Urology discharge instructions and orders placed under discharge tab;     I am going off service Tuesday/Wednesday; will update on-call urologist of patient admission should there be questions or issues

## 2023-08-28 NOTE — PROGRESS NOTES
Date of service: 8/28/2023    Subjective: Had chest pain this morning.  Sublingual nitroglycerin did not relieve the pain.no chest pain at this time.  No SOB palpitations or dizziness    Review of systems: Negative for headache, vertigo, nausea, vomiting, abdominal pain, fever, chills, GI bleed, TIA or CVA like symptoms    Physical examination: He was in no respiratory distress.  Vital signs: Reviewed  HEENT: Sclerae: Nonicteric.  EOMI.  Neck: No JVD or carotid bruit.  Right carotid endarterectomy scar was noted.  Chest: CTA  Cardiac: RRR.  No S3 gallop  Abdomen: Soft and nontender.  No megalies or masses.  Extremities: No edema cyanosis or clubbing.  Pulse intact  Neuro: Alert, oriented x3, no facial droop and speech was clear    Records: Reviewed    Assessment and plan:    Acute non-STEMI  Noncardiac chest pain  3.  CAD status post PCI and CABG  4.  Other medical problems as listed above.  5.  Discussed with him the cardiac catheterization results. The left circumflex stent is patent.  He will be treated medically. We'll start him on isosorbide mononitrate 30 mg daily.  Continue the other cardiac medications.

## 2023-08-28 NOTE — PLAN OF CARE
Problem: Adult Inpatient Plan of Care  Goal: Plan of Care Review  Outcome: Ongoing, Progressing  Flowsheets (Taken 8/28/2023 3167)  Plan of Care Reviewed With: patient  Outcome Evaluation: Patient alert and oriented throughout shift. Patient on room air throughout shift with no signs of distress. Patient plan of care discussed at bedside. Patient CBI discontinued on shift. Patient chest pain treated per MAR. Will continue with patient plan of care.   Goal Outcome Evaluation:  Plan of Care Reviewed With: patient           Outcome Evaluation: Patient alert and oriented throughout shift. Patient on room air throughout shift with no signs of distress. Patient plan of care discussed at bedside. Patient CBI discontinued on shift. Patient chest pain treated per MAR. Will continue with patient plan of care.

## 2023-08-28 NOTE — SIGNIFICANT NOTE
08/28/23 1100   Plan   Plan QAMAR, RN met with patient at bedside.  Patient reports lives by himself.  Good support from son and ex-spouse.  Patient drives and provides own ADL's.  CM updated PCP to [facility] Le[facility].  Facesheet verified.  Reports no fianacial needs.  Patient plans to return home with no needs at this time.

## 2023-08-28 NOTE — PROGRESS NOTES
[facility]   Hospitalist Progress Note  Date: 2023  Patient Name: Andrea Odom  : 1940  MRN: [id number]  Date of admission: 2023      Subjective   Subjective     Chief complaint: Medical consult for blood pressure and diabetes    Summary:  83-year-old male with history of anxiety, depression, hypothyroidism, BPH with LUTS, aortic stenosis status post TAVR, coronary artery disease with history of CABG, stenting in the past year on aspirin and Plavix, diabetes, GERD without esophagitis, dyslipidemia, stroke, hospitalized after having TURP, has three-way catheter CBI after undergoing meatotomy, TURP, 2023.  Taken back to the operating room 2023 for clot evacuation, bladder fulguration, as well as left retrograde pyelogram and ureteral stent insertion.  Tolerated procedure well.  2023 chest pain, elevated high-sensitivity troponin initially, chest pain alleviated by nitro, EKG with ST depression, established NSTEMI diagnosis, consulted cardiology, status post cardiac cath, showing severe native CAD, occluded ostial LAD, LAD feels via patent LIMA graft, occluded previous OM 2 stent, patent left circumflex into OM1 stent, patent LIMA to LAD, advised continue medical optimization.  Patient transferred to hospitalist service after NSTEMI.    Interval follow-up: Seen and examined, status post cardiac cath yesterday, patient frustrated with nursing staff not meeting his needs.  Continues to have hematuria, with resumption of Plavix, remains on CBI.  He wants his amlodipine restarted.  Hemoglobin 9.2, bicarb 21, creatinine down to 1.12.  Blood sugars are hypoglycemic this morning down to the 60s.  He is not on any long-acting insulins.  Telemetry reviewed, normal sinus rhythm.  Continues to have intermittent chest pain/discomfort symptoms mostly on the right.  No fevers, chills, sweats.    Review of systems:  All systems reviewed and negative except for chest discomfort, anxiety, bladder  spasms, left-sided back pain      Objective   Objective     Vitals:   Temp:  [97.5 °F (36.4 °C)-98.1 °F (36.7 °C)] 98.1 °F (36.7 °C)  Heart Rate:  [65-88] 74  Resp:  [16] 16  BP: (106-141)/(44-58) 134/52  Flow (L/min):  [0-2] 0  Physical Exam                 Constitutional: Awake, alert, no acute distress lying upright in the bed              Eyes: Pupils equal, sclerae anicteric, no conjunctival injection              HENT: NCAT, mucous membranes moist              Neck: Supple, full range of motion              Respiratory: Clear to auscultation bilaterally, nonlabored respirations               Cardiovascular: RRR, 2/6 systolic murmur, rubs, no gallops, palpable pedal pulses bilaterally              Gastrointestinal: Positive bowel sounds, soft, nontender, nondistended.  Catheter in place              Musculoskeletal: No bilateral ankle edema, no clubbing or cyanosis to extremities              Psychiatric: Appropriate affect, cooperative, argumentative and anxious              Neurologic: Oriented x 3, strength symmetric in all extremities, Cranial Nerves grossly intact to confrontation, speech clear              Skin: No rashes   : Marroquin catheter in place with blood-tinged urine        Result Review    Result Review:  I have personally reviewed the pertinent results from the past 24 hours to 8/28/2023 11:33 EDT and agree with these findings:  [x]  Laboratory   CBC          8/26/2023    04:25 8/26/2023    12:54 8/27/2023    04:23 8/28/2023    04:08   CBC   WBC 11.35  11.55  8.00  6.49    RBC 3.63  3.54  3.40  2.92    Hemoglobin 11.3  11.4  10.6  9.2    Hematocrit 33.9  32.7  32.5  28.3    MCV 93.4  92.4  95.6  96.9    MCH 31.1  32.2  31.2  31.5    MCHC 33.3  34.9  32.6  32.5    RDW 12.9  13.1  13.2  13.6    Platelets 202  210  158  158      BMP          8/26/2023    04:25 8/26/2023    12:54 8/27/2023    04:23 8/28/2023    04:08   BMP   BUN 21  22  19  14    Creatinine 1.18  1.56  1.30  1.12    Sodium 125  127   131  137    Potassium 4.5  4.5  5.0  4.1    Chloride 92  93  104  106    CO2 22.6  24.7  17.2  21.2    Calcium 8.6  8.9  8.6  8.4    LIVER FUNCTION TESTS:      Lab 08/28/23  0408 08/26/23  0425   TOTAL PROTEIN 5.3* 5.9*   ALBUMIN 3.4* 3.8   GLOBULIN  --  2.1   ALT (SGPT) 12 9   AST (SGOT) 27 19   BILIRUBIN 0.2 0.6   BILIRUBIN DIRECT <0.2  --    ALK PHOS 67 73         [x]  Microbiology No results found for: ACANTHNAEG, AFBCX, BPERTUSSISCX, BLOODCX  No results found for: BCIDPCR, CXREFLEX, CSFCX, CULTURETIS  No results found for: CULTURES, HSVCX, URCX  No results found for: EYECULTURE, GCCX, HSVCULTURE, LABHSV  No results found for: LEGIONELLA, MRSACX, MUMPSCX, MYCOPLASCX  No results found for: NOCARDIACX, STOOLCX  No results found for: THROATCX, UNSTIMCULT, URINECX, CULTURE, VZVCULTUR  No results found for: VIRALCULTU, WOUNDCX    [x]  Radiology XR Chest 2 View    Result Date: 8/18/2023  PROCEDURE: XR CHEST 2 VW  COMPARISON: [facility], , CHEST AP/PA 1 VIEW, 8/15/2020, 15:01.  INDICATIONS: preop, no chest complaints  FINDINGS:  There are postoperative changes of CABG and TAVR.  The cardiac size is normal.  The lungs appear clear.        1. Status post CABG and TAVR 2. No active disease     Chilo Nicole MD       Electronically Signed and Approved By: Chilo Nicole MD on 8/18/2023 at 15:38             XR Chest 1 View    Result Date: 8/26/2023  PROCEDURE: XR CHEST 1 VW  COMPARISON: 8/19/2023.  INDICATIONS: shortness of air/breath; +choking sensation  FINDINGS:  A single AP (or PA) upright portable chest radiograph was performed.  No cardiac enlargement is seen.  No acute infiltrate is appreciated.  No pleural effusion or pneumothorax is identified.  The patient has undergone median sternotomy and suspected CABG surgery.  There are also postoperative changes of the left shoulder.  The thoracic aorta is atherosclerotic.  No significant interval change is seen since the prior study (or studies).        No  acute infiltrate is appreciated.     Please note that portions of this note were completed with a voice recognition program.  FÁTIMA HERNANDEZ JR, MD       Electronically Signed and Approved By: FÁTIMA HERNANDEZ JR, MD on 8/26/2023 at 2:13              XR Chest 1 View    Result Date: 8/19/2023  PROCEDURE: XR CHEST 1 VW  COMPARISON: [facility], CR, XR CHEST 2 VW, 8/18/2023, 15:22.  INDICATIONS: COUGH, HYPERTENSION  FINDINGS:  LUNGS: Normal.  No significant pulmonary parenchymal abnormalities.  VASCULATURE: Normal.  Unremarkable pulmonary vasculature.  CARDIAC: Normal.  No cardiac silhouette abnormality or cardiomegaly.  MEDIASTINUM: Normal.  No visible mass or adenopathy.  PLEURA: Normal.  No effusion or pleural thickening.  BONES: Normal.  No fracture or visible bony lesion.  OTHER: Median sternotomy wires appear intact.       No acute cardiopulmonary process identified.       MILEY BONDS MD       Electronically Signed and Approved By: MILEY BONDS MD on 8/19/2023 at 22:38               [x]  EKG/Telemetry   []  Cardiology/Vascular   []  Pathology  [x]  Old records  []  Other:    Assessment & Plan   Assessment / Plan     Assessment/Plan:      Assessment:  Diabetes mellitus  CAD status post stents and CABG  Unstable angina  Left hydronephrosis status post stent placement  Elevated troponin due to NSTEMI  Essential hypertension  Dyslipidemia  History of aortic stenosis status post TAVR  Anxiety  Depression  Hypothyroidism  Bladder spasms  Status post meatotomy/TUR    Plan:  Labs and imaging reviewed  Patient transferred to medical service  Resume amlodipine 5 mg daily at the request of the patient  Continuing CBI with ongoing hematuria  Aspirin 81 mg daily continue  Continue Plavix 75 mg daily  Follow-up echo  Continue telemetry monitoring  Cardiology consulted discussed with Dr. Berry, recommendations appreciated, signed out to Dr. Lucio Duron  Continue insulin sliding scale coverage  Xanax 0.25  mg 3 times daily as needed for anxiety  Continue normal saline IV fluids at 60 cc/h  Continue levothyroxine 75 mcg daily  Continue Crestor 20 mg nightly  Pain control with morphine as needed for breakthrough pain  Pain control with Norco for moderate-severe pain as ordered  A.m. labs  Full code  DVT prophylaxis with SCDs  Clinical course dictate further management  Discussed with nurse at the bedside        DVT prophylaxis:  Mechanical DVT prophylaxis orders are present.    CODE STATUS:   Code Status (Patient has no pulse and is not breathing): CPR (Attempt to Resuscitate)  Medical Interventions (Patient has pulse or is breathing): Full    Electronically signed by Gina Hensley MD, 08/28/23, 11:49 AM EDT.    Portions of this documentation were transcribed electronically from a voice recognition software.  I confirm all data accurately represents the service(s) I performed at today's visit.

## 2023-08-28 NOTE — PLAN OF CARE
Goal Outcome Evaluation:  Plan of Care Reviewed With: patient        Progress: no change  Outcome Evaluation: Patient presents with limitations in self-care, functional transfers, balance, and endurance. He would benefit from continued skilled occupational therapy services to maximize independence with ADLs/functional transfers.      Anticipated Discharge Disposition (OT): home with outpatient therapy services (Cardiac rehab)

## 2023-08-28 NOTE — THERAPY EVALUATION
Patient Name: Andrea Odom  : 1940    MRN: [id number]                              Today's Date: 2023       Admit Date: 2023    Visit Dx:     ICD-10-CM ICD-9-CM   1. S/P TURP  Z90.79 V45.89   2. Stricture of male urethra, unspecified stricture type  N35.919 598.9   3. Benign prostatic hyperplasia with urinary hesitancy  N40.1 600.01    R39.11 788.64   4. Gross hematuria  R31.0 599.71   5. Hydronephrosis, unspecified hydronephrosis type  N13.30 591   6. Unstable angina  I20.0 411.1   7. ACS (acute coronary syndrome)  I24.9 411.1   8. NSTEMI (non-ST elevated myocardial infarction)  I21.4 410.70   9. Decreased activities of daily living (ADL)  Z78.9 V49.89     Patient Active Problem List   Diagnosis    Acute chest pain    NSTEMI (non-ST elevated myocardial infarction)    ACS (acute coronary syndrome)    Anxiety    Aortic stenosis    Arteriosclerosis of coronary artery    B12 deficiency    Benign essential HTN    Bilateral carotid artery stenosis    Carotid artery disease    Diabetes mellitus type 2 in nonobese    Dyslipidemia    Epigastric pain    GERD (gastroesophageal reflux disease)    Mixed hyperlipidemia    Paralysis of glottis    Presence of aortocoronary bypass graft    Status post carotid endarterectomy    Thyroid disease    Unstable angina    Vertigo    Voice hoarseness    Acute cystitis with hematuria    Hyponatremia    Near syncope    Weakness    Stricture of male urethra    Benign prostatic hyperplasia with urinary hesitancy    BPH (benign prostatic hyperplasia)    S/P TURP (status post transurethral resection of prostate)    S/P TURP    Gross hematuria    Hydronephrosis     Past Medical History:   Diagnosis Date    Anxiety     Arthritis     BPH (benign prostatic hyperplasia)     Coronary artery disease     HAD STENTS PLACED. FOLLOWED BY DR HANSON. DECREASED ACTIVITY USES CANE    Diabetes mellitus     Frequent UTI     GERD (gastroesophageal reflux disease)     Hyperlipidemia      Hypertension     Old MI (myocardial infarction)     SAID THINKS ABOUT 4-5 YEARS AGO    Stricture of male urethra     Stroke     SAYS ABOUT 4-5 YEARS AGO NO RESIDUAL     Past Surgical History:   Procedure Laterality Date    CARDIAC CATHETERIZATION      stent placed    CARDIAC CATHETERIZATION N/A 08/11/2022    Procedure: Left Heart Cath;  Surgeon: Allison Lazcano MD;  Location: [facility] CATH INVASIVE LOCATION;  Service: Cardiovascular;  Laterality: N/A;    CARDIAC CATHETERIZATION N/A 08/11/2022    Procedure: Possible Percutaneous Coronary Intervention;  Surgeon: Allison Lazcano MD;  Location: [facility] CATH INVASIVE LOCATION;  Service: Cardiovascular;  Laterality: N/A;    CARDIAC SURGERY      2 VESSEL CABG SAYS AROUND 35-40    CYSTOSCOPY TRANSURETHRAL RESECTION OF PROSTATE N/A 8/25/2023    Procedure: MEATOTOMY, cysotscopy, transurethral resection of prostate;  Surgeon: Debbie Rosa MD;  Location: [facility] OR;  Service: Urology;  Laterality: N/A;    SHOULDER SURGERY      RCR UNSURE OF SIDE      General Information       Row Name 08/28/23 1325          OT Time and Intention    Document Type evaluation  -LF     Mode of Treatment individual therapy;occupational therapy  -LF       Row Name 08/28/23 1325          General Information    Patient Profile Reviewed yes  -LF     Prior Level of Function --  (I) with ADLs, ambulated with a HurryCane, has a step over tub but sponge bathes d/t fear of falling, elevated commode, stands to groom, drives, and no home O2.  -LF     Existing Precautions/Restrictions fall  -LF     Barriers to Rehab none identified  -       Row Name 08/28/23 1325          Occupational Profile    Reason for Services/Referral (Occupational Profile) Patient is an 83 year old male admitted to [facility] for chest pain on August 25th, 2023. Occupational therapy consulted due to recent decline in ADLs/functional transfers. No previous occupational therapy services for current  condition.  -       Row Name 08/28/23 1325          Living Environment    People in Home alone  Senior Apartment Complex  -       Row Name 08/28/23 1325          Home Main Entrance    Number of Stairs, Main Entrance none  -LF       Row Name 08/28/23 1325          Stairs Within Home, Primary    Number of Stairs, Within Home, Primary none  -       Row Name 08/28/23 1325          Cognition    Orientation Status (Cognition) oriented x 4  -       Row Name 08/28/23 1325          Safety Issues, Functional Mobility    Safety Issues Affecting Function (Mobility) ability to follow commands;awareness of need for assistance;impulsivity;insight into deficits/self-awareness  -     Impairments Affecting Function (Mobility) balance;endurance/activity tolerance  -               User Key  (r) = Recorded By, (t) = Taken By, (c) = Cosigned By      Initials Name Provider Type    LF Eva Antunez OT Occupational Therapist                     Mobility/ADL's       Row Name 08/28/23 1326          Bed Mobility    Bed Mobility supine-sit;sit-supine  -LF     Supine-Sit [place] (Bed Mobility) standby assist  -     Sit-Supine [place] (Bed Mobility) standby assist  -     Bed Mobility, Safety Issues decreased use of arms for pushing/pulling;decreased use of legs for bridging/pushing  -     Assistive Device (Bed Mobility) head of bed elevated;bed rails  -       Row Name 08/28/23 1326          Transfers    Transfers sit-stand transfer;stand-sit transfer  -       Row Name 08/28/23 1326          Sit-Stand Transfer    Sit-Stand [place] (Transfers) contact guard  -LF     Assistive Device (Sit-Stand Transfers) other (see comments)  HurryCane  -LF       Row Name 08/28/23 1326          Stand-Sit Transfer    Stand-Sit [place] (Transfers) contact guard  -LF     Assistive Device (Stand-Sit Transfers) other (see comments)  HurryCane  -LF       Row Name 08/28/23 1326          Activities of Daily Living    BADL  Assessment/Intervention bathing;upper body dressing;lower body dressing;grooming;feeding;toileting  -[facility] Name 08/28/23 1326          Bathing Assessment/Intervention    [place] Level (Bathing) bathing skills;upper body;set up;lower body;minimum assist (75% patient effort)  -[facility] Name 08/28/23 1326          Upper Body Dressing Assessment/Training    [place] Level (Upper Body Dressing) upper body dressing skills;set up  -[facility] Name 08/28/23 1326          Lower Body Dressing Assessment/Training    [place] Level (Lower Body Dressing) lower body dressing skills;minimum assist (75% patient effort)  -[facility] Name 08/28/23 1326          Grooming Assessment/Training    [place] Level (Grooming) grooming skills;set up  -[facility] Name 08/28/23 1326          Self-Feeding Assessment/Training    [place] Level (Feeding) feeding skills;set up  -[facility] Name 08/28/23 1326          Toileting Assessment/Training    Comment, (Toileting) Marroquin catheter currently in place  -               User Key  (r) = Recorded By, (t) = Taken By, (c) = Cosigned By      Initials Name Provider Type     Eva Antunez OT Occupational Therapist                   Obj/Interventions       [facility] Name 08/28/23 1328          Sensory Assessment (Somatosensory)    Sensory Assessment (Somatosensory) UE sensation intact  -LF       Row Name 08/28/23 1328          Vision Assessment/Intervention    Visual Impairment/Limitations WFL  -LF       Row Name 08/28/23 1328          Range of Motion Comprehensive    General Range of Motion bilateral upper extremity ROM [facility] Name 08/28/23 1328          Strength Comprehensive (MMT)    Comment, General Manual Muscle Testing (MMT) Assessment 5/5 BUEs  -[facility] Name 08/28/23 1328          Motor Skills    Motor Skills coordination;functional endurance  -     Coordination WFL  -     Functional Endurance Fair-/Fair  -[facility] Name 08/28/23 1328           Balance    Balance Assessment sitting dynamic balance;standing dynamic balance  -LF     Dynamic Sitting Balance supervision  -LF     Position, Sitting Balance unsupported;sitting edge of bed  -LF     Dynamic Standing Balance contact guard  -LF     Position/Device Used, Standing Balance supported;other (see comments)  HurryCane  -LF               User Key  (r) = Recorded By, (t) = Taken By, (c) = Cosigned By      Initials Name Provider Type     Eva Antunez, OT Occupational Therapist                   Goals/Plan       Row Name 08/28/23 1330          Bed Mobility Goal 1 (OT)    Activity/Assistive Device (Bed Mobility Goal 1, OT) bed mobility activities, all  -LF     [place] Level/Cues Needed (Bed Mobility Goal 1, OT) modified independence  -LF     Time Frame (Bed Mobility Goal 1, OT) long term goal (LTG);10 days  -LF       Row Name 08/28/23 1330          Transfer Goal 1 (OT)    Activity/Assistive Device (Transfer Goal 1, OT) transfers, all  -LF     [place] Level/Cues Needed (Transfer Goal 1, OT) modified independence  -LF     Time Frame (Transfer Goal 1, OT) long term goal (LTG);10 days  -LF       Row Name 08/28/23 1330          Bathing Goal 1 (OT)    Activity/Device (Bathing Goal 1, OT) bathing skills, all  -LF     [place] Level/Cues Needed (Bathing Goal 1, OT) modified independence  -LF     Time Frame (Bathing Goal 1, OT) long term goal (LTG);10 days  -LF       Row Name 08/28/23 1330          Dressing Goal 1 (OT)    Activity/Device (Dressing Goal 1, OT) dressing skills, all  -LF     [place]/Cues Needed (Dressing Goal 1, OT) modified independence  -LF     Time Frame (Dressing Goal 1, OT) long term goal (LTG);10 days  -LF       Row Name 08/28/23 1330          Toileting Goal 1 (OT)    Activity/Device (Toileting Goal 1, OT) toileting skills, all  -LF     [place] Level/Cues Needed (Toileting Goal 1, OT) modified independence  -LF     Time Frame (Toileting Goal 1, OT) long term goal  (LTG);10 days  -       Row Name 08/28/23 7180          Problem Specific Goal 1 (OT)    Problem Specific Goal 1 (OT) Patient will demonstrate good endurance to support ADLs/functional transfers.  -     Time Frame (Problem Specific Goal 1, OT) long term goal (LTG);10 days  -       Row Name 08/28/23 4070          Therapy Assessment/Plan (OT)    Planned Therapy Interventions (OT) activity tolerance training;patient/caregiver education/training;BADL retraining;functional balance retraining;occupation/activity based interventions;strengthening exercise;transfer/mobility retraining  -               User Key  (r) = Recorded By, (t) = Taken By, (c) = Cosigned By      Initials Name Provider Type     Eva Antunez OT Occupational Therapist                   Clinical Impression       Row Name 08/28/23 0473          Pain Assessment    Additional Documentation Pain Scale: FACES Pre/Post-Treatment (Group)  -       Row Name 08/28/23 9236          Pain Scale: FACES Pre/Post-Treatment    Pain: FACES Scale, Pretreatment 2-->hurts little bit  -LF     Posttreatment Pain Rating 2-->hurts little bit  -LF     Pain Location generalized  -       Row Name 08/28/23 132          Plan of Care Review    Plan of Care Reviewed With patient  -     Progress no change  -     Outcome Evaluation Patient presents with limitations in self-care, functional transfers, balance, and endurance. He would benefit from continued skilled occupational therapy services to maximize independence with ADLs/functional transfers.  -       Row Name 08/28/23 7364          Therapy Assessment/Plan (OT)    Patient/Family Therapy Goal Statement (OT) To maximize independence.  -     Rehab Potential (OT) good, to achieve stated therapy goals  -     Criteria for Skilled Therapeutic Interventions Met (OT) yes;meets criteria;skilled treatment is necessary  -     Therapy Frequency (OT) 5 times/wk  -       Row Name 08/28/23 4916          Therapy Plan  Review/Discharge Plan (OT)    Anticipated Discharge Disposition (OT) home with outpatient therapy services  Cardiac rehab  -LF       Row Name 08/28/23 1329          Vital Signs    O2 Delivery Pre Treatment room air  -LF     O2 Delivery Intra Treatment room air  -LF     O2 Delivery Post Treatment room air  -LF               User Key  (r) = Recorded By, (t) = Taken By, (c) = Cosigned By      Initials Name Provider Type    LF Eva Antunez, SHANNAN Occupational Therapist                   Outcome Measures       Row Name 08/28/23 1330          How much help from another is currently needed...    Putting on and taking off regular lower body clothing? 3  -LF     Bathing (including washing, rinsing, and drying) 3  -LF     Toileting (which includes using toilet bed pan or urinal) 3  -LF     Putting on and taking off regular upper body clothing 4  -LF     Taking care of personal grooming (such as brushing teeth) 4  -LF     Eating meals 4  -LF     AM-PAC 6 Clicks Score (OT) 21  -LF       Row Name 08/28/23 0800          How much help from another person do you currently need...    Turning from your back to your side while in flat bed without using bedrails? 4  -TA     Moving from lying on back to sitting on the side of a flat bed without bedrails? 4  -TA     Moving to and from a bed to a chair (including a wheelchair)? 3  -TA     Standing up from a chair using your arms (e.g., wheelchair, bedside chair)? 3  -TA     Climbing 3-5 steps with a railing? 2  -TA     To walk in hospital room? 2  -TA     AM-PAC 6 Clicks Score (PT) 18  -TA     Highest level of mobility 6 --> Walked 10 steps or more  -TA       Row Name 08/28/23 1330          Functional Assessment    Outcome Measure Options AM-PAC 6 Clicks Daily Activity (OT);Optimal Instrument  -LF       Row Name 08/28/23 1330          Optimal Instrument    Optimal Instrument Optimal - 3  -LF     Bending/Stooping 2  -LF     Standing 2  -LF     Reaching 1  -LF     From the list, choose the  3 activities you would most like to be able to do without any difficulty Bending/stooping;Standing;Reaching  -     Total Score Optimal - 3 5  -LF               User Key  (r) = Recorded By, (t) = Taken By, (c) = Cosigned By      Initials Name Provider Type     Eva Antunez OT Occupational Therapist    Amber Hernandez, RN Registered Nurse                    Occupational Therapy Education       Title: PT OT SLP Therapies (In Progress)       Topic: Occupational Therapy (In Progress)       Point: ADL training (In Progress)       Description:   Instruct learner(s) on proper safety adaptation and remediation techniques during self care or transfers.   Instruct in proper use of assistive devices.                  Learning Progress Summary             Patient Acceptance, E,TB, NR by  at 8/28/2023 1331                         Point: Precautions (In Progress)       Description:   Instruct learner(s) on prescribed precautions during self-care and functional transfers.                  Learning Progress Summary             Patient Acceptance, E,TB, NR by  at 8/28/2023 1331                         Point: Body mechanics (In Progress)       Description:   Instruct learner(s) on proper positioning and spine alignment during self-care, functional mobility activities and/or exercises.                  Learning Progress Summary             Patient Acceptance, E,TB, NR by  at 8/28/2023 1331                                         User Key       Initials Effective Dates Name Provider Type [facility] 06/16/21 -  Eva Antunez OT Occupational Therapist OT                  OT Recommendation and Plan  Planned Therapy Interventions (OT): activity tolerance training, patient/caregiver education/training, BADL retraining, functional balance retraining, occupation/activity based interventions, strengthening exercise, transfer/mobility retraining  Therapy Frequency (OT): 5 times/wk  Plan of Care Review  Plan of Care  Reviewed With: patient  Progress: no change  Outcome Evaluation: Patient presents with limitations in self-care, functional transfers, balance, and endurance. He would benefit from continued skilled occupational therapy services to maximize independence with ADLs/functional transfers.     Time Calculation:   Evaluation Complexity (OT)  Review Occupational Profile/Medical/Therapy History Complexity: brief/low complexity  Assessment, Occupational Performance/Identification of Deficit Complexity: 3-5 performance deficits  Clinical Decision Making Complexity (OT): problem focused assessment/low complexity  Overall Complexity of Evaluation (OT): low complexity     Time Calculation- OT       Row Name 08/28/23 1331             Time Calculation- OT    OT Received On 08/28/23  -LF      OT Goal Re-Cert Due Date 09/06/23  -LF         Untimed Charges    OT Eval/Re-eval Minutes 35  -LF         Total Minutes    Untimed Charges Total Minutes 35  -LF       Total Minutes 35  -LF                User Key  (r) = Recorded By, (t) = Taken By, (c) = Cosigned By      Initials Name Provider Type    LF Eva Antunez OT Occupational Therapist                  Therapy Charges for Today       Code Description Service Date Service Provider Modifiers Qty    [id number]  OT EVAL LOW COMPLEXITY 3 8/28/2023 Eva Antunez OT GO 1                 Eva Antunez OT  8/28/2023

## 2023-08-29 LAB
ALBUMIN SERPL-MCNC: 3.1 G/DL (ref 3.5–5.2)
ALP SERPL-CCNC: 64 U/L (ref 39–117)
ALT SERPL W P-5'-P-CCNC: 11 U/L (ref 1–41)
ANION GAP SERPL CALCULATED.3IONS-SCNC: 6.7 MMOL/L (ref 5–15)
AST SERPL-CCNC: 18 U/L (ref 1–40)
BASOPHILS # BLD AUTO: 0.04 10*3/MM3 (ref 0–0.2)
BASOPHILS NFR BLD AUTO: 0.7 % (ref 0–1.5)
BILIRUB CONJ SERPL-MCNC: <0.2 MG/DL (ref 0–0.3)
BILIRUB INDIRECT SERPL-MCNC: ABNORMAL MG/DL
BILIRUB SERPL-MCNC: 0.2 MG/DL (ref 0–1.2)
BUN SERPL-MCNC: 14 MG/DL (ref 8–23)
BUN/CREAT SERPL: 12.4 (ref 7–25)
CALCIUM SPEC-SCNC: 8.1 MG/DL (ref 8.6–10.5)
CHLORIDE SERPL-SCNC: 107 MMOL/L (ref 98–107)
CO2 SERPL-SCNC: 22.3 MMOL/L (ref 22–29)
CREAT SERPL-MCNC: 1.13 MG/DL (ref 0.76–1.27)
DEPRECATED RDW RBC AUTO: 46 FL (ref 37–54)
EGFRCR SERPLBLD CKD-EPI 2021: 64.5 ML/MIN/1.73
EOSINOPHIL # BLD AUTO: 0.23 10*3/MM3 (ref 0–0.4)
EOSINOPHIL NFR BLD AUTO: 3.8 % (ref 0.3–6.2)
ERYTHROCYTE [DISTWIDTH] IN BLOOD BY AUTOMATED COUNT: 13.3 % (ref 12.3–15.4)
GEN 5 2HR TROPONIN T REFLEX: 96 NG/L
GLUCOSE BLDC GLUCOMTR-MCNC: 107 MG/DL (ref 70–99)
GLUCOSE BLDC GLUCOMTR-MCNC: 150 MG/DL (ref 70–99)
GLUCOSE BLDC GLUCOMTR-MCNC: 161 MG/DL (ref 70–99)
GLUCOSE SERPL-MCNC: 148 MG/DL (ref 65–99)
HCT VFR BLD AUTO: 24 % (ref 37.5–51)
HGB BLD-MCNC: 7.7 G/DL (ref 13–17.7)
IMM GRANULOCYTES # BLD AUTO: 0.02 10*3/MM3 (ref 0–0.05)
IMM GRANULOCYTES NFR BLD AUTO: 0.3 % (ref 0–0.5)
LYMPHOCYTES # BLD AUTO: 1.1 10*3/MM3 (ref 0.7–3.1)
LYMPHOCYTES NFR BLD AUTO: 18.2 % (ref 19.6–45.3)
MAGNESIUM SERPL-MCNC: 1.8 MG/DL (ref 1.6–2.4)
MCH RBC QN AUTO: 30.6 PG (ref 26.6–33)
MCHC RBC AUTO-ENTMCNC: 32.1 G/DL (ref 31.5–35.7)
MCV RBC AUTO: 95.2 FL (ref 79–97)
MONOCYTES # BLD AUTO: 0.45 10*3/MM3 (ref 0.1–0.9)
MONOCYTES NFR BLD AUTO: 7.5 % (ref 5–12)
NEUTROPHILS NFR BLD AUTO: 4.19 10*3/MM3 (ref 1.7–7)
NEUTROPHILS NFR BLD AUTO: 69.5 % (ref 42.7–76)
NRBC BLD AUTO-RTO: 0 /100 WBC (ref 0–0.2)
PHOSPHATE SERPL-MCNC: 2 MG/DL (ref 2.5–4.5)
PLATELET # BLD AUTO: 148 10*3/MM3 (ref 140–450)
PMV BLD AUTO: 10.4 FL (ref 6–12)
POTASSIUM SERPL-SCNC: 4.1 MMOL/L (ref 3.5–5.2)
PROT SERPL-MCNC: 4.9 G/DL (ref 6–8.5)
QT INTERVAL: 395 MS
QTC INTERVAL: 483 MS
RBC # BLD AUTO: 2.52 10*6/MM3 (ref 4.14–5.8)
SODIUM SERPL-SCNC: 136 MMOL/L (ref 136–145)
TROPONIN T DELTA: 9 NG/L
TROPONIN T SERPL HS-MCNC: 87 NG/L
WBC NRBC COR # BLD: 6.03 10*3/MM3 (ref 3.4–10.8)

## 2023-08-29 PROCEDURE — 97161 PT EVAL LOW COMPLEX 20 MIN: CPT

## 2023-08-29 PROCEDURE — 94799 UNLISTED PULMONARY SVC/PX: CPT

## 2023-08-29 PROCEDURE — 94761 N-INVAS EAR/PLS OXIMETRY MLT: CPT

## 2023-08-29 PROCEDURE — 25010000002 AMPICILLIN PER 500 MG: Performed by: INTERNAL MEDICINE

## 2023-08-29 PROCEDURE — 86920 COMPATIBILITY TEST SPIN: CPT

## 2023-08-29 PROCEDURE — P9016 RBC LEUKOCYTES REDUCED: HCPCS

## 2023-08-29 PROCEDURE — 86922 COMPATIBILITY TEST ANTIGLOB: CPT

## 2023-08-29 PROCEDURE — 80076 HEPATIC FUNCTION PANEL: CPT | Performed by: FAMILY MEDICINE

## 2023-08-29 PROCEDURE — 80048 BASIC METABOLIC PNL TOTAL CA: CPT | Performed by: FAMILY MEDICINE

## 2023-08-29 PROCEDURE — 86900 BLOOD TYPING SEROLOGIC ABO: CPT

## 2023-08-29 PROCEDURE — 83735 ASSAY OF MAGNESIUM: CPT | Performed by: FAMILY MEDICINE

## 2023-08-29 PROCEDURE — 82948 REAGENT STRIP/BLOOD GLUCOSE: CPT

## 2023-08-29 PROCEDURE — 36430 TRANSFUSION BLD/BLD COMPNT: CPT

## 2023-08-29 PROCEDURE — 85025 COMPLETE CBC W/AUTO DIFF WBC: CPT | Performed by: FAMILY MEDICINE

## 2023-08-29 PROCEDURE — 63710000001 INSULIN LISPRO (HUMAN) PER 5 UNITS: Performed by: INTERNAL MEDICINE

## 2023-08-29 PROCEDURE — 93005 ELECTROCARDIOGRAM TRACING: CPT | Performed by: FAMILY MEDICINE

## 2023-08-29 PROCEDURE — 84100 ASSAY OF PHOSPHORUS: CPT | Performed by: FAMILY MEDICINE

## 2023-08-29 PROCEDURE — 93010 ELECTROCARDIOGRAM REPORT: CPT | Performed by: INTERNAL MEDICINE

## 2023-08-29 PROCEDURE — 84484 ASSAY OF TROPONIN QUANT: CPT | Performed by: FAMILY MEDICINE

## 2023-08-29 PROCEDURE — 99024 POSTOP FOLLOW-UP VISIT: CPT | Performed by: UROLOGY

## 2023-08-29 RX ORDER — METOPROLOL SUCCINATE 25 MG/1
25 TABLET, EXTENDED RELEASE ORAL
Status: DISCONTINUED | OUTPATIENT
Start: 2023-08-29 | End: 2023-08-30 | Stop reason: HOSPADM

## 2023-08-29 RX ADMIN — ALPRAZOLAM 0.25 MG: 0.25 TABLET ORAL at 19:48

## 2023-08-29 RX ADMIN — INSULIN LISPRO 2 UNITS: 100 INJECTION, SOLUTION INTRAVENOUS; SUBCUTANEOUS at 21:01

## 2023-08-29 RX ADMIN — NITROGLYCERIN 0.4 MG: 0.4 TABLET, ORALLY DISINTEGRATING SUBLINGUAL at 00:12

## 2023-08-29 RX ADMIN — BACITRACIN 0.9 G: 500 OINTMENT TOPICAL at 06:54

## 2023-08-29 RX ADMIN — ROSUVASTATIN 20 MG: 20 TABLET, FILM COATED ORAL at 21:00

## 2023-08-29 RX ADMIN — HYDROCODONE BITARTRATE AND ACETAMINOPHEN 1 TABLET: 10; 325 TABLET ORAL at 08:57

## 2023-08-29 RX ADMIN — AMPICILLIN INJECTION 500 MG: 500 POWDER, FOR SOLUTION INTRAMUSCULAR; INTRAVENOUS at 14:18

## 2023-08-29 RX ADMIN — AMPICILLIN INJECTION 500 MG: 500 POWDER, FOR SOLUTION INTRAMUSCULAR; INTRAVENOUS at 03:33

## 2023-08-29 RX ADMIN — CALCIUM CARBONATE 1 TABLET: 500 TABLET, CHEWABLE ORAL at 13:03

## 2023-08-29 RX ADMIN — SODIUM CHLORIDE 60 ML/HR: 9 INJECTION, SOLUTION INTRAVENOUS at 09:01

## 2023-08-29 RX ADMIN — BACITRACIN 0.9 G: 500 OINTMENT TOPICAL at 21:05

## 2023-08-29 RX ADMIN — CALCIUM CARBONATE 1 TABLET: 500 TABLET, CHEWABLE ORAL at 03:36

## 2023-08-29 RX ADMIN — AMPICILLIN INJECTION 500 MG: 500 POWDER, FOR SOLUTION INTRAMUSCULAR; INTRAVENOUS at 08:59

## 2023-08-29 RX ADMIN — METOPROLOL SUCCINATE 25 MG: 25 TABLET, EXTENDED RELEASE ORAL at 13:03

## 2023-08-29 RX ADMIN — ISOSORBIDE MONONITRATE 30 MG: 30 TABLET, EXTENDED RELEASE ORAL at 08:57

## 2023-08-29 RX ADMIN — HYDROCODONE BITARTRATE AND ACETAMINOPHEN 1 TABLET: 10; 325 TABLET ORAL at 13:03

## 2023-08-29 RX ADMIN — ASPIRIN 81 MG: 81 TABLET, COATED ORAL at 08:57

## 2023-08-29 RX ADMIN — LEVOTHYROXINE SODIUM 75 MCG: 75 TABLET ORAL at 06:53

## 2023-08-29 RX ADMIN — Medication 10 ML: at 21:01

## 2023-08-29 RX ADMIN — AMLODIPINE BESYLATE 5 MG: 5 TABLET ORAL at 08:57

## 2023-08-29 RX ADMIN — CLOPIDOGREL BISULFATE 75 MG: 75 TABLET ORAL at 08:57

## 2023-08-29 NOTE — PROGRESS NOTES
[facility]   Urology Progress Note    Patient Name: Andrea Odom  : 1940  MRN: [id number]  Primary Care Physician:  Mary Moya APRN  Date of admission: 2023    Subjective   Subjective       Could not void overnight having a lot of suprapubic pressure and pain    Objective   Objective     Vitals:   Temp:  [97.8 °F (36.6 °C)-98.3 °F (36.8 °C)] 98.1 °F (36.7 °C)  Heart Rate:  [] 94  Resp:  [16-18] 18  BP: (109-143)/(44-61) 124/50  Flow (L/min):  [0] 0  Physical Exam     Alert and oriented x3  No acute distress  Unlabored respirations, clear  Regular rate, no chest retractions  Bladder distended         Result Review    Result Review:  I have personally reviewed the results from the time of this admission to 2023 06:52 EDT and agree with these findings:  [x]  Laboratory  []  Microbiology  []  Radiology  []  EKG/Telemetry   []  Cardiology/Vascular   []  Pathology  []  Old records  []  Other:      Assessment & Plan   Assessment / Plan     Brief Patient Summary:  Andrea Odom is a 83 y.o. male history of p.o. stenosis, BPH with LUTS status post meatotomy, TURP, 2023    Takeback for clot retention, left hydronephrosis status post cystoscopy, clot evacuation, left ureteral stent placement 2023    S/p cardiac cath     Active Hospital Problems:  Active Hospital Problems    Diagnosis     **BPH (benign prostatic hyperplasia)     S/P TURP (status post transurethral resection of prostate)     S/P TURP     Stricture of male urethra     Benign prostatic hyperplasia with urinary hesitancy     Gross hematuria     Hydronephrosis     NSTEMI (non-ST elevated myocardial infarction)     ACS (acute coronary syndrome)     Unstable angina      Plan:     Could not void overnight, catheter replaced.    Needs to go home with indwelling catheter when he goes    Maintain left ureteral stent; plan to remove in a couple weeks as outpatient    Bacitracin to tip of penis twice daily    Okay for  discharge home with  catheter

## 2023-08-29 NOTE — PROGRESS NOTES
Date of service: 8/29/2023    Subjective: Still frustrated about what happened last night.  He could not void and had severe pain in the bladder area.  Had no anginal pain, SOB, palpitation, or dizziness.    Review of systems: As stated above.  No headache, vertigo, nausea, vomiting, abdominal pain at this time, fever, chills, TIA or CVA-like symptoms    Physical examination: He was in no respiratory distress.  Vital signs: Reviewed  HEENT: Sclerae: Nonicteric.   Neck: No JVD or carotid bruit.  Right carotid endarterectomy scar was noted.  Chest: CTA  Cardiac: RRR.  No S3 gallop  Abdomen: Soft and nontender.  No megalies or masses.  Extremities: No edema cyanosis or clubbing.  Pulse intact  Neuro: Alert, oriented x3, no facial droop and speech was clear     Records: Reviewed     Assessment and plan:     1.  Acute non-STEMI  2.  Noncardiac chest pain, resolved  3.  CAD status post PCI and CABG.  4.  Cardiac catheterization on 8/27/2023.  The left circumflex stent was patent.  Be treated medically.  Start Toprol-XL 25 mg p.o. daily.  5.  Continue the other cardiac medications.  6.  Nothing to add.  We will sign off.   I have reviewed and confirmed nurses' notes...

## 2023-08-29 NOTE — THERAPY EVALUATION
Acute Care - Physical Therapy Initial Evaluation   [facility]     Patient Name: Andrea Odom  : 1940  MRN: [id number]  Today's Date: 2023      Visit Dx:     ICD-10-CM ICD-9-CM   1. S/P TURP  Z90.79 V45.89   2. Stricture of male urethra, unspecified stricture type  N35.919 598.9   3. Benign prostatic hyperplasia with urinary hesitancy  N40.1 600.01    R39.11 788.64   4. Gross hematuria  R31.0 599.71   5. Hydronephrosis, unspecified hydronephrosis type  N13.30 591   6. Unstable angina  I20.0 411.1   7. ACS (acute coronary syndrome)  I24.9 411.1   8. NSTEMI (non-ST elevated myocardial infarction)  I21.4 410.70   9. Decreased activities of daily living (ADL)  Z78.9 V49.89   10. Difficulty walking  R26.2 719.7     Patient Active Problem List   Diagnosis    Acute chest pain    NSTEMI (non-ST elevated myocardial infarction)    ACS (acute coronary syndrome)    Anxiety    Aortic stenosis    Arteriosclerosis of coronary artery    B12 deficiency    Benign essential HTN    Bilateral carotid artery stenosis    Carotid artery disease    Diabetes mellitus type 2 in nonobese    Dyslipidemia    Epigastric pain    GERD (gastroesophageal reflux disease)    Mixed hyperlipidemia    Paralysis of glottis    Presence of aortocoronary bypass graft    Status post carotid endarterectomy    Thyroid disease    Unstable angina    Vertigo    Voice hoarseness    Acute cystitis with hematuria    Hyponatremia    Near syncope    Weakness    Stricture of male urethra    Benign prostatic hyperplasia with urinary hesitancy    BPH (benign prostatic hyperplasia)    S/P TURP (status post transurethral resection of prostate)    S/P TURP    Gross hematuria    Hydronephrosis     Past Medical History:   Diagnosis Date    Anxiety     Arthritis     BPH (benign prostatic hyperplasia)     Coronary artery disease     HAD STENTS PLACED. FOLLOWED BY DR HANSON. DECREASED ACTIVITY USES CANE    Diabetes mellitus     Frequent UTI     GERD  (gastroesophageal reflux disease)     Hyperlipidemia     Hypertension     Old MI (myocardial infarction)     SAID THINKS ABOUT 4-5 YEARS AGO    Stricture of male urethra     Stroke     SAYS ABOUT 4-5 YEARS AGO NO RESIDUAL     Past Surgical History:   Procedure Laterality Date    CARDIAC CATHETERIZATION      stent placed    CARDIAC CATHETERIZATION N/A 08/11/2022    Procedure: Left Heart Cath;  Surgeon: Allison Lazcano MD;  Location: [facility] INVASIVE LOCATION;  Service: Cardiovascular;  Laterality: N/A;    CARDIAC CATHETERIZATION N/A 08/11/2022    Procedure: Possible Percutaneous Coronary Intervention;  Surgeon: Allison Lazcano MD;  Location: [facility] CATH INVASIVE LOCATION;  Service: Cardiovascular;  Laterality: N/A;    CARDIAC CATHETERIZATION N/A 8/27/2023    Procedure: Left Heart Cath;  Surgeon: Saúl Berry MD;  Location: [facility] INVASIVE LOCATION;  Service: Cardiology;  Laterality: N/A;    CARDIAC SURGERY      2 VESSEL CABG SAYS AROUND 35-40    CYSTOSCOPY TRANSURETHRAL RESECTION OF PROSTATE N/A 8/25/2023    Procedure: MEATOTOMY, cysotscopy, transurethral resection of prostate;  Surgeon: Debbie Rosa MD;  Location: [facility]  Service: Urology;  Laterality: N/A;    CYSTOSCOPY WITH CLOT EVACUATION N/A 8/26/2023    Procedure: CYSTOSCOPY WITH CLOT EVACUATION, bladder fulguration;  Surgeon: Debbie Rosa MD;  Location: [facility]  Service: Urology;  Laterality: N/A;    CYSTOSCOPY, RETROGRADE PYELOGRAM AND STENT INSERTION Left 8/26/2023    Procedure: CYSTOSCOPY RETROGRADE PYELOGRAM AND STENT INSERTION, left;  Surgeon: Debbie Rosa MD;  Location: [facility]  Service: Urology;  Laterality: Left;    SHOULDER SURGERY      RCR UNSURE OF SIDE     PT Assessment (last 12 hours)       PT Evaluation and Treatment       Row Name 08/29/23 1342 08/29/23 1300       Physical Therapy Time and Intention    Subjective Information no complaints  -DP no complaints  -DP     Document Type evaluation  -DP evaluation  -DP    Mode of Treatment individual therapy;physical therapy  -DP individual therapy;physical therapy  -DP    Patient Effort good  -DP good  -DP    Symptoms Noted During/After Treatment none  -DP none  -DP      Row Name 08/29/23 1342 08/29/23 1300       General Information    Patient Profile Reviewed yes  -DP yes  -DP    Patient Observations alert;agree to therapy  -DP alert;agree to therapy  -DP    Prior Level of Function independent:;gait;transfer;bed mobility;ADL's;home management  -DP independent:;gait;transfer;bed mobility;ADL's;home management  -DP    Equipment Currently Used at Home cane, quad  -DP --    Existing Precautions/Restrictions fall  -DP --    Barriers to Rehab none identified  -DP --      Row Name 08/29/23 1342 08/29/23 1300       Living Environment    Current Living Arrangements home  -DP home  -DP    Home Accessibility -- stairs to enter home  -DP    People in Home -- alone  -DP    Primary Care Provided by self  -DP --      Row Name 08/29/23 1300          Home Main Entrance    Number of Stairs, Main Entrance two  -DP       Row Name 08/29/23 1342 08/29/23 1300       Home Use of Assistive/Adaptive Equipment    Equipment Currently Used at Home cane, quad tip  -DP cane, quad tip  -DP      Row Name 08/29/23 1342 08/29/23 1300       Cognition    Orientation Status (Cognition) oriented x 4  -DP oriented x 4  -DP      Row Name 08/29/23 1300          Range of Motion (ROM)    Range of Motion bilateral lower extremities;ROM is WFL  -DP       Row Name 08/29/23 1342          Strength (Manual Muscle Testing)    Strength (Manual Muscle Testing) bilateral lower extremities;strength is WFL  -DP       Row Name 08/29/23 1342          Bed Mobility    Supine-Sit [place] (Bed Mobility) standby assist  -DP       Row Name 08/29/23 1342          Transfers    Transfers sit-stand transfer;stand-sit transfer  -DP       Row Name 08/29/23 1342          Sit-Stand Transfer     Sit-Stand [place] (Transfers) contact guard  -DP     Assistive Device (Sit-Stand Transfers) other (see comments)  none  -DP       Row Name 08/29/23 1342          Stand-Sit Transfer    Stand-Sit [place] (Transfers) contact guard  -DP     Assistive Device (Stand-Sit Transfers) other (see comments)  none  -DP       Row Name 08/29/23 1342          Gait/Stairs (Locomotion)    Gait/Stairs Locomotion gait/ambulation assistive device  -DP     [place] Level (Gait) contact guard  -DP     Assistive Device (Gait) walker, front-wheeled  -DP     Distance in Feet (Gait) 120  -DP     Deviations/Abnormal Patterns (Gait) bar[place] decreased;stride length decreased  -DP       Row Name 08/29/23 1342          Balance    Balance Assessment standing dynamic balance  -DP     Dynamic Standing Balance contact guard  -DP     Position/Device Used, Standing Balance supported;walker, front-wheeled  -DP       Row Name             Wound 08/25/23 1526 urethral meatus Other (comment)    Wound - Properties Group Placement Date: 08/25/23  -HK Placement Time: 1526  -HK Location: urethral meatus  -HK Primary Wound Type: Other  -HK, Scope instertion site     Retired Wound - Properties Group Placement Date: 08/25/23  -HK Placement Time: 1526  -HK Location: urethral meatus  -HK Primary Wound Type: Other  -HK, Scope instertion site     Retired Wound - Properties Group Date first assessed: 08/25/23  -HK Time first assessed: 1526  -HK Location: urethral meatus  -HK Primary Wound Type: Other  -HK, Scope instertion site       Row Name 08/29/23 1342          Plan of Care Review    Plan of Care Reviewed With patient  -DP     Outcome Evaluation I patient has impairments with strength, transfers, and ambulation.  He will benefit from inpatient PT services and placement in a rehab facility upon discharge from the hospital.  -DP       Row Name 08/29/23 1342          Therapy Assessment/Plan (PT)    Rehab Potential (PT) good, to achieve stated therapy  goals  -DP     Criteria for Skilled Interventions Met (PT) yes;meets criteria  -DP     Therapy Frequency (PT) daily  -DP     Predicted Duration of Therapy Intervention (PT) 1 0days  -DP     Problem List (PT) problems related to;balance;mobility  -DP     Activity Limitations Related to Problem List (PT) unable to transfer safely;unable to ambulate safely  -DP       Row Name 08/29/23 1342          PT Evaluation Complexity    History, PT Evaluation Complexity no personal factors and/or comorbidities  -DP     Examination of Body Systems (PT Eval Complexity) total of 4 or more elements  -DP     Clinical Presentation (PT Evaluation Complexity) stable  -DP     Clinical Decision Making (PT Evaluation Complexity) low complexity  -DP     Overall Complexity (PT Evaluation Complexity) low complexity  -DP       Row Name 08/29/23 1342          Physical Therapy Goals    Gait Training Goal Selection (PT) gait training, PT goal 1  -DP       Row Name 08/29/23 1342          Gait Training Goal 1 (PT)    Activity/Assistive Device (Gait Training Goal 1, PT) gait (walking locomotion);cane, quad  -DP     [place] Level (Gait Training Goal 1, PT) supervision required  -DP     Distance (Gait Training Goal 1, PT) 200  -DP     Time Frame (Gait Training Goal 1, PT) 10 days  -DP               User Key  (r) = Recorded By, (t) = Taken By, (c) = Cosigned By      Initials Name Provider Type    Olivia Ac RN Registered Nurse    Sam Cooper, PT Physical Therapist                      PT Recommendation and Plan  Anticipated Discharge Disposition (PT): inpatient rehabilitation facility  Planned Therapy Interventions (PT): balance training, bed mobility training, gait training, strengthening, transfer training  Therapy Frequency (PT): daily  Plan of Care Reviewed With: patient  Outcome Evaluation: I patient has impairments with strength, transfers, and ambulation.  He will benefit from inpatient PT services and placement in a rehab  facility upon discharge from the hospital.   Outcome Measures       Row Name 08/29/23 1300             How much help from another person do you currently need...    Turning from your back to your side while in flat bed without using bedrails? 4  -DP      Moving from lying on back to sitting on the side of a flat bed without bedrails? 4  -DP      Moving to and from a bed to a chair (including a wheelchair)? 3  -DP      Standing up from a chair using your arms (e.g., wheelchair, bedside chair)? 3  -DP      Climbing 3-5 steps with a railing? 3  -DP      To walk in hospital room? 3  -DP      AM-PAC 6 Clicks Score (PT) 20  -DP         Functional Assessment    Outcome Measure Options AM-PAC 6 Clicks Basic Mobility (PT)  -DP                User Key  (r) = Recorded By, (t) = Taken By, (c) = Cosigned By      Initials Name Provider Type    Sam Cooper, PT Physical Therapist                     Time Calculation:    PT Charges       Row Name 08/29/23 1352             Time Calculation    PT Received On 08/29/23  -DP      PT Goal Re-Cert Due Date 09/07/23  -DP         Untimed Charges    PT Eval/Re-eval Minutes 45  -DP         Total Minutes    Untimed Charges Total Minutes 45  -DP       Total Minutes 45  -DP                User Key  (r) = Recorded By, (t) = Taken By, (c) = Cosigned By      Initials Name Provider Type    Sam Cooper, PT Physical Therapist                      PT G-Codes  Outcome Measure Options: AM-PAC 6 Clicks Basic Mobility (PT)  AM-PAC 6 Clicks Score (PT): 20  AM-PAC 6 Clicks Score (OT): 21    Sam Mcclendon, PT  8/29/2023

## 2023-08-29 NOTE — THERAPY EVALUATION
Acute Care - Physical Therapy Initial Evaluation   [facility]     Patient Name: Andrea Odom  : 1940  MRN: [id number]  Today's Date: 2023      Visit Dx:     ICD-10-CM ICD-9-CM   1. S/P TURP  Z90.79 V45.89   2. Stricture of male urethra, unspecified stricture type  N35.919 598.9   3. Benign prostatic hyperplasia with urinary hesitancy  N40.1 600.01    R39.11 788.64   4. Gross hematuria  R31.0 599.71   5. Hydronephrosis, unspecified hydronephrosis type  N13.30 591   6. Unstable angina  I20.0 411.1   7. ACS (acute coronary syndrome)  I24.9 411.1   8. NSTEMI (non-ST elevated myocardial infarction)  I21.4 410.70   9. Decreased activities of daily living (ADL)  Z78.9 V49.89   10. Difficulty walking  R26.2 719.7     Patient Active Problem List   Diagnosis    Acute chest pain    NSTEMI (non-ST elevated myocardial infarction)    ACS (acute coronary syndrome)    Anxiety    Aortic stenosis    Arteriosclerosis of coronary artery    B12 deficiency    Benign essential HTN    Bilateral carotid artery stenosis    Carotid artery disease    Diabetes mellitus type 2 in nonobese    Dyslipidemia    Epigastric pain    GERD (gastroesophageal reflux disease)    Mixed hyperlipidemia    Paralysis of glottis    Presence of aortocoronary bypass graft    Status post carotid endarterectomy    Thyroid disease    Unstable angina    Vertigo    Voice hoarseness    Acute cystitis with hematuria    Hyponatremia    Near syncope    Weakness    Stricture of male urethra    Benign prostatic hyperplasia with urinary hesitancy    BPH (benign prostatic hyperplasia)    S/P TURP (status post transurethral resection of prostate)    S/P TURP    Gross hematuria    Hydronephrosis     Past Medical History:   Diagnosis Date    Anxiety     Arthritis     BPH (benign prostatic hyperplasia)     Coronary artery disease     HAD STENTS PLACED. FOLLOWED BY DR HANSON. DECREASED ACTIVITY USES CANE    Diabetes mellitus     Frequent UTI     GERD  (gastroesophageal reflux disease)     Hyperlipidemia     Hypertension     Old MI (myocardial infarction)     SAID THINKS ABOUT 4-5 YEARS AGO    Stricture of male urethra     Stroke     SAYS ABOUT 4-5 YEARS AGO NO RESIDUAL     Past Surgical History:   Procedure Laterality Date    CARDIAC CATHETERIZATION      stent placed    CARDIAC CATHETERIZATION N/A 08/11/2022    Procedure: Left Heart Cath;  Surgeon: Allison Lazcano MD;  Location: [facility] INVASIVE LOCATION;  Service: Cardiovascular;  Laterality: N/A;    CARDIAC CATHETERIZATION N/A 08/11/2022    Procedure: Possible Percutaneous Coronary Intervention;  Surgeon: Allison Lazcano MD;  Location: [facility] CATH INVASIVE LOCATION;  Service: Cardiovascular;  Laterality: N/A;    CARDIAC CATHETERIZATION N/A 8/27/2023    Procedure: Left Heart Cath;  Surgeon: Saúl Berry MD;  Location: [facility] INVASIVE LOCATION;  Service: Cardiology;  Laterality: N/A;    CARDIAC SURGERY      2 VESSEL CABG SAYS AROUND 35-40    CYSTOSCOPY TRANSURETHRAL RESECTION OF PROSTATE N/A 8/25/2023    Procedure: MEATOTOMY, cysotscopy, transurethral resection of prostate;  Surgeon: Debbie Rosa MD;  Location: [facility];  Service: Urology;  Laterality: N/A;    CYSTOSCOPY WITH CLOT EVACUATION N/A 8/26/2023    Procedure: CYSTOSCOPY WITH CLOT EVACUATION, bladder fulguration;  Surgeon: Debbie Rosa MD;  Location: [facility];  Service: Urology;  Laterality: N/A;    CYSTOSCOPY, RETROGRADE PYELOGRAM AND STENT INSERTION Left 8/26/2023    Procedure: CYSTOSCOPY RETROGRADE PYELOGRAM AND STENT INSERTION, left;  Surgeon: Debbie Rosa MD;  Location: [facility];  Service: Urology;  Laterality: Left;    SHOULDER SURGERY      RCR UNSURE OF SIDE     PT Assessment (last 12 hours)       PT Evaluation and Treatment       Row Name 08/29/23 1342 08/29/23 1300       Physical Therapy Time and Intention    Subjective Information no complaints  -DP no complaints  -DP     Document Type evaluation  -DP evaluation  -DP    Mode of Treatment individual therapy;physical therapy  -DP individual therapy;physical therapy  -DP    Patient Effort good  -DP good  -DP    Symptoms Noted During/After Treatment none  -DP none  -DP      Row Name 08/29/23 1342 08/29/23 1300       General Information    Patient Profile Reviewed yes  -DP yes  -DP    Patient Observations alert;agree to therapy  -DP alert;agree to therapy  -DP    Prior Level of Function independent:;gait;transfer;bed mobility;ADL's;home management  -DP independent:;gait;transfer;bed mobility;ADL's;home management  -DP    Equipment Currently Used at Home cane, quad  -DP --    Existing Precautions/Restrictions fall  -DP --    Barriers to Rehab none identified  -DP --      Row Name 08/29/23 1342 08/29/23 1300       Living Environment    Current Living Arrangements -- home  -DP    Home Accessibility -- stairs to enter home  -DP    People in Home -- alone  -DP    Primary Care Provided by self  -DP --      Row Name 08/29/23 1300          Home Main Entrance    Number of Stairs, Main Entrance two  -DP       Row Name 08/29/23 1342 08/29/23 1300       Home Use of Assistive/Adaptive Equipment    Equipment Currently Used at Home cane, quad tip  -DP cane, quad tip  -DP      Row Name 08/29/23 1342 08/29/23 1300       Cognition    Orientation Status (Cognition) oriented x 4  -DP oriented x 4  -DP      Row Name 08/29/23 1300          Range of Motion (ROM)    Range of Motion bilateral lower extremities;ROM is WFL  -DP       Row Name 08/29/23 1342          Bed Mobility    Supine-Sit [place] (Bed Mobility) standby assist  -DP       Row Name 08/29/23 1342          Transfers    Transfers sit-stand transfer;stand-sit transfer  -DP       Row Name 08/29/23 1342          Sit-Stand Transfer    Sit-Stand [place] (Transfers) contact guard  -DP     Assistive Device (Sit-Stand Transfers) other (see comments)  none  -DP       Row Name 08/29/23 1342           Stand-Sit Transfer    Stand-Sit [place] (Transfers) contact guard  -DP     Assistive Device (Stand-Sit Transfers) other (see comments)  none  -DP       Row Name 08/29/23 1342          Gait/Stairs (Locomotion)    Gait/Stairs Locomotion gait/ambulation assistive device  -DP     [place] Level (Gait) contact guard  -DP     Assistive Device (Gait) walker, front-wheeled  -DP     Deviations/Abnormal Patterns (Gait) [place] decreased;stride length decreased  -DP       Row Name 08/29/23 1342          Balance    Balance Assessment standing dynamic balance  -DP     Dynamic Standing Balance contact guard  -DP     Position/Device Used, Standing Balance supported;walker, front-wheeled  -DP       Row Name             Wound 08/25/23 1526 urethral meatus Other (comment)    Wound - Properties Group Placement Date: 08/25/23  -HK Placement Time: 1526  -HK Location: urethral meatus  -HK Primary Wound Type: Other  -HK, Scope instertion site     Retired Wound - Properties Group Placement Date: 08/25/23  -HK Placement Time: 1526  -HK Location: urethral meatus  -HK Primary Wound Type: Other  -HK, Scope instertion site     Retired Wound - Properties Group Date first assessed: 08/25/23  -HK Time first assessed: 1526  -HK Location: urethral meatus  -HK Primary Wound Type: Other  -HK, Scope instertion site       Row Name 08/29/23 1342          Therapy Assessment/Plan (PT)    Rehab Potential (PT) good, to achieve stated therapy goals  -DP     Criteria for Skilled Interventions Met (PT) yes;meets criteria  -DP     Therapy Frequency (PT) daily  -DP     Problem List (PT) problems related to;balance;mobility  -DP       Row Name 08/29/23 1342          PT Evaluation Complexity    History, PT Evaluation Complexity no personal factors and/or comorbidities  -DP     Clinical Decision Making (PT Evaluation Complexity) low complexity  -DP       Row Name 08/29/23 1342          Physical Therapy Goals    Gait Training Goal Selection (PT) gait  training, PT goal 1  -DP       Row Name 08/29/23 1342          Gait Training Goal 1 (PT)    Activity/Assistive Device (Gait Training Goal 1, PT) gait (walking locomotion);cane, quad  -DP     [place] Level (Gait Training Goal 1, PT) supervision required  -DP     Distance (Gait Training Goal 1, PT) 200  -DP     Time Frame (Gait Training Goal 1, PT) 10 days  -DP               User Key  (r) = Recorded By, (t) = Taken By, (c) = Cosigned By      Initials Name Provider Type    Olivia Ac RN Registered Nurse    Sam Cooper, PT Physical Therapist                      PT Recommendation and Plan  Anticipated Discharge Disposition (PT): inpatient rehabilitation facility  Therapy Frequency (PT): daily      Outcome Measures       Row Name 08/29/23 1300             How much help from another person do you currently need...    Turning from your back to your side while in flat bed without using bedrails? 4  -DP      Moving from lying on back to sitting on the side of a flat bed without bedrails? 4  -DP      Moving to and from a bed to a chair (including a wheelchair)? 3  -DP      Standing up from a chair using your arms (e.g., wheelchair, bedside chair)? 3  -DP      Climbing 3-5 steps with a railing? 3  -DP      To walk in hospital room? 3  -DP      AM-PAC 6 Clicks Score (PT) 20  -DP         Functional Assessment    Outcome Measure Options AM-PAC 6 Clicks Basic Mobility (PT)  -DP                User Key  (r) = Recorded By, (t) = Taken By, (c) = Cosigned By      Initials Name Provider Type    Sam Cooper, PT Physical Therapist                     Time Calculation:    PT Charges       Row Name 08/29/23 1352             Time Calculation    PT Received On 08/29/23  -DP      PT Goal Re-Cert Due Date 09/07/23  -DP         Untimed Charges    PT Eval/Re-eval Minutes 45  -DP         Total Minutes    Untimed Charges Total Minutes 45  -DP       Total Minutes 45  -DP                User Key  (r) = Recorded By, (t) =  Taken By, (c) = Cosigned By      Initials Name Provider Type    Sam Cooper PT Physical Therapist                      PT G-Codes  Outcome Measure Options: AM-PAC 6 Clicks Basic Mobility (PT)  AM-PAC 6 Clicks Score (PT): 20  AM-PAC 6 Clicks Score (OT): 21    Sam Mcclendon PT  8/29/2023

## 2023-08-29 NOTE — PLAN OF CARE
Goal Outcome Evaluation:  Plan of Care Reviewed With: patient           Outcome Evaluation: I patient has impairments with strength, transfers, and ambulation.  He will benefit from inpatient PT services and placement in a rehab facility upon discharge from the hospital.      Anticipated Discharge Disposition (PT): inpatient rehabilitation facility

## 2023-08-29 NOTE — PROGRESS NOTES
[facility]   Hospitalist Progress Note  Date: 2023  Patient Name: Andrea Odom  : 1940  MRN: [id number]  Date of admission: 2023      Subjective   Subjective     Chief Complaint: Medical consult for blood pressure and diabetes    Summary:Anrdea Odom is a 83 y.o. male with history of anxiety, depression, hypothyroidism, BPH with LUTS, aortic stenosis status post TAVR, coronary artery disease with history of CABG, stenting in the past year on aspirin and Plavix, diabetes, GERD without esophagitis, dyslipidemia, stroke, hospitalized after having TURP, has three-way catheter CBI after undergoing meatotomy, TURP, 2023. Taken back to the operating room 2023 for clot evacuation, bladder fulguration, as well as left retrograde pyelogram and ureteral stent insertion. Tolerated procedure well. 2023 chest pain, elevated high-sensitivity troponin initially, chest pain alleviated by nitro, EKG with ST depression, established NSTEMI diagnosis, consulted cardiology, status post cardiac cath, showing severe native CAD, occluded ostial LAD, LAD fills via patent LIMA graft, occluded previous OM 2 stent, patent left circumflex into OM1 stent, patent LIMA to LAD, advised continue medical optimization. Patient transferred to hospitalist service after NSTEMI.  Hemoglobin trending down.  Transfuse to keep hemoglobin greater than 8 due to continued hematuria.    Interval Followup: Patient lying in bed appears to be resting comfortably.  Patient continues to endorse fatigue though very eager to return home.  Patient unable to void overnight and catheter replaced.  Continues to have what appears to be old blood in Marroquin catheter.  Afebrile overnight.  Sinus rhythm 70s to 100 on telemetry review.  Blood pressure low normal limits.  Satting well on room air.  Blood sugars within normal limits.  Hemoglobin trending down this morning to 7.7 from 9.2 the day prior platelets within normal limits.       Review of Systems  Constitutional: Positive for fatigue and negative fever.   HENT: Negative for sore throat and trouble swallowing.    Eyes: Negative for pain and discharge.   Respiratory: Negative for cough and shortness of breath.    Cardiovascular: Negative for chest pain and palpitations.   Gastrointestinal: Negative for abdominal pain, nausea and vomiting.   Endocrine: Negative for cold intolerance and heat intolerance.   Genitourinary: Negative for difficulty urinating and dysuria.  Positive hematuria  Musculoskeletal: Negative for back pain and neck stiffness.   Skin: Negative for color change and rash.   Neurological: Negative for syncope and headaches.   Hematological: Negative for adenopathy.   Psychiatric/Behavioral: Negative for confusion and hallucinations.    Objective   Objective     Vitals:   Temp:  [97.9 °F (36.6 °C)-98.7 °F (37.1 °C)] 98.3 °F (36.8 °C)  Heart Rate:  [] 90  Resp:  [16-18] 18  BP: ()/(44-61) 102/44  Physical Exam   General: well-developed appearing stated age in no acute distress  HEENT: Normocephalic atraumatic moist membranes pupils equal round reactive light, no scleral icterus no conjunctival injection  Cardiovascular: regular rate and rhythm no murmurs rubs or gallops S1-S2, no lower extremity edema appreciated  Pulmonary: Clear to auscultation bilaterally no wheezes rales or rhonchi symmetric chest expansion, unlabored, no conversational dyspnea appreciated  Gastrointestinal: Soft nontender nondistended positive bowel sounds all 4 quadrants no rebound or guarding  Musculoskeletal: No clubbing cyanosis, warm and well-perfused, calves soft symmetric nontender bilaterally  Skin: Clean dry without rashes  Neuro: Cranial nerves II through XII intact grossly no sensorimotor deficits appreciated bilateral upper and lower extremities  Psych: Patient is calm cooperative and appropriate with exam not responding to internal stimuli  :  Marroquin catheter draining red-tinged  urine no bladder distention no suprapubic tenderness    Result Review    Result Review:  I have personally reviewed these results and agree with these findings:  [x]  Laboratory  LAB RESULTS:      Lab 08/29/23 0309 08/28/23 0408 08/27/23  0423 08/26/23  1254 08/26/23  0425   WBC 6.03 6.49 8.00 11.55* 11.35*   HEMOGLOBIN 7.7* 9.2* 10.6* 11.4* 11.3*   HEMATOCRIT 24.0* 28.3* 32.5* 32.7* 33.9*   PLATELETS 148 158 158 210 202   NEUTROS ABS 4.19 4.03  --   --  9.48*   IMMATURE GRANS (ABS) 0.02 0.01  --   --  0.05   LYMPHS ABS 1.10 1.55  --   --  0.88   MONOS ABS 0.45 0.66  --   --  0.92*   EOS ABS 0.23 0.21  --   --  0.00   MCV 95.2 96.9 95.6 92.4 93.4         Lab 08/29/23 0309 08/28/23 0408 08/27/23  0423 08/26/23  1254 08/26/23  0425   SODIUM 136 137 131* 127* 125*   POTASSIUM 4.1 4.1 5.0 4.5 4.5   CHLORIDE 107 106 104 93* 92*   CO2 22.3 21.2* 17.2* 24.7 22.6   ANION GAP 6.7 9.8 9.8 9.3 10.4   BUN 14 14 19 22 21   CREATININE 1.13 1.12 1.30* 1.56* 1.18   EGFR 64.5 65.2 54.5* 43.8* 61.2   GLUCOSE 148* 82 170* 162* 185*   CALCIUM 8.1* 8.4* 8.6 8.9 8.6   MAGNESIUM 1.8 2.3  --   --  1.9   PHOSPHORUS 2.0* 2.2*  --   --   --    HEMOGLOBIN A1C  --  5.70*  --   --   --          Lab 08/29/23 0309 08/28/23 0408 08/26/23  0425   TOTAL PROTEIN 4.9* 5.3* 5.9*   ALBUMIN 3.1* 3.4* 3.8   GLOBULIN  --   --  2.1   ALT (SGPT) 11 12 9   AST (SGOT) 18 27 19   BILIRUBIN 0.2 0.2 0.6   BILIRUBIN DIRECT <0.2 <0.2  --    ALK PHOS 64 67 73         Lab 08/29/23  0309 08/29/23  0100 08/27/23  1156 08/27/23  0933   PROBNP  --   --   --  855.7   HSTROP T 96* 87* 105* 79*         Lab 08/28/23  0408   CHOLESTEROL 119   LDL CHOL 57   HDL CHOL 46   TRIGLYCERIDES 81         Lab 08/25/23  1228   ABO TYPING O   RH TYPING Positive   ANTIBODY SCREEN Positive         Brief Urine Lab Results  (Last result in the past 365 days)        Color   Clarity   Blood   Leuk Est   Nitrite   Protein   CREAT   Urine HCG        08/19/23 2351 Yellow   Clear   Trace    Moderate (2+)   Negative   Trace                 Microbiology Results (last 10 days)       ** No results found for the last 240 hours. **            []  Microbiology  [x]  Radiology  XR Chest 1 View    Result Date: 8/26/2023    No acute infiltrate is appreciated.     Please note that portions of this note were completed with a voice recognition program.  FÁTIMA HERNANDEZ JR, MD       Electronically Signed and Approved By: FÁTIMA HERNANDEZ JR, MD on 8/26/2023 at 2:13              FL C Arm During Surgery    Result Date: 8/26/2023    Left ureteral stent insertion is noted, as discussed.     Please note that portions of this note were completed with a voice recognition program.  FÁTIAM HERNANDEZ JR, MD       Electronically Signed and Approved By: FÁTIMA HERNANDEZ JR, MD on 8/26/2023 at 19:29              CT Abdomen Pelvis Stone Protocol    Result Date: 8/26/2023    1. Marroquin catheter is within the bladder and surrounded by high density material compatible with hemorrhagic clot.  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 There is mild hydroureteronephrosis without obstructing calculus.  Findings likely related to obstruction secondary to hemorrhage within the bladder 3. No acute intra-abdominal or intrapelvic abnormality otherwise noted on motion limited imaging.     CHLOE GROVE MD       Electronically Signed and Approved By: CHLOE GROVE MD on 8/26/2023 at 17:14              [x]  EKG/Telemetry   []  Cardiology/Vascular   []  Pathology  []  Old records  [x]  Other:  Scheduled Meds:!Patient Home Medications Stored in Pharmacy, , Does not apply, BID  amLODIPine, 5 mg, Oral, Q24H  aspirin, 81 mg, Oral, Daily  bacitracin, 1 application , Topical, Q8H  clopidogrel, 75 mg, Oral, Daily  insulin lispro, 2-7 Units, Subcutaneous, 4x Daily AC & at Bedtime  isosorbide mononitrate, 30 mg, Oral, Q24H  levothyroxine, 75 mcg, Oral, Q AM  Lidocaine, 1 patch, Transdermal,  Q24H  metoprolol succinate XL, 25 mg, Oral, Q24H  rosuvastatin, 20 mg, Oral, Nightly  sodium chloride, 10 mL, Intravenous, Q12H  vancomycin, 1,000 mg, Intravenous, Once      Continuous Infusions:lactated ringers, 9 mL/hr, Last Rate: 9 mL/hr (08/25/23 1310)  sodium chloride, 60 mL/hr, Last Rate: 60 mL/hr (08/29/23 0901)      PRN Meds:.  [DISCONTINUED] acetaminophen **OR** acetaminophen    acetaminophen    ALPRAZolam    calcium carbonate    dextrose    dextrose    docusate sodium    glucagon (human recombinant)    HYDROcodone-acetaminophen    HYDROcodone-acetaminophen    lactated ringers    Morphine    nitroglycerin    ondansetron **OR** ondansetron    oxybutynin    oxyCODONE-acetaminophen    sodium chloride    sodium chloride      Assessment & Plan   Assessment / Plan     Assessment/Plan:  Acute blood loss anemia  Diabetes mellitus  CAD status post stents and CABG  Unstable angina  Left hydronephrosis now status post stent placement  Urinary retention  Elevated troponin due to NSTEMI  Essential hypertension  Dyslipidemia  History of aortic stenosis status post TAVR  Anxiety  Depression  Hypothyroidism  Bladder spasms  Status post meatotomy/TURP        Patient admitted for further evaluation and treatment  Urology cardiology consulted thank you for your assistance  Transfuse 1 unit packed red blood cells to keep hemoglobin greater than 8 in the setting of coronary artery disease with unstable angina and active blood loss with continued hematuria  Recheck hemoglobin 1 hour posttransfusion as well as in a.m.  Continue sliding-scale insulin  Continue heart healthy diabetic diet  Continue aspirin and Plavix per cardiology  Continue rosuvastatin per cardiology  Continue isosorbide mononitrate  Start metoprolol and titrate per cardiology  Continue amlodipine  Patient to maintain left ureteral stent and follow-up with urology to remove in 2 weeks  We will need to go home with indwelling catheter  Hold further IV  fluids  Continue levothyroxine  Continue as needed oxybutynin  Continue wound care with bacitracin to urethral meatus per urology  Further inpatient orders recommendations pending clinical course       Discussed plan with bedside RN.    Disposition: Home once hemoglobin stabilizes to follow-up with urology as an outpatient.    DVT prophylaxis:  Mechanical DVT prophylaxis orders are present.    CODE STATUS:   Code Status (Patient has no pulse and is not breathing): CPR (Attempt to Resuscitate)  Medical Interventions (Patient has pulse or is breathing): Full

## 2023-08-30 ENCOUNTER — READMISSION MANAGEMENT (OUTPATIENT)
Dept: CALL CENTER | Facility: HOSPITAL | Age: 83
End: 2023-08-30
Payer: MEDICARE

## 2023-08-30 VITALS
HEART RATE: 69 BPM | HEIGHT: 68 IN | BODY MASS INDEX: 25.76 KG/M2 | OXYGEN SATURATION: 98 % | WEIGHT: 169.97 LBS | DIASTOLIC BLOOD PRESSURE: 45 MMHG | RESPIRATION RATE: 18 BRPM | TEMPERATURE: 99.1 F | SYSTOLIC BLOOD PRESSURE: 110 MMHG

## 2023-08-30 PROBLEM — I25.82 TOTAL OCCLUSION OF CORONARY ARTERY, CHRONIC: Status: ACTIVE | Noted: 2023-08-30

## 2023-08-30 PROBLEM — I21.A1 TYPE 2 MYOCARDIAL INFARCTION: Status: ACTIVE | Noted: 2023-08-30

## 2023-08-30 LAB
AORTIC DIMENSIONLESS INDEX: 0.6 (DI)
BH CV ECHO MEAS - AO MAX PG: 4 MMHG
BH CV ECHO MEAS - AO MEAN PG: 4 MMHG
BH CV ECHO MEAS - AO ROOT DIAM: 3.1 CM
BH CV ECHO MEAS - AO V2 MAX: 103 CM/SEC
BH CV ECHO MEAS - AO V2 VTI: 27.9 CM
BH CV ECHO MEAS - AVA(I,D): 1.59 CM2
BH CV ECHO MEAS - EDV(CUBED): 85.2 ML
BH CV ECHO MEAS - EDV(MOD-SP2): 77.3 ML
BH CV ECHO MEAS - EDV(MOD-SP4): 73.7 ML
BH CV ECHO MEAS - EF(MOD-SP2): 49.2 %
BH CV ECHO MEAS - EF(MOD-SP4): 46.5 %
BH CV ECHO MEAS - ESV(CUBED): 39.3 ML
BH CV ECHO MEAS - ESV(MOD-SP2): 39.3 ML
BH CV ECHO MEAS - ESV(MOD-SP4): 39.4 ML
BH CV ECHO MEAS - FS: 22.7 %
BH CV ECHO MEAS - IVS/LVPW: 1 CM
BH CV ECHO MEAS - IVSD: 1.1 CM
BH CV ECHO MEAS - LA DIMENSION: 3.8 CM
BH CV ECHO MEAS - LAT PEAK E' VEL: 10.8 CM/SEC
BH CV ECHO MEAS - LV DIASTOLIC VOL/BSA (35-75): 38.7 CM2
BH CV ECHO MEAS - LV MASS(C)D: 168.9 GRAMS
BH CV ECHO MEAS - LV MAX PG: 1.57 MMHG
BH CV ECHO MEAS - LV MEAN PG: 1 MMHG
BH CV ECHO MEAS - LV SYSTOLIC VOL/BSA (12-30): 20.7 CM2
BH CV ECHO MEAS - LV V1 MAX: 62.7 CM/SEC
BH CV ECHO MEAS - LV V1 VTI: 14.1 CM
BH CV ECHO MEAS - LVIDD: 4.4 CM
BH CV ECHO MEAS - LVIDS: 3.4 CM
BH CV ECHO MEAS - LVOT AREA: 3.1 CM2
BH CV ECHO MEAS - LVOT DIAM: 2 CM
BH CV ECHO MEAS - LVPWD: 1.1 CM
BH CV ECHO MEAS - MED PEAK E' VEL: 5.6 CM/SEC
BH CV ECHO MEAS - MR MAX PG: 145.9 MMHG
BH CV ECHO MEAS - MR MAX VEL: 604 CM/SEC
BH CV ECHO MEAS - MR MEAN PG: 93 MMHG
BH CV ECHO MEAS - MR MEAN VEL: 457 CM/SEC
BH CV ECHO MEAS - MR VTI: 195 CM
BH CV ECHO MEAS - MV A MAX VEL: 129 CM/SEC
BH CV ECHO MEAS - MV DEC TIME: 0.26 MSEC
BH CV ECHO MEAS - MV E MAX VEL: 119 CM/SEC
BH CV ECHO MEAS - MV E/A: 0.92
BH CV ECHO MEAS - RVDD: 2.7 CM
BH CV ECHO MEAS - SI(MOD-SP2): 20 ML/M2
BH CV ECHO MEAS - SI(MOD-SP4): 18 ML/M2
BH CV ECHO MEAS - SV(LVOT): 44.3 ML
BH CV ECHO MEAS - SV(MOD-SP2): 38 ML
BH CV ECHO MEAS - SV(MOD-SP4): 34.3 ML
BH CV ECHO MEAS - TAPSE (>1.6): 1.37 CM
BH CV ECHO MEASUREMENTS AVERAGE E/E' RATIO: 14.51
CYTO UR: NORMAL
DEPRECATED RDW RBC AUTO: 48.6 FL (ref 37–54)
ERYTHROCYTE [DISTWIDTH] IN BLOOD BY AUTOMATED COUNT: 14.6 % (ref 12.3–15.4)
GLUCOSE BLDC GLUCOMTR-MCNC: 112 MG/DL (ref 70–99)
GLUCOSE BLDC GLUCOMTR-MCNC: 147 MG/DL (ref 70–99)
HCT VFR BLD AUTO: 29.3 % (ref 37.5–51)
HCT VFR BLD AUTO: 29.5 % (ref 37.5–51)
HGB BLD-MCNC: 9.5 G/DL (ref 13–17.7)
HGB BLD-MCNC: 9.9 G/DL (ref 13–17.7)
HOLD SPECIMEN: NORMAL
LAB AP CASE REPORT: NORMAL
LAB AP CLINICAL INFORMATION: NORMAL
LAB AP SPECIAL STAINS: NORMAL
LEFT ATRIUM VOLUME INDEX: 23.3 ML/M2
MCH RBC QN AUTO: 30.4 PG (ref 26.6–33)
MCHC RBC AUTO-ENTMCNC: 33.6 G/DL (ref 31.5–35.7)
MCV RBC AUTO: 90.5 FL (ref 79–97)
PATH REPORT.FINAL DX SPEC: NORMAL
PATH REPORT.GROSS SPEC: NORMAL
PLATELET # BLD AUTO: 149 10*3/MM3 (ref 140–450)
PMV BLD AUTO: 10.3 FL (ref 6–12)
RBC # BLD AUTO: 3.26 10*6/MM3 (ref 4.14–5.8)
WBC NRBC COR # BLD: 6.65 10*3/MM3 (ref 3.4–10.8)

## 2023-08-30 PROCEDURE — 97116 GAIT TRAINING THERAPY: CPT

## 2023-08-30 PROCEDURE — 85014 HEMATOCRIT: CPT | Performed by: FAMILY MEDICINE

## 2023-08-30 PROCEDURE — 85018 HEMOGLOBIN: CPT | Performed by: FAMILY MEDICINE

## 2023-08-30 PROCEDURE — 82948 REAGENT STRIP/BLOOD GLUCOSE: CPT

## 2023-08-30 PROCEDURE — 94799 UNLISTED PULMONARY SVC/PX: CPT

## 2023-08-30 PROCEDURE — 97110 THERAPEUTIC EXERCISES: CPT

## 2023-08-30 PROCEDURE — 85027 COMPLETE CBC AUTOMATED: CPT | Performed by: FAMILY MEDICINE

## 2023-08-30 RX ORDER — BACITRACIN ZINC AND POLYMYXIN B SULFATE 500; 1000 [USP'U]/G; [USP'U]/G
OINTMENT TOPICAL
Qty: 28.4 G | Refills: 0 | Status: SHIPPED | OUTPATIENT
Start: 2023-08-30

## 2023-08-30 RX ORDER — LISINOPRIL 2.5 MG/1
2.5 TABLET ORAL DAILY
Qty: 30 TABLET | Refills: 0 | Status: SHIPPED | OUTPATIENT
Start: 2023-08-30 | End: 2023-09-29

## 2023-08-30 RX ORDER — HYDROCODONE BITARTRATE AND ACETAMINOPHEN 7.5; 325 MG/1; MG/1
1 TABLET ORAL EVERY 6 HOURS PRN
Qty: 10 TABLET | Refills: 0 | Status: SHIPPED | OUTPATIENT
Start: 2023-08-30

## 2023-08-30 RX ORDER — DIAPER,BRIEF,INFANT-TODD,DISP
1 EACH MISCELLANEOUS EVERY 8 HOURS SCHEDULED
Qty: 28 G | Refills: 0 | Status: SHIPPED | OUTPATIENT
Start: 2023-08-30 | End: 2023-09-06

## 2023-08-30 RX ORDER — METOPROLOL SUCCINATE 25 MG/1
25 TABLET, EXTENDED RELEASE ORAL
Qty: 30 TABLET | Refills: 0 | Status: SHIPPED | OUTPATIENT
Start: 2023-08-31 | End: 2023-09-30

## 2023-08-30 RX ORDER — ISOSORBIDE MONONITRATE 30 MG/1
30 TABLET, EXTENDED RELEASE ORAL
Qty: 30 TABLET | Refills: 0 | Status: SHIPPED | OUTPATIENT
Start: 2023-08-31 | End: 2023-09-30

## 2023-08-30 RX ADMIN — HYDROCODONE BITARTRATE AND ACETAMINOPHEN 1 TABLET: 10; 325 TABLET ORAL at 10:58

## 2023-08-30 RX ADMIN — ALPRAZOLAM 0.25 MG: 0.25 TABLET ORAL at 08:49

## 2023-08-30 RX ADMIN — LEVOTHYROXINE SODIUM 75 MCG: 75 TABLET ORAL at 06:04

## 2023-08-30 RX ADMIN — AMLODIPINE BESYLATE 5 MG: 5 TABLET ORAL at 08:49

## 2023-08-30 RX ADMIN — ISOSORBIDE MONONITRATE 30 MG: 30 TABLET, EXTENDED RELEASE ORAL at 08:49

## 2023-08-30 RX ADMIN — CLOPIDOGREL BISULFATE 75 MG: 75 TABLET ORAL at 08:49

## 2023-08-30 RX ADMIN — BACITRACIN 0.9 G: 500 OINTMENT TOPICAL at 06:04

## 2023-08-30 RX ADMIN — ASPIRIN 81 MG: 81 TABLET, COATED ORAL at 08:49

## 2023-08-30 RX ADMIN — METOPROLOL SUCCINATE 25 MG: 25 TABLET, EXTENDED RELEASE ORAL at 08:49

## 2023-08-30 NOTE — DISCHARGE SUMMARY
[facility]         HOSPITALIST  DISCHARGE SUMMARY    Patient Name: Andrea Odom  : 1940  MRN: [id number]    Date of Admission: 2023  Date of Discharge: 2023  Primary Care Physician: Mary Moya APRN    Consults       Date and Time Order Name Status Description    2023 11:27 AM Inpatient Cardiology Consult      2023  5:03 PM Inpatient Hospitalist Consult              Active and Resolved Hospital Problems:  Acute blood loss anemia  Diabetes mellitus  CAD status post stents and CABG  Unstable angina  Left hydronephrosis now status post stent placement  Urinary retention  Elevated troponin due to NSTEMI  Essential hypertension  Dyslipidemia  History of aortic stenosis status post TAVR  Anxiety  Depression  Hypothyroidism  Bladder spasms  Status post meatotomy/TURP  Active Hospital Problems    Diagnosis POA    **BPH (benign prostatic hyperplasia) [N40.0] Yes    Total occlusion of coronary artery, chronic [I25.82] Yes    Type 2 myocardial infarction [I21.A1] Yes    S/P TURP (status post transurethral resection of prostate) [Z90.79] Not Applicable    S/P TURP [Z90.79] Not Applicable    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       Hospital Course     Hospital Course:  Andrea Odom is a 83 y.o. male  with history of anxiety, depression, hypothyroidism, BPH with LUTS, aortic stenosis status post TAVR, coronary artery disease with history of CABG, stenting in the past year on aspirin and Plavix, diabetes, GERD without esophagitis, dyslipidemia, stroke, hospitalized after having TURP, has three-way  catheter CBI after undergoing meatotomy, TURP, 8/25/2023. Taken back to the operating room 8/26/2023 for clot evacuation, bladder fulguration, as well as left retrograde pyelogram and ureteral stent insertion. Tolerated procedure well. 8/27/2023 chest pain, elevated high-sensitivity troponin initially, chest pain alleviated by nitro, EKG with ST depression, established NSTEMI diagnosis, consulted cardiology, status post cardiac cath, showing severe native CAD, occluded ostial LAD, LAD fills via patent LIMA graft, occluded previous OM 2 stent, patent left circumflex into OM1 stent, patent LIMA to LAD, advised continued medical optimization.  Continued on aspirin Plavix and statin therapy.  Patient transferred to hospitalist service after NSTEMI.  Hemoglobin trending down.  Transfused to keep hemoglobin greater than 8 due to continued hematuria.  Hemoglobin stabilized.  Hematuria resolved.  Patient to keep stent and Marroquin catheter in place until follow-up with urology.  Patient seen and evaluated on date of discharge and thought stable for discharge home to follow-up with his primary care provider urology and cardiology as an outpatient.  Referred for outpatient cardiac rehab.        DISCHARGE Follow Up Recommendations for labs and diagnostics: As above      Day of Discharge     Vital Signs:  Temp:  [97.9 °F (36.6 °C)-99.4 °F (37.4 °C)] 99.1 °F (37.3 °C)  Heart Rate:  [67-90] 69  Resp:  [18] 18  BP: ()/(44-64) 110/45  Physical Exam:     General: well-developed appearing stated age in no acute distress  HEENT: Normocephalic atraumatic moist membranes pupils equal round reactive light, no scleral icterus no conjunctival injection  Cardiovascular: regular rate and rhythm no murmurs rubs or gallops S1-S2, no lower extremity edema appreciated  Pulmonary: Clear to auscultation bilaterally no wheezes rales or rhonchi symmetric chest expansion, unlabored, no conversational dyspnea appreciated  Gastrointestinal: Soft  nontender nondistended positive bowel sounds all 4 quadrants no rebound or guarding  Musculoskeletal: No clubbing cyanosis, warm and well-perfused, calves soft symmetric nontender bilaterally  Skin: Clean dry without rashes  Neuro: Cranial nerves II through XII intact grossly no sensorimotor deficits appreciated bilateral upper and lower extremities  Psych: Patient is calm cooperative and appropriate with exam not responding to internal stimuli  :  Marroquin catheter draining light yellow urine no bladder distention no suprapubic tenderness          Discharge Details        Discharge Medications        New Medications        Instructions Start Date   bacitracin 500 UNIT/GM ointment   0.9 g, Topical, Every 8 Hours Scheduled, Apply to tip of penis per urology instructions      bacitracin-polymyxin b [id number] UNIT/GM ointment  Commonly known as: POLYSPORIN   Apply to tip of penis twice daily      HYDROcodone-acetaminophen 7.5-325 MG per tablet  Commonly known as: Norco   1 tablet, Oral, Every 6 Hours PRN      isosorbide mononitrate 30 MG 24 hr tablet  Commonly known as: IMDUR   30 mg, Oral, Every 24 Hours Scheduled   Start Date: August 31, 2023     metoprolol succinate XL 25 MG 24 hr tablet  Commonly known as: TOPROL-XL   25 mg, Oral, Every 24 Hours Scheduled   Start Date: August 31, 2023            Changes to Medications        Instructions Start Date   clopidogrel 75 MG tablet  Commonly known as: PLAVIX  What changed: additional instructions   75 mg, Oral, Daily      lisinopril 2.5 MG tablet  Commonly known as: PRINIVIL,ZESTRIL  What changed:   medication strength  how much to take   2.5 mg, Oral, Daily             Continue These Medications        Instructions Start Date   amLODIPine 5 MG tablet  Commonly known as: NORVASC   5 mg, Oral, Daily      aspirin 81 MG chewable tablet   81 mg, Oral, Daily, LAST DOSE TO BE 8/19/23 PER DR VELASQUEZ INSTRUCTIONS      cyanocobalamin 500 MCG tablet  Commonly known as: VITAMIN B-12    "250 mcg, Oral, 2 Times Daily, TAKES HALF A TABLET BID      glimepiride 2 MG tablet  Commonly known as: AMARYL   2 mg, Oral, Daily      levothyroxine 75 MCG tablet  Commonly known as: SYNTHROID, LEVOTHROID   1 tablet, Oral, Before Breakfast      Magnesium Oxide -Mg Supplement 400 (240 Mg) MG tablet   400 mg, Oral, Daily      metFORMIN 500 MG tablet  Commonly known as: GLUCOPHAGE   500 mg, Oral, 2 Times Daily With Meals      nitroglycerin 0.4 MG SL tablet  Commonly known as: NITROSTAT   0.4 mg, Oral, Every 5 Minutes PRN      omeprazole 20 MG capsule  Commonly known as: priLOSEC   1 capsule, Oral, Daily      rosuvastatin 20 MG tablet  Commonly known as: CRESTOR   20 mg, Oral, Nightly             Stop These Medications      ampicillin 500 MG capsule  Commonly known as: PRINCIPEN     cephalexin 500 MG capsule  Commonly known as: KEFLEX              Allergies   Allergen Reactions    Latex, Natural Rubber Rash    Pantoprazole Other (See Comments)     \"MADE ME FEEL BAD\"    Penicillins Dizziness           Sulfa Antibiotics Other (See Comments)     \"FEEL BAD\"       Discharge Disposition:  Home or Self Care    Diet:  Hospital:  Diet Order   Procedures    Diet: Cardiac Diets, Diabetic Diets; Healthy Heart (2-3 Na+); Consistent Carbohydrate; Texture: Regular Texture (IDDSI 7); Fluid Consistency: Thin (IDDSI 0)       Discharge Activity:   Activity Instructions       Discharge Activity      Take medications as needed and as prescribed.    Apply bacitracin ointment to tip of penis twice daily.    Okay to take over-the-counter pain medication as needed for discomfort.  Do not take greater than 4 g (4000 mg) of Tylenol in a 24-hour period.    Drink plenty of fluids until urine is clear.     Start normal activities in twenty-four (24) hours.  Limit heavy lifting and strenuous activity for 5-7 days.    Wound Care and Hygiene: No restrictions, start normal routine.    Anesthesia as well as pain medication can cause constipation, take " over the counter stool softener as needed.    What to Expect after Surgery  Mild pain or burning with voiding.  Frequency or urgency with urination.  Bladder cramps.  Minimal bleeding with voiding.    Call urology office or go to ER if  Passing clots in urine preventing bladder emptying  Severe pain not controlled by oral medication  Temperature above 101.5 degrees  Inability to urinate within eight (8) hours after surgery    After catheter removal  It is common to have blood tinged urine for 3-5 days.  It is common to have urgency with urination.    Other Instructions  Burning with urination is very common.  Drink plenty of water to limit this feeling.    Call urology office with any questions or concerns.    Gradually Increase Activity Until at Pre-Hospitalization Level              CODE STATUS:  Code Status and Medical Interventions:   Ordered at: 08/25/23 7204     Code Status (Patient has no pulse and is not breathing):    CPR (Attempt to Resuscitate)     Medical Interventions (Patient has pulse or is breathing):    Full         Future Appointments   Date Time Provider Department Center   11/6/2023  1:20 PM Maria L Mcintosh MD [facility] ETN [facility]       Additional Instructions for the Follow-ups that You Need to Schedule       Discharge Follow-up with PCP   As directed       Currently Documented PCP:    Mary Moya APRN    PCP Phone Number:    [phone]     Follow Up Details: Hospital discharge follow up left sided hydronephrosis s/p stent placement, urinary retention s/p TURP, CAD        Discharge Follow-up with Specified Provider: Dr. Hawk cardiology; 3 Weeks   As directed      To: Dr. Hawk cardiology   Follow Up: 3 Weeks   Follow Up Details: Hospital discharge follow up chest pain, CAD        Discharge Follow-up with Specified Provider: Dr. Rosa; cysto and stent removal; 3 Weeks   As directed      To: Dr. Rosa; cysto and stent removal   Follow Up: 3 Weeks   Follow Up Details: [phone]                 Pertinent  and/or Most Recent Results     PROCEDURES:      Debbie Rosa MD   Physician  Urology     Op Note      Signed     Date of Service: 08/25/23 1455  Creation Time: 08/25/23 1653  Case Time: Procedures: Surgeons:   08/25/23 1455 MEATOTOMY, cysotscopy, transurethral resection of prostate    Debbie Rosa MD               Signed         Preoperative diagnosis  Meatal stenosis, BPH with LUTS     Postoperative diagnosis  Meatal stenosis, BPH with LUTS     Procedure performed  Cystoscopy, transurethral resection of prostate  Meatotomy     Attending surgeon  Debbie Rosa MD      Anesthesia  General     EBL  100 mL     Complications  None     Specimen  Prostate chips     Findings  Tight meatus; opened with meatotomy; prostatomegaly with bilateral coapting lateral lobes and median lobe; resected wide open channel.  Several bladder diverticula; largest left posterior bladder wall with some small bladder stones; questionable papillary carpeting at diverticular wall resected and fulgurated; unable to obtain distinct specimen aside from prostate chips; no other lesions or suspicious tumors.  Bilateral orthotopic ureters not involved in the resection     Indications  83 y.o. male agreed to undergo the above named procedure after discussion of the alternatives, risks and benefits.   Informed consent was obtained.       Procedure  After informed consent was obtained the patient was taken back to the operating suite.  He was given anesthesia care, and was placed in dorsal lithotomy and then prepped and draped in the usual standard sterile manner.  Timeout was performed.  Penile block was injected using 10 cc 0.25% plain Marcaine.  Upon inspection patient was noted to have a very meatus which would not accommodate the cystoscope.  A straight mosquito clamp was placed on the ventral meatus and clamped for 5 minutes. This was removed and the area was cut with tenotomy scissors to develop an  adequate meatal opening.  Once the meatus was noted to be adequate, 4-0 chromic suture was used to involute the urethral mucosa to the meatal skin edge in a running manner from dorsum to ventrum on both sides.       Cystoscopy was then performed.  21 Wolof rigid cystoscope was able to be inserted through the meatus without difficulty.  This scope passed through the urethra, there were no urethral abnormalities noted.     The prostatic urethra showed signs of partial obstruction with enlarged lateral prostatic lobes and median lobe. Upon entering the bladder, pan cystoscopy was performed in a   360 degree fashion, the bladder was of large capacity.  There were 2 primary large diverticulum 1 at posterior wall and one at left lateral wall.  Both   ureteral orifices were seen in the expected location and with clear   urine efflux.  We then left the bladder full and removed our   cystoscope and introduced a 25-Wolof rigid resectoscope using the   visual obturator, and once again under direct vision.  Once we   entered into the bladder, this was switched to the working element of   the resectoscope and then we performed judicious transurethral   resection of the prostate with great attention staying just proximal   from the verumontanum and also resecting just enough tissue to open   up his channel.  After this, we hand irrigated the prostate chips out   and then performed judicious electrocautery to achieve hemostasis.  The bladder stones were irrigated out of the left lateral diverticulum.  At this point, there was a papillary lesion on the posterior lip of the diverticulum.  This was resected and fulgurated, attempts to irrigate out just the specimen unfortunately were unsuccessful and separate pathologic specimen was not able to be sent.  Final inspection of the prostatic bed revealed adequate hemostasis.  Bilateral orthotopic ureters uninvolved in the resection.  The procedure was concluded, and the resectoscope  was removed.  A 22-  Vietnamese Marroquin catheter with 3 way ports were then in place with 30 mL   of sterile water in the balloon.  The patient was in stable condition   throughout and he was transferred to PACU for recovery.       The meatus was then inspected one last time and there was no significant bleeding that was encountered. The area was covered with Bacitracin ointment without bandages. Patient was then awaken from anesthesia without complications and transferred to the Recovery Room (RR). The patient arrived to the RR in stable condition and without complications.        Signed:  Debbie Rosa MD  08/25/23  16:53 EDT                     Debbie Rosa MD   Physician  Urology     Op Note      Signed     Date of Service: 08/26/23 1740  Creation Time: 08/26/23 1841  Case Time: Procedures: Surgeons:   08/26/23 1740 CYSTOSCOPY WITH CLOT EVACUATION, bladder fulguration   CYSTOSCOPY RETROGRADE PYELOGRAM AND STENT INSERTION, left    Debbie Rosa MD O'Bryan, Brittany, MD               Signed         Preoperative diagnosis  Hematuria, clot retention, left hydronephrosis     Postoperative diagnosis  Hematuria, clot retention, left hydronephrosis     Procedure performed  Cystoscopy, clot evacuation, bladder fulguration, left retrograde pyelogram, ureteral stent insertion     Attending surgeon  Debbie Rosa MD      Anesthesia  General     EBL  0 mL     Complications  None     Specimen  None     Findings  Approximately 500 mL of organized clot; primarily active oozing from the bladder anterior bladder neck; prostatic bed extensively fulgurated, total area fulguration approximately 3 cm  Adequate hemostasis at conclusion of case  Right ureteral orifice appears normal; clot protruding from left ureteral orifice; left retrograde pyelogram with moderate hydroureteronephrosis  6 x 26 stent no string  22 Vietnamese three-way catheter with 30 cc balloon     Indications  83 y.o. male agreed to undergo the above  named procedure after discussion of the alternatives, risks and benefits.   Informed consent was obtained.       Procedure  After informed consent was obtained, the patient was taken back to the  operating suite where general anesthesia was administered.  Once the patient  was adequately anesthetized, he was placed in the dorsal lithotomy position.  The genitals were prepped and draped in the normal sterile fashion.  The 27 Iranian resectoscope with visual obturator was inserted to the bladder without difficulty.  Cystoscopy was attempted but only clot was visualized at this point.  Clot evacuation was then performed using the resectoscope sheath and Gail syringe.  Approximately 500 mL of old dark clot was irrigated out without significant difficulty.  The large left bladder diverticulum also contain clot which was irrigated out.  Once irrigant was clear the visual obturator was inserted through the resectoscope sheath and the bladder was able to be visualized entirely.  No further clot was appreciated.       The bladder mucosa was inspected systematically.  There was no active bleeding within the bladder.  Prior resection site at the left anterior diverticulum was not actively bleeding.  There was significant area of the ooze at the anterior bladder neck and left lateral prostatic bed.  The visual obturator was exchanged for the working element of the resectoscope and plasma button was utilized to control the bleeding.  The bleeding vessels were extensively fulgurated at the anterior bladder neck.  Also, the prostatic bed particularly on the left lateral side was extensively fulgurated.  Careful inspection revealed other distinct areas to fulgurate.  Total area of fulguration approximately 3 cm.  Final inspection did not again reveal any active area of bleeding.  Once bleeding had been controlled.  The resectoscope was removed and the cystoscope inserted.  At this point the trigone was inspected.  The right  ureteral orifice appeared normal.  The left ureteral orifice had clot protruding from it.  A sensor wire was threaded up through the left ureteral orifice.  Next a Pollick catheter was placed over the sensor wire into the distal ureter and wire removed.  Retrograde pyelogram revealed moderate hydroureteronephrosis.  The wire was then replaced.  Pollick reduced.  Finally a 6 x 26 stent no string was placed over the wire under fluoroscopic guidance.  Upon retrieval of the wire there was proximal coil noted within the upper pole and adequate distal coil in the bladder.  The bladder and prostatic urethra were then again thoroughly inspected filling and emptying the bladder and there was no active bleeding noted.  The scope was removed with the bladder remaining full and a 22-Frisian Marroquin  catheter was inserted into the patient's bladder without difficulty and  secured with 30 mL in the balloon.  Catheter was placed on continuous bladder irrigation.  At this point the procedure was deemed  terminated.  The patient was placed back in the supine position, awoken from  anesthesia, and transferred to the PACU in good condition.        Signed:  Debbie Rosa MD  23  18:42 EDT                  CARDIAC CATHETERIZATION PROCEDURE REPORT     Patient: Andrea Odom  : 1940  MRN: [id number]     Procedure Date:  23     Referring Physician:   Dr. Hensley     Interventional Cardiologist:   Saúl Berry MD     Indication:  Chest pain with ST depression on ECG and elevated troponin consistent with non-ST ovation MI.     Clinical Presentation:  83-year-old gentleman with known CAD, status post previous CABG and multiple PCI's, previous TAVR, status post TURB few days ago, was having anterior chest discomfort with ST depression and elevated troponin consistent with non-STEMI.  He is here for cardiac catheterization and possible intervention.     Procedure performed:  Ultrasound-guided right common femoral  artery access  Left heart catheterization  Selective left right coronary angiography  Left subclavian artery angiography  Selective LIMA angiography  Mynx closure device deployment     Access Sites:  Right common femoral artery     Findings:  1. Coronary Artery Anatomy:  Dominance: RCA  Left Main: Large vessel, has no significant stenosis.  Left Anterior Descending: Occluded at the ostium.  Fills via LIMA graft in an antegrade and retrograde fashion.  Circumflex Artery: Large vessel with previous stents in the proximal segment extending into OM1 and OM 2.  OM1 stent is widely patent.  OM 2 stent is chronically occluded.  Right Coronary Artery: Chronically occluded at the ostium and was not engaged on this study.  Distal right coronary artery was seen filling via left-to-right collaterals.     Left subclavian artery angiography: Previous stent was noted and is widely patent.     LIMA angiography: ABDIEL is widely patent with some haziness at the ostium related to vessels overlap.  LIMA anastomosis site to the LAD is widely patent.  LAD past MATA insertion is patent with diffuse luminal regularities.     2. Hemodynamics:  Left Ventricle: 149 mmHg    LVEDP: 30 mmHg  Aorta: 145/53 mmHg     3. Left Ventriculogram: Was not performed        Procedure Details:  Informed consent was obtained with an explanation of the risks, benefits and alternatives of the procedure. The patient was brought to the Cardiac Catheterization Laboratory and was prepped and draped in a standard sterile fashion. Moderate sedation with Fentanyl and Versed was administered by the circulating nurse. Lidocaine 2% was used to anesthetize the right groin.  Right common femoral artery access was obtained under fluoroscopy and ultrasound guidance and a 6 Solomon Islander sheath was advanced using modified syndicate technique.  We used 6 Solomon Islander JL 3.5 diagnostic catheter to selectively engage the left coronary artery and images were obtained in appropriate views.  We  used 6 Kittitian JR4 diagnostic catheter to engage the left subclavian artery as well as LIMA and images were obtained in appropriate views.  We crossed the TAVR valve using JR4 catheter over guidewire for LV pressure measurement and pullback.  Catheter was removed over the guidewire.  Mynx device was deployed with excellent hemostasis obtained.     Patient tolerated the procedure well without any complications, and transferred to the post procedure area for recovery in a stable condition.     Total amount of contrast used: As per procedure log     Complications:  None.     Estimated Blood Loss:  Minimal.     Conclusions:  Right dominant coronary circulation  Severe native coronary disease  Occluded ostial LAD.  LAD fills via patent LIMA graft.  Occluded previous OM 2 stent  Patent previous left circumflex into OM1 stent.  Patent LIMA to LAD  SVG to the right coronary artery and native RCA are known to be chronically occluded and were not engaged on this study.  Highly elevated LVEDP, 30 mmHg.  No significant bioprosthetic aortic valve stenosis.     Recommendations:   Continue/optimize CAD medical therapy.  Optimize fluid status.   CAD risk factor modification.          LAB RESULTS:      Lab 08/30/23  1447 08/30/23  0418 08/29/23  0309 08/28/23  0408 08/27/23  0423 08/26/23  1254 08/26/23  0425   WBC  --  6.65 6.03 6.49 8.00 11.55* 11.35*   HEMOGLOBIN 9.5* 9.9* 7.7* 9.2* 10.6* 11.4* 11.3*   HEMATOCRIT 29.3* 29.5* 24.0* 28.3* 32.5* 32.7* 33.9*   PLATELETS  --  149 148 158 158 210 202   NEUTROS ABS  --   --  4.19 4.03  --   --  9.48*   IMMATURE GRANS (ABS)  --   --  0.02 0.01  --   --  0.05   LYMPHS ABS  --   --  1.10 1.55  --   --  0.88   MONOS ABS  --   --  0.45 0.66  --   --  0.92*   EOS ABS  --   --  0.23 0.21  --   --  0.00   MCV  --  90.5 95.2 96.9 95.6 92.4 93.4         Lab 08/29/23  0309 08/28/23  0408 08/27/23  0423 08/26/23  1254 08/26/23  0425   SODIUM 136 137 131* 127* 125*   POTASSIUM 4.1 4.1 5.0 4.5 4.5    CHLORIDE 107 106 104 93* 92*   CO2 22.3 21.2* 17.2* 24.7 22.6   ANION GAP 6.7 9.8 9.8 9.3 10.4   BUN 14 14 19 22 21   CREATININE 1.13 1.12 1.30* 1.56* 1.18   EGFR 64.5 65.2 54.5* 43.8* 61.2   GLUCOSE 148* 82 170* 162* 185*   CALCIUM 8.1* 8.4* 8.6 8.9 8.6   MAGNESIUM 1.8 2.3  --   --  1.9   PHOSPHORUS 2.0* 2.2*  --   --   --    HEMOGLOBIN A1C  --  5.70*  --   --   --          Lab 08/29/23  0309 08/28/23  0408 08/26/23  0425   TOTAL PROTEIN 4.9* 5.3* 5.9*   ALBUMIN 3.1* 3.4* 3.8   GLOBULIN  --   --  2.1   ALT (SGPT) 11 12 9   AST (SGOT) 18 27 19   BILIRUBIN 0.2 0.2 0.6   BILIRUBIN DIRECT <0.2 <0.2  --    ALK PHOS 64 67 73         Lab 08/29/23  0309 08/29/23  0100 08/27/23  1156 08/27/23  0933   PROBNP  --   --   --  855.7   HSTROP T 96* 87* 105* 79*         Lab 08/28/23  0408   CHOLESTEROL 119   LDL CHOL 57   HDL CHOL 46   TRIGLYCERIDES 81         Lab 08/25/23  1228   ABO TYPING O   RH TYPING Positive   ANTIBODY SCREEN Positive         Brief Urine Lab Results  (Last result in the past 365 days)        Color   Clarity   Blood   Leuk Est   Nitrite   Protein   CREAT   Urine HCG        08/19/23 2351 Yellow   Clear   Trace   Moderate (2+)   Negative   Trace                 Microbiology Results (last 10 days)       ** No results found for the last 240 hours. **            XR Chest 2 View    Result Date: 8/18/2023  Impression:   1. Status post CABG and TAVR 2. No active disease     Chilo Nicole MD       Electronically Signed and Approved By: Chilo Nicole MD on 8/18/2023 at 15:38             XR Chest 1 View    Result Date: 8/26/2023  Impression:   No acute infiltrate is appreciated.     Please note that portions of this note were completed with a voice recognition program.  FÁTIMA HERNANDEZ JR, MD       Electronically Signed and Approved By: FÁTIMA HERNANDEZ JR, MD on 8/26/2023 at 2:13              XR Chest 1 View    Result Date: 8/19/2023  Impression:  No acute cardiopulmonary process identified.       MILEY BONDS MD        Electronically Signed and Approved By: MILEY BONDS MD on 8/19/2023 at 22:38             FL C Arm During Surgery    Result Date: 8/26/2023  Impression:   Left ureteral stent insertion is noted, as discussed.     Please note that portions of this note were completed with a voice recognition program.  FÁTIMA HERNANDEZ JR, MD       Electronically Signed and Approved By: FÁTIMA HERNANDEZ JR, MD on 8/26/2023 at 19:29              CT Abdomen Pelvis Stone Protocol    Result Date: 8/26/2023  Impression:   1. Marroquin catheter is within the bladder and surrounded by high density material compatible with hemorrhagic clot.  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 There is mild hydroureteronephrosis without obstructing calculus.  Findings likely related to obstruction secondary to hemorrhage within the bladder 3. No acute intra-abdominal or intrapelvic abnormality otherwise noted on motion limited imaging.     CHLOE GROVE MD       Electronically Signed and Approved By: CHLOE GROVE MD on 8/26/2023 at 17:14              Results for orders placed during the hospital encounter of 08/10/22    Duplex Venous Lower Extremity - Bilateral CV-READ    Interpretation Summary  · Normal bilateral lower extremity venous duplex scan.      Results for orders placed during the hospital encounter of 08/10/22    Duplex Venous Lower Extremity - Bilateral CV-READ    Interpretation Summary  · Normal bilateral lower extremity venous duplex scan.      Results for orders placed during the hospital encounter of 08/25/23    Adult Transthoracic Echo Complete w/ Color, Spectral and Contrast if necessary per protocol    Interpretation Summary    Left ventricular ejection fraction appears to be 51 - 55%.    Left ventricular diastolic function is consistent with (grade I) impaired relaxation.    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      Labs Pending at Discharge:        Time spent on  Discharge including face to face service: Greater than 35 minutes    Electronically signed by Dane Malone MD, 08/30/23, 3:34 PM EDT.

## 2023-08-30 NOTE — PLAN OF CARE
Problem: Adult Inpatient Plan of Care  Goal: Plan of Care Review  Recent Flowsheet Documentation  Taken 8/30/2023 1559 by Judith Benítez RN  Plan of Care Reviewed With: patient  Outcome Evaluation: Pt discharging. Judith Benítez RN   Goal Outcome Evaluation:  Plan of Care Reviewed With: patient           Outcome Evaluation: Pt discharging. Judith Benítez RN

## 2023-08-30 NOTE — OUTREACH NOTE
Prep Survey      Flowsheet Row Responses   [facility] facility patient discharged from? [facility]   Is LACE score < 7 ? No   Eligibility Readm Mgmt   Discharge diagnosis BPH (benign prostatic hyperplasia), NSTEMI (non-ST elevated myocardial infarction)   Does the patient have one of the following disease processes/diagnoses(primary or secondary)? Acute MI (STEMI,NSTEMI)   Does the patient have Home health ordered? No   Is there a DME ordered? No   Prep survey completed? Yes            Marizol CARTER - Registered Nurse

## 2023-08-30 NOTE — THERAPY TREATMENT NOTE
Acute Care - Physical Therapy Treatment Note  [facility]     Patient Name: Andrea Odom  : 1940  MRN: [id number]  Today's Date: 2023      Visit Dx:     ICD-10-CM ICD-9-CM   1. S/P TURP  Z90.79 V45.89   2. Stricture of male urethra, unspecified stricture type  N35.919 598.9   3. Benign prostatic hyperplasia with urinary hesitancy  N40.1 600.01    R39.11 788.64   4. Gross hematuria  R31.0 599.71   5. Hydronephrosis, unspecified hydronephrosis type  N13.30 591   6. Unstable angina  I20.0 411.1   7. ACS (acute coronary syndrome)  I24.9 411.1   8. NSTEMI (non-ST elevated myocardial infarction)  I21.4 410.70   9. Decreased activities of daily living (ADL)  Z78.9 V49.89   10. Difficulty walking  R26.2 719.7     Patient Active Problem List   Diagnosis    Acute chest pain    NSTEMI (non-ST elevated myocardial infarction)    ACS (acute coronary syndrome)    Anxiety    Aortic stenosis    Arteriosclerosis of coronary artery    B12 deficiency    Benign essential HTN    Bilateral carotid artery stenosis    Carotid artery disease    Diabetes mellitus type 2 in nonobese    Dyslipidemia    Epigastric pain    GERD (gastroesophageal reflux disease)    Mixed hyperlipidemia    Paralysis of glottis    Presence of aortocoronary bypass graft    Status post carotid endarterectomy    Thyroid disease    Unstable angina    Vertigo    Voice hoarseness    Acute cystitis with hematuria    Hyponatremia    Near syncope    Weakness    Stricture of male urethra    Benign prostatic hyperplasia with urinary hesitancy    BPH (benign prostatic hyperplasia)    S/P TURP (status post transurethral resection of prostate)    S/P TURP    Gross hematuria    Hydronephrosis     Past Medical History:   Diagnosis Date    Anxiety     Arthritis     BPH (benign prostatic hyperplasia)     Coronary artery disease     HAD STENTS PLACED. FOLLOWED BY DR HANSNO. DECREASED ACTIVITY USES CANE    Diabetes mellitus     Frequent UTI     GERD (gastroesophageal  reflux disease)     Hyperlipidemia     Hypertension     Old MI (myocardial infarction)     SAID THINKS ABOUT 4-5 YEARS AGO    Stricture of male urethra     Stroke     SAYS ABOUT 4-5 YEARS AGO NO RESIDUAL     Past Surgical History:   Procedure Laterality Date    CARDIAC CATHETERIZATION      stent placed    CARDIAC CATHETERIZATION N/A 08/11/2022    Procedure: Left Heart Cath;  Surgeon: Allison Lazcano MD;  Location: [facility] INVASIVE LOCATION;  Service: Cardiovascular;  Laterality: N/A;    CARDIAC CATHETERIZATION N/A 08/11/2022    Procedure: Possible Percutaneous Coronary Intervention;  Surgeon: Allison Lazcano MD;  Location: [facility] CATH INVASIVE LOCATION;  Service: Cardiovascular;  Laterality: N/A;    CARDIAC CATHETERIZATION N/A 8/27/2023    Procedure: Left Heart Cath;  Surgeon: Saúl Berry MD;  Location: [facility] INVASIVE LOCATION;  Service: Cardiology;  Laterality: N/A;    CARDIAC SURGERY      2 VESSEL CABG SAYS AROUND 35-40    CYSTOSCOPY TRANSURETHRAL RESECTION OF PROSTATE N/A 8/25/2023    Procedure: MEATOTOMY, cysotscopy, transurethral resection of prostate;  Surgeon: Debbie Rosa MD;  Location: [facility];  Service: Urology;  Laterality: N/A;    CYSTOSCOPY WITH CLOT EVACUATION N/A 8/26/2023    Procedure: CYSTOSCOPY WITH CLOT EVACUATION, bladder fulguration;  Surgeon: Debbie Rosa MD;  Location: [facility] OR;  Service: Urology;  Laterality: N/A;    CYSTOSCOPY, RETROGRADE PYELOGRAM AND STENT INSERTION Left 8/26/2023    Procedure: CYSTOSCOPY RETROGRADE PYELOGRAM AND STENT INSERTION, left;  Surgeon: Debbie Rosa MD;  Location: [facility];  Service: Urology;  Laterality: Left;    SHOULDER SURGERY      RCR UNSURE OF SIDE     PT Assessment (last 12 hours)       PT Evaluation and Treatment       Row Name 08/30/23 1227          Physical Therapy Time and Intention    Subjective Information complains of;weakness;fatigue;pain  -DK     Document Type therapy note (daily  note)  -DK     Mode of Treatment individual therapy;physical therapy  -DK     Patient Effort good  -DK     Symptoms Noted During/After Treatment fatigue  -DK     Comment Pt reports feeling better, hoping to be discharged today.  -       Row Name 08/30/23 1227          Pain    Pretreatment Pain Rating 1/10  -DK     Posttreatment Pain Rating 1/10  -DK     Pain Location generalized  -DK     Pain Location - abdomen  -DK     Pain Intervention(s) Repositioned;Ambulation/increased activity;Distraction;Therapeutic presence  -       Row Name 08/30/23 1227          Cognition    Affect/Mental Status (Cognition) WFL  -DK     Orientation Status (Cognition) oriented x 4  -DK     Follows Commands (Cognition) WFL  -DK     Cognitive Function WFL  -DK     Personal Safety Interventions gait belt;nonskid shoes/slippers when out of bed;supervised activity  -       Row Name 08/30/23 1227          Bed Mobility    Bed Mobility supine-sit-supine;scooting/bridging  -DK     Scooting/Bridging [place] (Bed Mobility) standby assist  -DK     Supine-Sit [place] (Bed Mobility) standby assist  -DK     Sit-Supine [place] (Bed Mobility) standby assist  -DK     Supine-Sit-Supine [place] (Bed Mobility) standby assist  -DK     Assistive Device (Bed Mobility) bed rails  -       Row Name 08/30/23 1227          Transfers    Transfers sit-stand transfer;stand-sit transfer  -       Row Name 08/30/23 1227          Sit-Stand Transfer    Sit-Stand [place] (Transfers) standby assist;contact guard;1 person assist  -DK     Assistive Device (Sit-Stand Transfers) walker, front-wheeled  -       Row Name 08/30/23 1227          Stand-Sit Transfer    Stand-Sit [place] (Transfers) standby assist;contact guard;1 person assist  -     Assistive Device (Stand-Sit Transfers) walker, front-wheeled  -       Row Name 08/30/23 1227          Gait/Stairs (Locomotion)    Gait/Stairs Locomotion gait/ambulation independence;gait/ambulation  assistive device;distance ambulated;gait pattern  -DK     [place] Level (Gait) standby assist;contact guard;1 person assist  -DK     Assistive Device (Gait) cane, straight  -DK     Distance in Feet (Gait) 180  -DK     Pattern (Gait) step-through  -DK     Deviations/Abnormal Patterns (Gait) festinating/shuffling;stride length decreased  -DK     Bilateral Gait Deviations forward flexed posture  -DK     Comment, (Gait/Stairs) Pt ambulated on room air with his hurry cane.  Pt returned to bed post treatment.  -       Row Name 08/30/23 1227          Safety Issues, Functional Mobility    Impairments Affecting Function (Mobility) balance;endurance/activity tolerance;pain;strength;range of motion (ROM)  -       Row Name 08/30/23 1227          Balance    Balance Assessment sitting static balance;sitting dynamic balance;standing static balance;standing dynamic balance  -DK     Static Sitting Balance standby assist  -     Dynamic Sitting Balance standby assist  -DK     Position, Sitting Balance unsupported;sitting edge of bed  -DK     Static Standing Balance standby assist;contact guard;1-person assist  -DK     Dynamic Standing Balance standby assist;contact guard;1-person assist  -DK     Position/Device Used, Standing Balance cane, straight  -DK     Balance Interventions standing;dynamic;tandem gait  -       Row Name 08/30/23 1227          Motor Skills    Motor Skills --  therapeutic exercises  -     Coordination WFL  -     Therapeutic Exercise hip;knee;ankle  -       Row Name 08/30/23 1227          Hip (Therapeutic Exercise)    Hip (Therapeutic Exercise) AROM (active range of motion)  -     Hip AROM (Therapeutic Exercise) bilateral;flexion;extension;aBduction;aDduction  -       Row Name 08/30/23 1227          Knee (Therapeutic Exercise)    Knee (Therapeutic Exercise) AROM (active range of motion)  -     Knee AROM (Therapeutic Exercise) bilateral;flexion;extension;LAQ (long arc quad);sitting;15  repititions  -DK     Knee AAROM (Therapeutic Exercise) --  -DK       Row Name 08/30/23 1227          Ankle (Therapeutic Exercise)    Ankle (Therapeutic Exercise) AROM (active range of motion)  -DK     Ankle AROM (Therapeutic Exercise) bilateral;dorsiflexion;plantarflexion;sitting;20 repititions  -DK     Ankle AAROM (Therapeutic Exercise) --  -DK       Row Name             Wound 08/25/23 1526 urethral meatus Other (comment)    Wound - Properties Group Placement Date: 08/25/23  -HK Placement Time: 1526  -HK Location: urethral meatus  -HK Primary Wound Type: Other  -HK, Scope instertion site     Retired Wound - Properties Group Placement Date: 08/25/23  -HK Placement Time: 1526  -HK Location: urethral meatus  -HK Primary Wound Type: Other  -HK, Scope instertion site     Retired Wound - Properties Group Date first assessed: 08/25/23  -HK Time first assessed: 1526  -HK Location: urethral meatus  -HK Primary Wound Type: Other  -HK, Scope instertion site       Row Name 08/30/23 1227          Plan of Care Review    Plan of Care Reviewed With patient  -DK     Progress improving  -DK       Row Name 08/30/23 1227          Positioning and Restraints    Pre-Treatment Position in bed  -DK     Post Treatment Position bed  -DK     In Bed supine;call light within reach;encouraged to call for assist;side rails up x2  -DK       Row Name 08/30/23 1227          Therapy Assessment/Plan (PT)    Rehab Potential (PT) good, to achieve stated therapy goals  -DK     Criteria for Skilled Interventions Met (PT) skilled treatment is necessary  -DK     Therapy Frequency (PT) daily  -DK     Problem List (PT) problems related to;balance;mobility;range of motion (ROM);strength;pain  -DK     Activity Limitations Related to Problem List (PT) unable to ambulate safely;unable to transfer safely  -DK       Row Name 08/30/23 1227          Progress Summary (PT)    Progress Toward Functional Goals (PT) progress toward functional goals is good  -DK                User Key  (r) = Recorded By, (t) = Taken By, (c) = Cosigned By      Initials Name Provider Type    Olivia Ac, RN Registered Nurse    Ale Crawford PTA Physical Therapist Assistant                      PT Recommendation and Plan  Planned Therapy Interventions (PT): balance training, bed mobility training, gait training, home exercise program, strengthening, transfer training  Therapy Frequency (PT): daily  Progress Summary (PT)  Progress Toward Functional Goals (PT): progress toward functional goals is good  Plan of Care Reviewed With: patient  Progress: improving   Outcome Measures       Row Name 08/30/23 1226 08/29/23 1300          How much help from another person do you currently need...    Turning from your back to your side while in flat bed without using bedrails? 4  -DK 4  -DP     Moving from lying on back to sitting on the side of a flat bed without bedrails? 4  -DK 4  -DP     Moving to and from a bed to a chair (including a wheelchair)? 3  -DK 3  -DP     Standing up from a chair using your arms (e.g., wheelchair, bedside chair)? 4  -DK 3  -DP     Climbing 3-5 steps with a railing? 3  -DK 3  -DP     To walk in hospital room? 3  -DK 3  -DP     AM-PAC 6 Clicks Score (PT) 21  -DK 20  -DP        Functional Assessment    Outcome Measure Options AM-PAC 6 Clicks Basic Mobility (PT)  -DK AM-PAC 6 Clicks Basic Mobility (PT)  -DP               User Key  (r) = Recorded By, (t) = Taken By, (c) = Cosigned By      Initials Name Provider Type    Ale Crawford PTA Physical Therapist Assistant    Sam Cooper, PT Physical Therapist                     Time Calculation:    PT Charges       Row Name 08/30/23 1235             Time Calculation    PT Received On 08/30/23  -PÉREZ      PT Goal Re-Cert Due Date 09/07/23  -DK         Timed Charges    73379 - PT Therapeutic Exercise Minutes 12  -DK      29850 - Gait Training Minutes  8  -DK      52694 - PT Therapeutic Activity Minutes 6  -DK         Total  Minutes    Timed Charges Total Minutes 26  -DK       Total Minutes 26  -DK                User Key  (r) = Recorded By, (t) = Taken By, (c) = Cosigned By      Initials Name Provider Type    Ale Crawford PTA Physical Therapist Assistant                  Therapy Charges for Today       Code Description Service Date Service Provider Modifiers Qty    68337395932 HC PT THER PROC EA 15 MIN 8/30/2023 Ale Fox PTA GP 1    69874491269 HC GAIT TRAINING EA 15 MIN 8/30/2023 Ale Fox PTA GP 1            PT G-Codes  Outcome Measure Options: AM-PAC 6 Clicks Basic Mobility (PT)  AM-PAC 6 Clicks Score (PT): 21  AM-PAC 6 Clicks Score (OT): 21    Ale Fox PTA  8/30/2023

## 2023-08-30 NOTE — THERAPY TREATMENT NOTE
Patient Name: Andrea Odom  : 1940    MRN: [id number]                              Today's Date: 2023       Admit Date: 2023    Visit Dx:     ICD-10-CM ICD-9-CM   1. S/P TURP  Z90.79 V45.89   2. Stricture of male urethra, unspecified stricture type  N35.919 598.9   3. Benign prostatic hyperplasia with urinary hesitancy  N40.1 600.01    R39.11 788.64   4. Gross hematuria  R31.0 599.71   5. Hydronephrosis, unspecified hydronephrosis type  N13.30 591   6. Unstable angina  I20.0 411.1   7. ACS (acute coronary syndrome)  I24.9 411.1   8. NSTEMI (non-ST elevated myocardial infarction)  I21.4 410.70   9. Decreased activities of daily living (ADL)  Z78.9 V49.89   10. Difficulty walking  R26.2 719.7     Patient Active Problem List   Diagnosis    Acute chest pain    NSTEMI (non-ST elevated myocardial infarction)    ACS (acute coronary syndrome)    Anxiety    Aortic stenosis    Arteriosclerosis of coronary artery    B12 deficiency    Benign essential HTN    Bilateral carotid artery stenosis    Carotid artery disease    Diabetes mellitus type 2 in nonobese    Dyslipidemia    Epigastric pain    GERD (gastroesophageal reflux disease)    Mixed hyperlipidemia    Paralysis of glottis    Presence of aortocoronary bypass graft    Status post carotid endarterectomy    Thyroid disease    Unstable angina    Vertigo    Voice hoarseness    Acute cystitis with hematuria    Hyponatremia    Near syncope    Weakness    Stricture of male urethra    Benign prostatic hyperplasia with urinary hesitancy    BPH (benign prostatic hyperplasia)    S/P TURP (status post transurethral resection of prostate)    S/P TURP    Gross hematuria    Hydronephrosis     Past Medical History:   Diagnosis Date    Anxiety     Arthritis     BPH (benign prostatic hyperplasia)     Coronary artery disease     HAD STENTS PLACED. FOLLOWED BY DR HANSON. DECREASED ACTIVITY USES CANE    Diabetes mellitus     Frequent UTI     GERD (gastroesophageal reflux  disease)     Hyperlipidemia     Hypertension     Old MI (myocardial infarction)     SAID THINKS ABOUT 4-5 YEARS AGO    Stricture of male urethra     Stroke     SAYS ABOUT 4-5 YEARS AGO NO RESIDUAL     Past Surgical History:   Procedure Laterality Date    CARDIAC CATHETERIZATION      stent placed    CARDIAC CATHETERIZATION N/A 08/11/2022    Procedure: Left Heart Cath;  Surgeon: Allison Lazcano MD;  Location: [facility] INVASIVE LOCATION;  Service: Cardiovascular;  Laterality: N/A;    CARDIAC CATHETERIZATION N/A 08/11/2022    Procedure: Possible Percutaneous Coronary Intervention;  Surgeon: Allison Lazcano MD;  Location: [facility] CATH INVASIVE LOCATION;  Service: Cardiovascular;  Laterality: N/A;    CARDIAC CATHETERIZATION N/A 8/27/2023    Procedure: Left Heart Cath;  Surgeon: Saúl Berry MD;  Location: [facility] INVASIVE LOCATION;  Service: Cardiology;  Laterality: N/A;    CARDIAC SURGERY      2 VESSEL CABG SAYS AROUND 35-40    CYSTOSCOPY TRANSURETHRAL RESECTION OF PROSTATE N/A 8/25/2023    Procedure: MEATOTOMY, cysotscopy, transurethral resection of prostate;  Surgeon: Debbie Rosa MD;  Location: [facility];  Service: Urology;  Laterality: N/A;    CYSTOSCOPY WITH CLOT EVACUATION N/A 8/26/2023    Procedure: CYSTOSCOPY WITH CLOT EVACUATION, bladder fulguration;  Surgeon: Debbie Rosa MD;  Location: [facility];  Service: Urology;  Laterality: N/A;    CYSTOSCOPY, RETROGRADE PYELOGRAM AND STENT INSERTION Left 8/26/2023    Procedure: CYSTOSCOPY RETROGRADE PYELOGRAM AND STENT INSERTION, left;  Surgeon: Debbie Rosa MD;  Location: [facility];  Service: Urology;  Laterality: Left;    SHOULDER SURGERY      RCR UNSURE OF SIDE      General Information       Row Name 08/30/23 1527          OT Time and Intention    Document Type therapy note (daily note)  -LF     Mode of Treatment individual therapy;occupational therapy  -LF               User Key  (r) = Recorded By, (t) =  Taken By, (c) = Cosigned By      Initials Name Provider Type    LF Eva Antunez OT Occupational Therapist                     Mobility/ADL's       Row Name 08/30/23 1527          Bed Mobility    Bed Mobility supine-sit  -     Supine-Sit [place] (Bed Mobility) supervision  -     Comment, (Bed Mobility) Patient completed supine to sit in preparation for BUE exercises on EOB.  -LF               User Key  (r) = Recorded By, (t) = Taken By, (c) = Cosigned By      Initials Name Provider Type    LF Eva Antunez OT Occupational Therapist                   Obj/Interventions       Row Name 08/30/23 1528          Shoulder (Therapeutic Exercise)    Shoulder (Therapeutic Exercise) strengthening exercise  -LF     Shoulder Strengthening (Therapeutic Exercise) bilateral;resistance band;green;2 sets;15 repititions  Shoulder flexion/extension, forward reach, and horizontal abduction/adduction  -LF       Row Name 08/30/23 1528          Elbow/Forearm (Therapeutic Exercise)    Elbow/Forearm (Therapeutic Exercise) strengthening exercise  -LF     Elbow/Forearm Strengthening (Therapeutic Exercise) bilateral;flexion;extension;resistance band;green;2 sets;15 repititions  -LF       Row Name 08/30/23 1528          Motor Skills    Motor Skills functional endurance  -LF     Functional Endurance Fair  -LF     Therapeutic Exercise shoulder;elbow/forearm  -LF       Row Name 08/30/23 1528          Balance    Balance Assessment sitting dynamic balance  -LF     Dynamic Sitting Balance supervision  -LF     Position, Sitting Balance unsupported;sitting edge of bed  -LF     Balance Interventions sitting;dynamic;occupation based/functional task;UE activity with balance activity  -LF               User Key  (r) = Recorded By, (t) = Taken By, (c) = Cosigned By      Initials Name Provider Type     Eva Antunez OT Occupational Therapist                   Goals/Plan    No documentation.                  Clinical Impression       Row  Name 08/30/23 1529          Pain Scale: FACES Pre/Post-Treatment    Pain: FACES Scale, Pretreatment 0-->no hurt  -LF     Posttreatment Pain Rating 0-->no hurt  -LF       Row Name 08/30/23 1529          Plan of Care Review    Plan of Care Reviewed With patient  -LF     Progress improving  -LF     Outcome Evaluation Patient completed BUE exercises on EOB, demonstrating fair endurance. He would benefit from continued OT services to maximize independence.  -LF       Row Name 08/30/23 1529          Vital Signs    O2 Delivery Pre Treatment room air  -LF     O2 Delivery Intra Treatment room air  -LF     O2 Delivery Post Treatment room air  -LF               User Key  (r) = Recorded By, (t) = Taken By, (c) = Cosigned By      Initials Name Provider Type     Eva Antunez, OT Occupational Therapist                   Outcome Measures       Row Name 08/30/23 1529          How much help from another is currently needed...    Putting on and taking off regular lower body clothing? 3  -LF     Bathing (including washing, rinsing, and drying) 3  -LF     Toileting (which includes using toilet bed pan or urinal) 3  -LF     Putting on and taking off regular upper body clothing 4  -LF     Taking care of personal grooming (such as brushing teeth) 4  -LF     Eating meals 4  -LF     AM-PAC 6 Clicks Score (OT) 21  -LF       Row Name 08/30/23 1226 08/30/23 0815       How much help from another person do you currently need...    Turning from your back to your side while in flat bed without using bedrails? 4  -DK 4  -MG    Moving from lying on back to sitting on the side of a flat bed without bedrails? 4  -DK 4  -MG    Moving to and from a bed to a chair (including a wheelchair)? 3  -DK 3  -MG    Standing up from a chair using your arms (e.g., wheelchair, bedside chair)? 4  -DK 3  -MG    Climbing 3-5 steps with a railing? 3  -DK 3  -MG    To walk in hospital room? 3  -DK 4  -MG    AM-PAC 6 Clicks Score (PT) 21  -DK 21  -MG    Highest level  of mobility 6 --> Walked 10 steps or more  -DK 6 --> Walked 10 steps or more  -MG      Row Name 08/30/23 1529 08/30/23 1226       Functional Assessment    Outcome Measure Options AM-PAC 6 Clicks Daily Activity (OT);Optimal Instrument  -LF AM-PAC 6 Clicks Basic Mobility (PT)  -DK      Row Name 08/30/23 1529          Optimal Instrument    Optimal Instrument Optimal - 3  -LF     Bending/Stooping 2  -LF     Standing 2  -LF     Reaching 1  -LF     From the list, choose the 3 activities you would most like to be able to do without any difficulty Bending/stooping;Standing;Reaching  -LF     Total Score Optimal - 3 5  -LF               User Key  (r) = Recorded By, (t) = Taken By, (c) = Cosigned By      Initials Name Provider Type    Ale Crawford, PTA Physical Therapist Assistant    LF Eva Antunez, OT Occupational Therapist    Lilibeth Serrano, RN Registered Nurse                    Occupational Therapy Education       Title: PT OT SLP Therapies (In Progress)       Topic: Occupational Therapy (In Progress)       Point: ADL training (In Progress)       Description:   Instruct learner(s) on proper safety adaptation and remediation techniques during self care or transfers.   Instruct in proper use of assistive devices.                  Learning Progress Summary             Patient Acceptance, E,TB, NR by  at 8/28/2023 1331                         Point: Precautions (In Progress)       Description:   Instruct learner(s) on prescribed precautions during self-care and functional transfers.                  Learning Progress Summary             Patient Acceptance, E,TB, NR by  at 8/28/2023 1331                         Point: Body mechanics (In Progress)       Description:   Instruct learner(s) on proper positioning and spine alignment during self-care, functional mobility activities and/or exercises.                  Learning Progress Summary             Patient Acceptance, E,TB, NR by  at 8/28/2023 1331                                          User Key       Initials Effective Dates Name Provider Type Discipline    LF 06/16/21 -  Eva Antunez OT Occupational Therapist OT                  OT Recommendation and Plan  Planned Therapy Interventions (OT): activity tolerance training, patient/caregiver education/training, BADL retraining, functional balance retraining, occupation/activity based interventions, strengthening exercise, transfer/mobility retraining  Therapy Frequency (OT): 5 times/wk  Plan of Care Review  Plan of Care Reviewed With: patient  Progress: improving  Outcome Evaluation: Patient completed BUE exercises on EOB, demonstrating fair endurance. He would benefit from continued OT services to maximize independence.     Time Calculation:   Evaluation Complexity (OT)  Review Occupational Profile/Medical/Therapy History Complexity: brief/low complexity  Assessment, Occupational Performance/Identification of Deficit Complexity: 3-5 performance deficits  Clinical Decision Making Complexity (OT): problem focused assessment/low complexity  Overall Complexity of Evaluation (OT): low complexity     Time Calculation- OT       Row Name 08/30/23 1530 08/30/23 1235          Time Calculation- OT    OT Received On 08/30/23  -LF --     OT Goal Re-Cert Due Date 09/06/23  -LF --        Timed Charges    84734 - OT Therapeutic Exercise Minutes 10  -LF --     85986 - Gait Training Minutes  -- 8  -DK     08826 - OT Therapeutic Activity Minutes 5  -LF --        Total Minutes    Timed Charges Total Minutes 15  -LF 8  -DK      Total Minutes 15  -LF 8  -DK               User Key  (r) = Recorded By, (t) = Taken By, (c) = Cosigned By      Initials Name Provider Type    Ale Crawford PTA Physical Therapist Assistant     Eva Antunez OT Occupational Therapist                  Therapy Charges for Today       Code Description Service Date Service Provider Modifiers Qty    [id number]  OT THER PROC EA 15 MIN 8/30/2023 Tluio  Eva, OT GO 1                 Eva Antunez, SHANNAN  8/30/2023

## 2023-08-30 NOTE — PLAN OF CARE
Goal Outcome Evaluation:           Progress: improving  Outcome Evaluation: patient received 1 unit of blood. great urine output; urine pale yellow in color; khan noted. will cont to monitor.    Patient complains its difficult to eat with hernia.

## 2023-08-31 ENCOUNTER — TELEPHONE (OUTPATIENT)
Dept: UROLOGY | Facility: CLINIC | Age: 83
End: 2023-08-31
Payer: MEDICARE

## 2023-08-31 LAB
BH BB BLOOD EXPIRATION DATE: NORMAL
BH BB BLOOD TYPE BARCODE: 5100
BH BB DISPENSE STATUS: NORMAL
BH BB PRODUCT CODE: NORMAL
BH BB UNIT NUMBER: NORMAL
CROSSMATCH INTERPRETATION: NORMAL
UNIT  ABO: NORMAL
UNIT  RH: NORMAL

## 2023-08-31 NOTE — TELEPHONE ENCOUNTER
PT IS SCHEDULED FOR A F/U ON 9/5 AT 9:15. HE IS ASKING IF THERE IS A LATER TIME OF DAY FOR HIM TO BE SEEN AS HE LIVES IN [place]. JUST CALL HIM AND LET HIM KNOW.

## 2023-09-01 ENCOUNTER — READMISSION MANAGEMENT (OUTPATIENT)
Dept: CALL CENTER | Facility: HOSPITAL | Age: 83
End: 2023-09-01
Payer: MEDICARE

## 2023-09-01 ENCOUNTER — TELEPHONE (OUTPATIENT)
Dept: UROLOGY | Facility: CLINIC | Age: 83
End: 2023-09-01
Payer: MEDICARE

## 2023-09-01 NOTE — TELEPHONE ENCOUNTER
Patient was on phone and was wanting to have a later apt in the day this nurse stated that there was no apt later that day due to dr casiano having a half day clinic that day.  No other apt are available at this time.  This nurse stated that he could wait for sept 14 2023 and have both catheter and the cysto on the same day.  Patient stated that he would keep the apt on 9/5/23 to get catheter out.  Patient was cursing and arguing about the apt time.  This nurse was sympathic but there is not an sooner apt time or another option that is available.

## 2023-09-01 NOTE — OUTREACH NOTE
AMI Week 1 Survey      Flowsheet Row Responses   [facility] facility patient discharged from? [facility]   Does the patient have one of the following disease processes/diagnoses(primary or secondary)? Acute MI (STEMI,NSTEMI)   Week 1 attempt successful? No   Unsuccessful attempts Attempt 1            Autumn DUMONT - Registered Nurse

## 2023-09-05 ENCOUNTER — READMISSION MANAGEMENT (OUTPATIENT)
Dept: CALL CENTER | Facility: HOSPITAL | Age: 83
End: 2023-09-05
Payer: MEDICARE

## 2023-09-05 ENCOUNTER — OFFICE VISIT (OUTPATIENT)
Dept: UROLOGY | Facility: CLINIC | Age: 83
End: 2023-09-05
Payer: MEDICARE

## 2023-09-05 VITALS
WEIGHT: 169 LBS | SYSTOLIC BLOOD PRESSURE: 145 MMHG | BODY MASS INDEX: 25.61 KG/M2 | HEIGHT: 68 IN | DIASTOLIC BLOOD PRESSURE: 65 MMHG | RESPIRATION RATE: 20 BRPM

## 2023-09-05 DIAGNOSIS — N40.1 BENIGN PROSTATIC HYPERPLASIA WITH WEAK URINARY STREAM: Primary | ICD-10-CM

## 2023-09-05 DIAGNOSIS — R39.12 BENIGN PROSTATIC HYPERPLASIA WITH WEAK URINARY STREAM: Primary | ICD-10-CM

## 2023-09-05 DIAGNOSIS — N35.919 STRICTURE OF MALE URETHRA, UNSPECIFIED STRICTURE TYPE: ICD-10-CM

## 2023-09-05 DIAGNOSIS — C67.2 MALIGNANT NEOPLASM OF LATERAL WALL OF URINARY BLADDER: ICD-10-CM

## 2023-09-05 RX ORDER — TAMSULOSIN HYDROCHLORIDE 0.4 MG/1
0.4 CAPSULE ORAL
COMMUNITY
Start: 2023-06-24

## 2023-09-05 RX ORDER — BUSPIRONE HYDROCHLORIDE 5 MG/1
5 TABLET ORAL
COMMUNITY
Start: 2023-06-24

## 2023-09-05 RX ORDER — SERTRALINE HYDROCHLORIDE 25 MG/1
TABLET, FILM COATED ORAL
COMMUNITY
Start: 2023-08-23

## 2023-09-05 NOTE — PROGRESS NOTES
Void trial- His bladder was empty and 360 ml of sterile h20 was instilled into his bladder patient was able to void after post void was started. Education was given.      Irrigation of Bladder    Date/Time: 9/5/2023 1:27 PM  Performed by: Dary Unger RN  Authorized by: Debbie Rosa MD   Preparation: Patient was prepped and draped in the usual sterile fashion.  Local anesthesia used: no    Anesthesia:  Local anesthesia used: no    Sedation:  Patient sedated: no    Comments: Post void residual was done 360 ml of sterile h20 was irrigated into the baldder.  Patient was able to void out approx 400ml.  No distress noted.

## 2023-09-05 NOTE — OUTREACH NOTE
AMI Week 1 Survey      Flowsheet Row Responses   [facility] facility patient discharged from? [facility]   Does the patient have one of the following disease processes/diagnoses(primary or secondary)? Acute MI (STEMI,NSTEMI)   Week 1 attempt successful? No   Unsuccessful attempts Attempt 2  [All numbers listed were attempted-no answer]            Rose H - Registered Nurse

## 2023-09-05 NOTE — PROGRESS NOTES
"    UROLOGY OFFICE follow-up NOTE    Subjective   HPI  Andrea Odom is a 83 y.o. male.  Presents for follow-up after meatotomy and TURP, 8/25/2023, with subsequent takeback for clot evacuation and fulguration 8/26/2023.  Bladder lesion noted adjacent to diverticula at time of TURP; unable to send separate specimen to pathology.  Postoperative course complicated by acute non-STEMI, cardiac cath indicated patent left stent.    The patient states that he is not feeling well at this time and feels that his blood glucose may be elevated. He would like to have his catheter removed today. His urine has been clear in color recently.    He has been experiencing some swelling in his bilateral feet. He is scheduled to see his primary care clinician tomorrow.    Son present at today's visit.  ___________  TURP pathology 8/25/2023: Prostate gland, transurethral resection:                - Atypical small acinar proliferation                - High grade papillary urothelial carcinoma, invasive into lamina propria                 - Muscularis propria present, without invasion               - Variant histology present:  Glandular differentiation     Cystoscopy, transurethral resection of prostate, meatotomy; 8/25/23    Cystoscopy, clot evacuation, bladder fulguration, left retrograde pyelogram, ureteral stent insertion; 8/26/23      Review of systems  A review of systems was performed, and positive findings are noted in the HPI.    Objective     Vital Signs:   /65   Resp 20   Ht 172.7 cm (67.99\")   Wt 76.7 kg (169 lb)   BMI 25.70 kg/m²       Physical exam  No acute distress, well-nourished  Awake alert and oriented  Mood normal; affect normal  [place] in place    Problem List:  Patient Active Problem List   Diagnosis    Acute chest pain    NSTEMI (non-ST elevated myocardial infarction)    ACS (acute coronary syndrome)    Anxiety    Aortic stenosis    Arteriosclerosis of coronary artery    B12 deficiency    Benign essential " HTN    Bilateral carotid artery stenosis    Carotid artery disease    Diabetes mellitus type 2 in nonobese    Dyslipidemia    Epigastric pain    GERD (gastroesophageal reflux disease)    Mixed hyperlipidemia    Paralysis of glottis    Presence of aortocoronary bypass graft    Status post carotid endarterectomy    Thyroid disease    Unstable angina    Vertigo    Voice hoarseness    Acute cystitis with hematuria    Hyponatremia    Near syncope    Weakness    Stricture of male urethra    Benign prostatic hyperplasia with urinary hesitancy    BPH (benign prostatic hyperplasia)    S/P TURP (status post transurethral resection of prostate)    S/P TURP    Gross hematuria    Hydronephrosis    Total occlusion of coronary artery, chronic    Type 2 myocardial infarction       Assessment & Plan   Diagnoses and all orders for this visit:    1. Benign prostatic hyperplasia with weak urinary stream (Primary)    2. Malignant neoplasm of lateral wall of urinary bladder    3. 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  Will warrant additional operative evaluation, reresection in 6 to 8 weeks per AUA guidelines.  Discussed possibility of recurrence as well as progression potentially resulting in morbidity and mortality should he fail to pursue continued management of this.  Patient and son noted understanding of this and are agreeable    Void trial performed today  Patient to notify office immediately or go to ER if unable to void    Plan to follow-up 2-3 weeks, PVR; left ureteral stent removal  All questions addressed          Transcribed from ambient dictation for Debbie Rosa MD by Sonia Harry.  09/05/23   10:05 EDT    Patient or patient  representative verbalized consent to the visit recording.  I have personally performed the services described in this document as transcribed by the above individual, and it is both accurate and complete.

## 2023-09-06 ENCOUNTER — PREP FOR SURGERY (OUTPATIENT)
Dept: OTHER | Facility: HOSPITAL | Age: 83
End: 2023-09-06
Payer: MEDICARE

## 2023-09-06 DIAGNOSIS — C67.2 MALIGNANT NEOPLASM OF LATERAL WALL OF URINARY BLADDER: Primary | ICD-10-CM

## 2023-09-06 RX ORDER — LEVOFLOXACIN 5 MG/ML
500 INJECTION, SOLUTION INTRAVENOUS ONCE
OUTPATIENT
Start: 2023-09-06 | End: 2023-09-06

## 2023-09-07 ENCOUNTER — TELEPHONE (OUTPATIENT)
Dept: UROLOGY | Facility: CLINIC | Age: 83
End: 2023-09-07
Payer: MEDICARE

## 2023-09-07 NOTE — TELEPHONE ENCOUNTER
PATIENT CALLED.  HE SAID HE HAS THE STENT AND HAS BEEN BLEEDING.      HE SAID WHEN HE WAS IN THE HOSPITAL FOR 3 OR 4 DAYS, HE HAD TO BE GIVEN A PINT OF BLOOD.    HE WANTS TO KNOW IF HE WILL HAVE TO GET ANOTHER PINT OF BLOOD, BETWEEN NOW AND 09/14/23.    CALL BACK -[phone].

## 2023-09-07 NOTE — TELEPHONE ENCOUNTER
Called 9/6 and today can not get ahold of patient voice mail not set up need to set up a cysto stent removal the week of 9/14 also to see how he is doing.

## 2023-09-07 NOTE — TELEPHONE ENCOUNTER
Called patient to advise that it looks like he is losing more blood than what he is. If he is dizzy , sweaty, bp low with a high pulse rate go to the ed.

## 2023-09-08 ENCOUNTER — HOSPITAL ENCOUNTER (EMERGENCY)
Facility: HOSPITAL | Age: 83
Discharge: HOME | End: 2023-09-08
Attending: EMERGENCY MEDICINE
Payer: MEDICARE

## 2023-09-08 ENCOUNTER — TELEPHONE (OUTPATIENT)
Dept: UROLOGY | Facility: CLINIC | Age: 83
End: 2023-09-08
Payer: MEDICARE

## 2023-09-08 VITALS
RESPIRATION RATE: 18 BRPM | HEIGHT: 68 IN | TEMPERATURE: 97.8 F | BODY MASS INDEX: 26.3 KG/M2 | SYSTOLIC BLOOD PRESSURE: 146 MMHG | OXYGEN SATURATION: 99 % | WEIGHT: 173.5 LBS | DIASTOLIC BLOOD PRESSURE: 64 MMHG | HEART RATE: 73 BPM

## 2023-09-08 DIAGNOSIS — R31.0 GROSS HEMATURIA: Primary | ICD-10-CM

## 2023-09-08 DIAGNOSIS — C67.9 MALIGNANT NEOPLASM OF URINARY BLADDER, UNSPECIFIED SITE: ICD-10-CM

## 2023-09-08 DIAGNOSIS — Z90.79 S/P TURP (STATUS POST TRANSURETHRAL RESECTION OF PROSTATE): ICD-10-CM

## 2023-09-08 LAB
ALBUMIN SERPL-MCNC: 4.3 G/DL (ref 3.5–5.2)
ALBUMIN/GLOB SERPL: 2.2 G/DL
ALP SERPL-CCNC: 87 U/L (ref 39–117)
ALT SERPL W P-5'-P-CCNC: 10 U/L (ref 1–41)
ANION GAP SERPL CALCULATED.3IONS-SCNC: 14.7 MMOL/L (ref 5–15)
AST SERPL-CCNC: 18 U/L (ref 1–40)
BACTERIA UR QL AUTO: ABNORMAL /HPF
BASOPHILS # BLD AUTO: 0.06 10*3/MM3 (ref 0–0.2)
BASOPHILS NFR BLD AUTO: 0.8 % (ref 0–1.5)
BILIRUB SERPL-MCNC: 0.4 MG/DL (ref 0–1.2)
BILIRUB UR QL STRIP: NEGATIVE
BUN SERPL-MCNC: 12 MG/DL (ref 8–23)
BUN/CREAT SERPL: 11.1 (ref 7–25)
CALCIUM SPEC-SCNC: 9.2 MG/DL (ref 8.6–10.5)
CHLORIDE SERPL-SCNC: 96 MMOL/L (ref 98–107)
CLARITY UR: ABNORMAL
CO2 SERPL-SCNC: 19.3 MMOL/L (ref 22–29)
COLOR UR: ABNORMAL
CREAT SERPL-MCNC: 1.08 MG/DL (ref 0.76–1.27)
D-LACTATE SERPL-SCNC: 1.3 MMOL/L (ref 0.5–2)
DEPRECATED RDW RBC AUTO: 45.8 FL (ref 37–54)
EGFRCR SERPLBLD CKD-EPI 2021: 68.1 ML/MIN/1.73
EOSINOPHIL # BLD AUTO: 0.08 10*3/MM3 (ref 0–0.4)
EOSINOPHIL NFR BLD AUTO: 1.1 % (ref 0.3–6.2)
ERYTHROCYTE [DISTWIDTH] IN BLOOD BY AUTOMATED COUNT: 13.4 % (ref 12.3–15.4)
GLOBULIN UR ELPH-MCNC: 2 GM/DL
GLUCOSE BLDC GLUCOMTR-MCNC: 120 MG/DL (ref 70–99)
GLUCOSE BLDC GLUCOMTR-MCNC: 132 MG/DL (ref 70–99)
GLUCOSE SERPL-MCNC: 144 MG/DL (ref 65–99)
GLUCOSE UR STRIP-MCNC: NEGATIVE MG/DL
HCT VFR BLD AUTO: 36.9 % (ref 37.5–51)
HGB BLD-MCNC: 11.6 G/DL (ref 13–17.7)
HGB UR QL STRIP.AUTO: ABNORMAL
HOLD SPECIMEN: NORMAL
HOLD SPECIMEN: NORMAL
HYALINE CASTS UR QL AUTO: ABNORMAL /LPF
IMM GRANULOCYTES # BLD AUTO: 0.04 10*3/MM3 (ref 0–0.05)
IMM GRANULOCYTES NFR BLD AUTO: 0.5 % (ref 0–0.5)
KETONES UR QL STRIP: NEGATIVE
LARGE PLATELETS: NORMAL
LEUKOCYTE ESTERASE UR QL STRIP.AUTO: ABNORMAL
LIPASE SERPL-CCNC: 39 U/L (ref 13–60)
LYMPHOCYTES # BLD AUTO: 1.26 10*3/MM3 (ref 0.7–3.1)
LYMPHOCYTES NFR BLD AUTO: 16.9 % (ref 19.6–45.3)
MCH RBC QN AUTO: 29.5 PG (ref 26.6–33)
MCHC RBC AUTO-ENTMCNC: 31.4 G/DL (ref 31.5–35.7)
MCV RBC AUTO: 93.9 FL (ref 79–97)
MONOCYTES # BLD AUTO: 0.32 10*3/MM3 (ref 0.1–0.9)
MONOCYTES NFR BLD AUTO: 4.3 % (ref 5–12)
NEUTROPHILS NFR BLD AUTO: 5.69 10*3/MM3 (ref 1.7–7)
NEUTROPHILS NFR BLD AUTO: 76.4 % (ref 42.7–76)
NITRITE UR QL STRIP: NEGATIVE
NRBC BLD AUTO-RTO: 0 /100 WBC (ref 0–0.2)
PH UR STRIP.AUTO: 6.5 [PH] (ref 5–8)
PLATELET # BLD AUTO: 228 10*3/MM3 (ref 140–450)
PMV BLD AUTO: 10.7 FL (ref 6–12)
POTASSIUM SERPL-SCNC: 4.4 MMOL/L (ref 3.5–5.2)
PROT SERPL-MCNC: 6.3 G/DL (ref 6–8.5)
PROT UR QL STRIP: ABNORMAL
RBC # BLD AUTO: 3.93 10*6/MM3 (ref 4.14–5.8)
RBC # UR STRIP: ABNORMAL /HPF
RBC MORPH BLD: NORMAL
REF LAB TEST METHOD: ABNORMAL
SMALL PLATELETS BLD QL SMEAR: ADEQUATE
SODIUM SERPL-SCNC: 130 MMOL/L (ref 136–145)
SP GR UR STRIP: 1.02 (ref 1–1.03)
SQUAMOUS #/AREA URNS HPF: ABNORMAL /HPF
UROBILINOGEN UR QL STRIP: ABNORMAL
WBC # UR STRIP: ABNORMAL /HPF
WBC MORPH BLD: NORMAL
WBC NRBC COR # BLD: 7.45 10*3/MM3 (ref 3.4–10.8)
WHOLE BLOOD HOLD SPECIMEN: NORMAL

## 2023-09-08 PROCEDURE — 85025 COMPLETE CBC W/AUTO DIFF WBC: CPT

## 2023-09-08 PROCEDURE — 81001 URINALYSIS AUTO W/SCOPE: CPT

## 2023-09-08 PROCEDURE — 80053 COMPREHEN METABOLIC PANEL: CPT

## 2023-09-08 PROCEDURE — 36415 COLL VENOUS BLD VENIPUNCTURE: CPT

## 2023-09-08 PROCEDURE — 83605 ASSAY OF LACTIC ACID: CPT

## 2023-09-08 PROCEDURE — 96374 THER/PROPH/DIAG INJ IV PUSH: CPT

## 2023-09-08 PROCEDURE — 82948 REAGENT STRIP/BLOOD GLUCOSE: CPT

## 2023-09-08 PROCEDURE — 83690 ASSAY OF LIPASE: CPT

## 2023-09-08 PROCEDURE — 25010000002 LORAZEPAM PER 2 MG: Performed by: EMERGENCY MEDICINE

## 2023-09-08 PROCEDURE — 99283 EMERGENCY DEPT VISIT LOW MDM: CPT

## 2023-09-08 PROCEDURE — 85007 BL SMEAR W/DIFF WBC COUNT: CPT

## 2023-09-08 RX ORDER — SODIUM CHLORIDE 0.9 % (FLUSH) 0.9 %
10 SYRINGE (ML) INJECTION AS NEEDED
Status: DISCONTINUED | OUTPATIENT
Start: 2023-09-08 | End: 2023-09-08 | Stop reason: HOSPADM

## 2023-09-08 RX ORDER — LORAZEPAM 2 MG/ML
0.5 INJECTION INTRAMUSCULAR ONCE
Status: COMPLETED | OUTPATIENT
Start: 2023-09-08 | End: 2023-09-08

## 2023-09-08 RX ADMIN — LORAZEPAM 0.5 MG: 2 INJECTION INTRAMUSCULAR; INTRAVENOUS at 16:46

## 2023-09-08 NOTE — TELEPHONE ENCOUNTER
Patient called and stated that he is urinating pure blood, he feels weak, dizzy he stated that his bs is fine and his bp is wnl. This nurse advise patient to go to the ed to be evaluated because of his extensive cardiac history, recent surgery, and him having to need blood while he was in the hospital the last time. This nurse stated that this would be the best course for him.

## 2023-09-08 NOTE — ED NOTES
Pt states that he has had blood in his urine since Tuesday after he had his catheter removed. Pt had  surgery to widen his urethra.

## 2023-09-09 NOTE — ED NOTES
PATIENT RESTING WITH THE LIGHTS DIMMED UPON ENTRY. FLETCHER RN AT BEDSIDE. PATIENT STATES HE DOES NOT NEED ANYTHING AT THIS MOMENT, EMM

## 2023-09-09 NOTE — DISCHARGE INSTRUCTIONS
Continue to follow your postop instructions.  Take your medications as previously directed.  Follow-up with Dr. Rosa as instructed for your postoperative visit.  Follow with your primary care provider for any further outpatient evaluation work-up.  Return to the ER for fever greater than 101, intractable vomiting, or any other concerns issues that may arise.

## 2023-09-09 NOTE — ED PROVIDER NOTES
"Time: 8:40 PM EDT  Date of encounter:  9/8/2023  Independent Historian/Clinical History and Information was obtained by:   Patient and Family  Chief Complaint: Hematuria    History is limited by: N/A    History of Present Illness:  Patient is a 83 y.o. year old male who presents to the emergency department for evaluation of hematuria.  Patient had TURP surgery approximately 2 weeks ago.  During that procedure was also found he had a bladder cancer.  That was treated.  Patient has a stent placed postsurgery.  Patient is currently on aspirin and Plavix.  Patient is began having some bleeding yesterday with urination.  Patient is concerned because during his surgery he had bleeding as well and had to have a transfusion.  Patient states that he just does not feel well is that he is felt weak and dizzy and was concerned and thus presents to the ER.  Patient denies any fevers or chills.    HPI    Patient Care Team  Primary Care Provider: Mary Moya APRN    Past Medical History:     Allergies   Allergen Reactions    Latex, Natural Rubber Rash    Pantoprazole Other (See Comments)     \"MADE ME FEEL BAD\"    Penicillins Dizziness           Sulfa Antibiotics Other (See Comments)     \"FEEL BAD\"     Past Medical History:   Diagnosis Date    Anxiety     Arthritis     BPH (benign prostatic hyperplasia)     Coronary artery disease     HAD STENTS PLACED. FOLLOWED BY DR HANSON. DECREASED ACTIVITY USES CANE    Diabetes mellitus     Frequent UTI     GERD (gastroesophageal reflux disease)     Hyperlipidemia     Hypertension     Old MI (myocardial infarction)     SAID THINKS ABOUT 4-5 YEARS AGO    Stricture of male urethra     Stroke     SAYS ABOUT 4-5 YEARS AGO NO RESIDUAL     Past Surgical History:   Procedure Laterality Date    CARDIAC CATHETERIZATION      stent placed    CARDIAC CATHETERIZATION N/A 08/11/2022    Procedure: Left Heart Cath;  Surgeon: Allison Lazcano MD;  Location: [facility] INVASIVE LOCATION;  " Service: Cardiovascular;  Laterality: N/A;    CARDIAC CATHETERIZATION N/A 08/11/2022    Procedure: Possible Percutaneous Coronary Intervention;  Surgeon: Allison Lazcano MD;  Location: [facility] CATH INVASIVE LOCATION;  Service: Cardiovascular;  Laterality: N/A;    CARDIAC CATHETERIZATION N/A 8/27/2023    Procedure: Left Heart Cath;  Surgeon: Saúl Berry MD;  Location: [facility] CATH INVASIVE LOCATION;  Service: Cardiology;  Laterality: N/A;    CARDIAC SURGERY      2 VESSEL CABG SAYS AROUND 35-40    CYSTOSCOPY TRANSURETHRAL RESECTION OF PROSTATE N/A 8/25/2023    Procedure: MEATOTOMY, cysotscopy, transurethral resection of prostate;  Surgeon: Debbie Rosa MD;  Location: [facility] OR;  Service: Urology;  Laterality: N/A;    CYSTOSCOPY WITH CLOT EVACUATION N/A 8/26/2023    Procedure: CYSTOSCOPY WITH CLOT EVACUATION, bladder fulguration;  Surgeon: Debbie Rosa MD;  Location: [facility] OR;  Service: Urology;  Laterality: N/A;    CYSTOSCOPY, RETROGRADE PYELOGRAM AND STENT INSERTION Left 8/26/2023    Procedure: CYSTOSCOPY RETROGRADE PYELOGRAM AND STENT INSERTION, left;  Surgeon: Debbie Rosa MD;  Location: [facility];  Service: Urology;  Laterality: Left;    SHOULDER SURGERY      RCR UNSURE OF SIDE     Family History   Problem Relation Age of Onset    Malig Hyperthermia Neg Hx        Home Medications:  Prior to Admission medications    Medication Sig Start Date End Date Taking? Authorizing Provider   amLODIPine (NORVASC) 5 MG tablet Take 1 tablet by mouth Daily.    ProviderZurdo MD   aspirin 81 MG chewable tablet Chew 1 tablet Daily. LAST DOSE TO BE 8/19/23 PER DR VELASQUEZ INSTRUCTIONS    Zurdo Holloway MD   bacitracin-polymyxin b (POLYSPORIN) 500-10327 UNIT/GM ointment Apply to tip of penis twice daily 8/30/23   Dane Malone MD   busPIRone (BUSPAR) 5 MG tablet 1 tablet. 6/24/23   Zurdo Holloway MD   clopidogrel (PLAVIX) 75 MG tablet Take 1 tablet by mouth Daily.  8/12/22   Joseph Le MD   cyanocobalamin (VITAMIN B-12) 500 MCG tablet Take 0.5 tablets by mouth 2 (Two) Times a Day. TAKES HALF A TABLET BID    Zurdo Holloway MD   glimepiride (AMARYL) 2 MG tablet Take 1 tablet by mouth Daily. 5/27/22   Zurdo Holloway MD   HYDROcodone-acetaminophen (Norco) 7.5-325 MG per tablet Take 1 tablet by mouth Every 6 (Six) Hours As Needed for Severe Pain. 8/30/23   Dane Malone MD   isosorbide mononitrate (IMDUR) 30 MG 24 hr tablet Take 1 tablet by mouth Daily for 30 days. 8/31/23 9/30/23  Dane Malone MD   levothyroxine (SYNTHROID, LEVOTHROID) 75 MCG tablet Take 1 tablet by mouth Before Breakfast. 4/23/23   Zurdo Holloway MD   lisinopril (PRINIVIL,ZESTRIL) 2.5 MG tablet Take 1 tablet by mouth Daily for 30 days. 8/30/23 9/29/23  Dane Malone MD   Magnesium Oxide -Mg Supplement 400 (240 Mg) MG tablet Take 1 tablet by mouth Daily. 7/6/23 7/6/24  Zurdo Holloway MD   magnesium oxide 250 MG tablet Take 1 tablet by mouth Daily. 7/6/23 7/7/24  Zurdo Holloway MD   metFORMIN (GLUCOPHAGE) 500 MG tablet Take 1 tablet by mouth 2 (Two) Times a Day With Meals. 2/24/23   Zurdo Holloway MD   metoprolol succinate XL (TOPROL-XL) 25 MG 24 hr tablet Take 1 tablet by mouth Daily for 30 days. 8/31/23 9/30/23  Dane Malone MD   nitroglycerin (NITROSTAT) 0.4 MG SL tablet Take 1 tablet by mouth Every 5 (Five) Minutes As Needed.    Zurdo Holloway MD   omeprazole (priLOSEC) 20 MG capsule Take 1 capsule by mouth Daily. 3/8/23   Zurdo Holloway MD   rosuvastatin (CRESTOR) 20 MG tablet Take 1 tablet by mouth Every Night. 1/6/23   Zurdo Holloway MD   sertraline (ZOLOFT) 25 MG tablet  8/23/23   Zurdo Holloway MD   tamsulosin (Flomax) 0.4 MG capsule 24 hr capsule 1 capsule. 6/24/23   Provider, Zurdo, MD        Social History:   Social History     Tobacco Use    Smoking status: Never     Passive exposure: Never    Smokeless  "tobacco: Never   Vaping Use    Vaping Use: Never used   Substance Use Topics    Alcohol use: Never    Drug use: Never         Review of Systems:  Review of Systems   Constitutional:  Negative for chills and fever.   HENT:  Negative for congestion, ear pain and sore throat.    Eyes:  Negative for pain.   Respiratory:  Negative for cough, chest tightness and shortness of breath.    Cardiovascular:  Negative for chest pain.   Gastrointestinal:  Negative for abdominal pain, diarrhea, nausea and vomiting.   Genitourinary:  Positive for hematuria. Negative for flank pain.   Musculoskeletal:  Negative for joint swelling.   Skin:  Negative for pallor.   Neurological:  Negative for seizures and headaches.   All other systems reviewed and are negative.     Physical Exam:  /64   Pulse 73   Temp 97.8 °F (36.6 °C) (Oral)   Resp 18   Ht 172.7 cm (68\")   Wt 78.7 kg (173 lb 8 oz)   SpO2 99%   BMI 26.38 kg/m²     Physical Exam      Vital signs were reviewed under triage note.  General appearance - Patient appears well-developed and well-nourished.  Patient is in no acute distress.  Head - Normocephalic, atraumatic.  Pupils - Equal, round, reactive to light.  Extraocular muscles are intact.  Conjunctiva is clear.  Nasal - Normal inspection.  No evidence of trauma or epistaxis.  Tympanic membranes - Gray, intact without erythema or retractions.  Oral mucosa - Pink and moist without lesions or erythema.  Uvula is midline.  Chest wall - Atraumatic.  Chest wall is nontender.  There are no vesicular rashes noted.  Neck - Supple.  Trachea was midline.  There is no palpable lymphadenopathy or thyromegaly.  There are no meningeal signs  Lungs - Clear to auscultation and percussion bilaterally.  Heart - Regular rate and rhythm without any murmurs, clicks, or gallops.  Abdomen - Soft.  Bowel sounds are present.  There is no palpable tenderness.  There is no rebound, guarding, or rigidity.  There are no palpable masses.  There are " no pulsatile masses.  Back - Spine is straight and midline.  There is no CVA tenderness.  Extremities - Intact x4 with full range of motion.  There is no palpable edema.  Pulses are intact x4 and equal.  Neurologic - Patient is awake, alert, and oriented x3.  Cranial nerves II through XII are grossly intact.  Motor and sensory functions grossly intact.  Cerebellar function was normal.  Integument - There are no rashes.  There are no petechia or purpura lesions noted.  There are no vesicular lesions noted.      Procedures:  Procedures      Medical Decision Making:      Comorbidities that affect care:    Diabetes, hypertension, GERD, coronary artery disease, hyperlipidemia, stroke, arthritis, BPH with recent TURP surgery, bladder cancer    External Notes reviewed:    Hospital Discharge Summary: Hospital discharge summary from 8/30/2023 was reviewed by me.      The following orders were placed and all results were independently analyzed by me:  Orders Placed This Encounter   Procedures    Comprehensive Metabolic Panel    Lipase    Urinalysis With Microscopic If Indicated (No Culture) - Urine, Clean Catch    Lactic Acid, Plasma    CBC Auto Differential    Urinalysis, Microscopic Only - Urine, Clean Catch    Scan Slide    Undress & Gown    POC Glucose STAT    POC Glucose Once    POC Glucose Once    CBC & Differential    Green Top (Gel)    Lavender Top    Gold Top - SST       Medications Given in the Emergency Department:  Medications   LORazepam (ATIVAN) injection 0.5 mg (0.5 mg Intravenous Given 9/8/23 1646)        ED Course:     The patient was seen and evaluated in the ED by me.  The above history and physical examination was performed as documented.  Diagnostic data was obtained.  Results reviewed.  Discussed with the patient.  The patient had a postvoid bladder scan which showed is little over 100 mL of urine in his bladder patient has voided multiple times in the ER without difficulty.  Patient does have  blood-tinged urine but no kandace or pure blood.  I did discuss case with Dr. Hairston who states that the findings are typical post surgery plus the addition that he is also on aspirin and Plavix because of his heart disease.  Patient's hemoglobin has increased since discharge.  Patient is thus stable for discharge home with continued outpatient follow-up.    Labs:    Lab Results (last 24 hours)       ** No results found for the last 24 hours. **             Imaging:    No Radiology Exams Resulted Within Past 24 Hours      Differential Diagnosis and Discussion:    Hematuria: Differential diagnosis includes but is not limited to medications, coagulopathy, glomerulonephritis, nephritis, neoplasm, vascular abnormalities, cystitis, urethritis, neoplasms of the bladder, and autoimmune disorders.    All labs were reviewed and interpreted by me.    MDM     Amount and/or Complexity of Data Reviewed  Decide to obtain previous medical records or to obtain history from someone other than the patient: yes             Patient Care Considerations:    CT ABDOMEN AND PELVIS: I considered ordering a CT scan of the abdomen and pelvis however the patient is voiding normally.  Bladder scan does not show any urinary retention.  Patient has no abdominal tenderness.      Consultants/Shared Management Plan:    I have discussed the case with Dr. Hairston who states that the patient can be safely discharged with close follow up.    Social Determinants of Health:    Patient has presented with family members who are responsible, reliable and will ensure follow up care.      Disposition and Care Coordination:    Discharged: I considered escalation of care by admitting this patient for observation, however the patient has improved and is suitable and  stable for discharge.    I have explained the patient´s condition, diagnoses and treatment plan based on the information available to me at this time. I have answered questions and addressed any  concerns. The patient has a good  understanding of the patient´s diagnosis, condition, and treatment plan as can be expected at this point. The vital signs have been stable. The patient´s condition is stable and appropriate for discharge from the emergency department.      The patient will pursue further outpatient evaluation with the primary care physician or other designated or consulting physician as outlined in the discharge instructions. They are agreeable to this plan of care and follow-up instructions have been explained in detail. The patient has received these instructions in written format and have expressed an understanding of the discharge instructions. The patient is aware that any significant change in condition or worsening of symptoms should prompt an immediate return to this or the closest emergency department or call to 911.    Final diagnoses:   Gross hematuria   S/P TURP (status post transurethral resection of prostate)   Malignant neoplasm of urinary bladder, unspecified site        ED Disposition       ED Disposition   Discharge    Condition   Stable    Comment   --               This medical record created using voice recognition software.             Tong Hutchinson DO  09/10/23 [id number]

## 2023-09-13 NOTE — PROGRESS NOTES
The patient left the office before care was provided and did not complete the visit for unknown reasons.

## 2023-09-14 ENCOUNTER — READMISSION MANAGEMENT (OUTPATIENT)
Dept: CALL CENTER | Facility: HOSPITAL | Age: 83
End: 2023-09-14
Payer: MEDICARE

## 2023-09-14 ENCOUNTER — PROCEDURE VISIT (OUTPATIENT)
Dept: UROLOGY | Facility: CLINIC | Age: 83
End: 2023-09-14
Payer: MEDICARE

## 2023-09-14 ENCOUNTER — PREP FOR SURGERY (OUTPATIENT)
Dept: OTHER | Facility: HOSPITAL | Age: 83
End: 2023-09-14
Payer: MEDICARE

## 2023-09-14 VITALS — HEIGHT: 68 IN | WEIGHT: 173 LBS | BODY MASS INDEX: 26.22 KG/M2

## 2023-09-14 DIAGNOSIS — N40.0 BENIGN PROSTATIC HYPERPLASIA, UNSPECIFIED WHETHER LOWER URINARY TRACT SYMPTOMS PRESENT: ICD-10-CM

## 2023-09-14 DIAGNOSIS — T19.9XXA FOREIGN BODY IN GENITOURINARY TRACT, INITIAL ENCOUNTER: ICD-10-CM

## 2023-09-14 DIAGNOSIS — N13.30 HYDRONEPHROSIS, UNSPECIFIED HYDRONEPHROSIS TYPE: Primary | ICD-10-CM

## 2023-09-14 RX ORDER — NITROFURANTOIN 25; 75 MG/1; MG/1
100 CAPSULE ORAL 2 TIMES DAILY
Qty: 6 CAPSULE | Refills: 0 | Status: SHIPPED | OUTPATIENT
Start: 2023-09-14

## 2023-09-14 NOTE — OUTREACH NOTE
AMI Week 3 Survey      Flowsheet Row Responses   [facility] patient discharged from? [facility]   Does the patient have one of the following disease processes/diagnoses(primary or secondary)? Acute MI (STEMI,NSTEMI)  [ER x 2]   Week 3 attempt successful? Yes   Call start time [phone]   Call end time 0956   Discharge diagnosis BPH (benign prostatic hyperplasia), NSTEMI (non-ST elevated myocardial infarction)   Person spoke with today (if not patient) and relationship son   Meds reviewed with patient/caregiver? Yes   Is the patient taking all medications as directed (includes completed medication regime)? Yes   Does the patient have a primary care provider?  Yes   Does the patient have an appointment with their PCP,cardiologist,or clinic within 7 days of discharge? Yes   Has the patient kept scheduled appointments due by today? Yes   Comments 9/14/23 appt with uro again   Has home health visited the patient within 72 hours of discharge? N/A   Psychosocial issues? No   Did the patient receive a copy of their discharge instructions? Yes   Nursing interventions Reviewed instructions with patient   What is the patient's perception of their health status since discharge? Improving   Nursing interventions Nurse provided patient education   Is the patient/caregiver able to teach back signs and symptoms of when to call for help immediately: Sudden chest discomfort, Sudden discomfort in arms, back, neck or jaw, Shortness of breath at any time, Sudden sweating or clammy skin, Nausea or vomiting, Dizziness or lightheadedness, Irregular or rapid heart rate   Nursing interventions Nurse provided patient education   Is the patient/caregiver able to teach back lifestyle changes to help prevent MIs Heart healthy diet, Reducing stress   Is the patient/caregiver able to teach back ways to prevent a second heart attack: Take medications, Follow up with MD   If the patient is a current smoker, are they able to teach back resources for  cessation? Not a smoker   Is the patient/caregiver able to teach back the hierarchy of who to call/visit for symptoms/problems? PCP, Specialist, Home health nurse, Urgent Care, ED, 911 Yes   Week 3 call completed? Yes   Graduated Yes   Graduated/Revoked comments Son reports Pt is doing well now. No needs. Has FU with urology again today   Call end time [id number]            MASON KEN - Registered Nurse

## 2023-09-14 NOTE — PROGRESS NOTES
Preoperative diagnosis  Foreign body in genitourinary tract; hydronephrosis; BPH    Postoperative diagnosis  Same as above    Procedure  Flexible cystourethroscopy with stent removal    Attending surgeon  Debbie Rosa MD     Anesthesia  2% lidocaine jelly intraurethrally    Complications  None    Specimen  None    Estimated blood loss  0cc    Indications  83 y.o. male who is status post TURP and left stent placement who presents for stent removal. No hematuria over the last several days.     Bladder Scan interpretation 09/14/2023    Estimation of residual urine via BVI 3000 [place] Bladder Scan  MA/nurse performing: Dary Unger RN  Residual Urine: 0 ml  Indication: Hydronephrosis, unspecified hydronephrosis type    Foreign body in genitourinary tract, initial encounter    Benign prostatic hyperplasia, unspecified whether lower urinary tract symptoms present   Position: Supine  Examination: Incremental scanning of the suprapubic area using 2.0 MHz transducer using copious amounts of acoustic gel.   Findings: An anechoic area was demonstrated which represented the bladder, with measurement of residual urine as noted. I inspected this myself. In that the residual urine was stable or insignificant, refer to plan for treatment and plan necessary at this time.       Procedure  The patient was appropriately identified and brought to the cytoscopy suite. A timeout was undertaken documenting the correct patient, site, and procedure. The patient was prepped in a normal sterile fashion. A flexible cytoscope was then introduced into the bladder. The stent was identified and grasped with endoscopic graspers. The entire stent was removed and passed off the field. Patient tolerated the procedure well. There were no intraprocedural complications.     Plan  Tolerated procedure well.  Instructions provided.  Emptying bladder well  Renal ultrasound 4-6 weeks prior to OR  Schedule OR as previously discussed, cysto TURBT  R/B/A  discussed  All question addressed

## 2023-09-18 ENCOUNTER — TELEPHONE (OUTPATIENT)
Dept: UROLOGY | Facility: CLINIC | Age: 83
End: 2023-09-18
Payer: MEDICARE

## 2023-09-18 NOTE — TELEPHONE ENCOUNTER
PATIENT CALLED AND SAID HE GOT A LETTER FROM [employer] THAT SAID DR. SINHA IS NO LONGER IN NETWORK.    HE SAID HE IS SUPPOSED TO BE SCHEDULED WITH DR. SINHA FOR SOMETHING.    PLEASE CALL HIM.

## 2023-09-29 ENCOUNTER — HOSPITAL ENCOUNTER (OUTPATIENT)
Dept: ULTRASOUND IMAGING | Facility: HOSPITAL | Age: 83
Discharge: HOME | End: 2023-09-29
Admitting: UROLOGY
Payer: MEDICARE

## 2023-09-29 DIAGNOSIS — N13.30 HYDRONEPHROSIS, UNSPECIFIED HYDRONEPHROSIS TYPE: ICD-10-CM

## 2023-09-29 PROCEDURE — 76775 US EXAM ABDO BACK WALL LIM: CPT

## 2023-10-03 ENCOUNTER — TELEPHONE (OUTPATIENT)
Dept: UROLOGY | Facility: CLINIC | Age: 83
End: 2023-10-03
Payer: MEDICARE

## 2023-10-03 NOTE — TELEPHONE ENCOUNTER
----- Message from Debbie Rosa MD sent at 10/2/2023  4:09 PM EDT -----  Please notify patient that I reviewed his kidney ultrasound.  Findings are reassuring, no evidence of hydronephrosis after stent removal.    I do not see any upcoming appointments; had plan to do an additional procedure given new diagnosis of bladder cancer.  He needs to continue urologic care; I understand there is an issue with his insurance and getting approval to continue to be seen here.    Please arrange follow-up (OR) if patient willing or able.  Thank you

## 2023-10-17 ENCOUNTER — TELEPHONE (OUTPATIENT)
Dept: UROLOGY | Facility: CLINIC | Age: 83
End: 2023-10-17
Payer: MEDICARE

## 2023-10-17 NOTE — TELEPHONE ENCOUNTER
Called patient to let him know he needs to contact his insurance company about what he needs to do to continue his care.

## 2024-03-11 ENCOUNTER — TELEPHONE (OUTPATIENT)
Dept: UROLOGY | Facility: CLINIC | Age: 84
End: 2024-03-11
Payer: MEDICARE

## 2024-03-11 NOTE — TELEPHONE ENCOUNTER
PT CALLED TO GET AN APPT. HE STATES THAT HE HAS NEW INSURANCE WITH [employer] AND NEEDS TO F/U TO SEE IF HIS CANCER HAS COME BACK. HE HAS BEEN HAVING SOME PAIN ON THE LEFT SIDE.

## 2024-04-11 ENCOUNTER — PREP FOR SURGERY (OUTPATIENT)
Dept: OTHER | Facility: HOSPITAL | Age: 84
End: 2024-04-11
Payer: MEDICARE

## 2024-04-11 ENCOUNTER — OFFICE VISIT (OUTPATIENT)
Dept: UROLOGY | Facility: CLINIC | Age: 84
End: 2024-04-11
Payer: MEDICARE

## 2024-04-11 VITALS
WEIGHT: 173.4 LBS | DIASTOLIC BLOOD PRESSURE: 62 MMHG | HEIGHT: 68 IN | BODY MASS INDEX: 26.28 KG/M2 | SYSTOLIC BLOOD PRESSURE: 142 MMHG | HEART RATE: 70 BPM

## 2024-04-11 DIAGNOSIS — Z01.818 PRE-OP TESTING: ICD-10-CM

## 2024-04-11 DIAGNOSIS — C67.9 MALIGNANT NEOPLASM OF URINARY BLADDER, UNSPECIFIED SITE: Primary | ICD-10-CM

## 2024-04-11 DIAGNOSIS — R30.0 DYSURIA: ICD-10-CM

## 2024-04-11 DIAGNOSIS — N40.0 BENIGN PROSTATIC HYPERPLASIA WITHOUT LOWER URINARY TRACT SYMPTOMS: ICD-10-CM

## 2024-04-11 LAB — URINE VOLUME: 0

## 2024-04-11 RX ORDER — CEFAZOLIN SODIUM 2 G/100ML
2000 INJECTION, SOLUTION INTRAVENOUS ONCE
OUTPATIENT
Start: 2024-04-11 | End: 2024-04-11

## 2024-04-11 NOTE — H&P (VIEW-ONLY)
UROLOGY OFFICE follow-up NOTE    Subjective   HPI  Andrea Odom is a 83 y.o. male. Presents for follow-up after meatotomy and TURP, 8/25/2023, with subsequent takeback for clot evacuation and fulguration 8/26/2023.  Bladder lesion noted adjacent to diverticula at time of TURP; unable to send separate specimen to pathology.  Postoperative course complicated by acute non-STEMI, cardiac cath indicated patent left stent.     The patient states that he is not feeling well at this time and feels that his blood glucose may be elevated. He would like to have his catheter removed today. His urine has been clear in color recently.    He has been experiencing some swelling in his bilateral feet. He is scheduled to see his primary care clinician tomorrow.     Son present at today's visit.    Update 4/11/2024: Patient presents for requested follow-up, BPH, bladder cancer.  Last seen 9/14/2023 at time of stent removal.  Plan had been for TURBT, patient had insurance issues never performed.  Renal ultrasound prior to today's visit.  The patient reports satisfactory urinary function post-TURP, pleased with the result.  Reports good strong stream.  Denies sensation of incomplete emptying.  Resolution of bothersome lower urinary tract symptoms.  Does however note some postvoid dribbling which he controls conservatively.    He recalls an incident 1 month ago where a clot was expelled.     ___________  Renal ultrasound 9/29/2023: No hydronephrosis; simple appearing left renal cyst moderate-sized diverticulum left side of bladder posteriorly; defect at the base of bladder compatible with history of TURP    TURP pathology 8/25/2023: Prostate gland, transurethral resection:                - Atypical small acinar proliferation                - High grade papillary urothelial carcinoma, invasive into lamina propria                 - Muscularis propria present, without invasion               - Variant histology present:  Glandular  "differentiation      Cystoscopy, transurethral resection of prostate, meatotomy; 8/25/23     Cystoscopy, clot evacuation, bladder fulguration, left retrograde pyelogram, ureteral stent insertion; 8/26/23      Results for orders placed or performed in visit on 04/11/24   Bladder Scan   Result Value Ref Range    Urine Volume 0        Review of systems  A review of systems was performed, and positive findings are noted in the HPI.    Objective     Vital Signs:   /62   Pulse 70   Ht 172.7 cm (68\")   Wt 78.7 kg (173 lb 6.4 oz)   BMI 26.37 kg/m²       Physical exam  No acute distress, well-nourished  Lungs: Clear, unlabored  CV: Regular rate, no chest retractions  Awake alert and oriented  Mood normal; affect normal    Results  Imaging  Ultrasound of the kidneys showed no abnormalities.    Bladder Scan interpretation 04/11/2024    Estimation of residual urine via BVI 3000 [place] Bladder Scan  Performed by: Dary Unger RN  Residual Urine: 0 ml  Indication: Malignant neoplasm of urinary bladder, unspecified site    Benign prostatic hyperplasia without lower urinary tract symptoms   Position: Supine  Examination: Incremental scanning of the suprapubic area using 2.0 MHz transducer using copious amounts of acoustic gel.   Findings: An anechoic area was demonstrated which represented the bladder, with measurement of residual urine as noted. I inspected this myself. In that the residual urine was stable or insignificant, refer to plan for treatment and plan necessary at this time.     Problem List:  Patient Active Problem List   Diagnosis    Acute chest pain    NSTEMI (non-ST elevated myocardial infarction)    ACS (acute coronary syndrome)    Anxiety    Aortic stenosis    Arteriosclerosis of coronary artery    B12 deficiency    Benign essential HTN    Bilateral carotid artery stenosis    Carotid artery disease    Diabetes mellitus type 2 in nonobese    Dyslipidemia    Epigastric pain    GERD (gastroesophageal reflux " disease)    Mixed hyperlipidemia    Paralysis of glottis    Presence of aortocoronary bypass graft    Status post carotid endarterectomy    Thyroid disease    Unstable angina    Vertigo    Voice hoarseness    Acute cystitis with hematuria    Hyponatremia    Near syncope    Weakness    Stricture of male urethra    Benign prostatic hyperplasia with urinary hesitancy    BPH (benign prostatic hyperplasia)    S/P TURP (status post transurethral resection of prostate)    S/P TURP    Gross hematuria    Hydronephrosis    Total occlusion of coronary artery, chronic    Type 2 myocardial infarction    Malignant neoplasm of urinary bladder       Assessment & Plan   Diagnoses and all orders for this visit:    1. Malignant neoplasm of urinary bladder, unspecified site (Primary)  -     Bladder Scan    2. Benign prostatic hyperplasia without lower urinary tract symptoms      Reports doing well after TURP; denies bothersome lower urinary tract symptoms    Renal ultrasound results reviewed with patient, no evidence of hydronephrosis after stent removal    High-grade T1 bladder cancer incidentally noted at time of TURP  Discussed the importance of continued management for this.  Aware of the risk of recurrence as well as progression of bladder cancer.    Plan to proceed to the OR for cystoscopy, TURBT.  Risk, benefits, and alternatives were discussed with patient at length.  Risks including but not limited to bleeding, infection, damage to surrounding structure, pain, cancer recurrence, benign pathology, need for catheter, need for admission, and risk of anesthesia including up to death.  Patient participate in the discussion, notes understanding, agreeable to proceed.     Schedule OR   all questions addressed    Transcribed from ambient dictation for Debbie Rosa MD by Tonya Marsh.  04/11/24   15:10 EDT    Patient or patient representative verbalized consent to the visit recording.  I have personally performed the services  described in this document as transcribed by the above individual, and it is both accurate and complete.

## 2024-04-22 ENCOUNTER — TELEPHONE (OUTPATIENT)
Dept: UROLOGY | Facility: CLINIC | Age: 84
End: 2024-04-22
Payer: MEDICARE

## 2024-04-22 NOTE — TELEPHONE ENCOUNTER
PT CALLED THINKING HE MISSED A CALL FROM OUR OFFICE. ADVISED HIM THAT WE DIDN'T CALL, BUT THE PRE-ADMIT AT HOSPITAL COULD HAVE CALLED HIM. PT SAID HE DOESN'T ANSWER NUMBERS HE DOESN'T KNOW. ADVISED HIM TO WATCH FOR THAT CALL. ALSO ADVISED THAT WE NEED HIM TO GET A HOLD OF DR MOREL'S OFFICE SO THAT WE CAN GET THEIR OK FOR HIM TO HOLD HIS ANTI-COAG. PT SAID HE WAS STOPPING IT WEDNESDAY, SAYS HE DOESN'T TAKE IT LIKE HE'S SUPPOSED TO BECAUSE IT MAKES HIM ITCH. TOLD HIM TO CALL DR MOREL AND CALL US BACK. PT EXPRESSED UNDERSTANDING AND IS AGREEABLE.

## 2024-04-26 ENCOUNTER — ANESTHESIA (OUTPATIENT)
Dept: PERIOP | Facility: HOSPITAL | Age: 84
End: 2024-04-26
Payer: MEDICARE

## 2024-04-26 ENCOUNTER — ANESTHESIA EVENT (OUTPATIENT)
Dept: PERIOP | Facility: HOSPITAL | Age: 84
End: 2024-04-26
Payer: MEDICARE

## 2024-04-26 ENCOUNTER — HOSPITAL ENCOUNTER (OUTPATIENT)
Facility: HOSPITAL | Age: 84
Setting detail: HOSPITAL OUTPATIENT SURGERY
Discharge: HOME | End: 2024-04-26
Attending: UROLOGY | Admitting: UROLOGY
Payer: MEDICARE

## 2024-04-26 VITALS
TEMPERATURE: 97.1 F | HEART RATE: 78 BPM | DIASTOLIC BLOOD PRESSURE: 59 MMHG | BODY MASS INDEX: 26.16 KG/M2 | HEIGHT: 68 IN | OXYGEN SATURATION: 99 % | RESPIRATION RATE: 20 BRPM | WEIGHT: 172.62 LBS | SYSTOLIC BLOOD PRESSURE: 131 MMHG

## 2024-04-26 DIAGNOSIS — C67.9 MALIGNANT NEOPLASM OF URINARY BLADDER, UNSPECIFIED SITE: ICD-10-CM

## 2024-04-26 DIAGNOSIS — C67.8 MALIGNANT NEOPLASM OF OVERLAPPING SITES OF BLADDER: Primary | ICD-10-CM

## 2024-04-26 LAB
GLUCOSE BLDC GLUCOMTR-MCNC: 155 MG/DL (ref 70–99)
GLUCOSE BLDC GLUCOMTR-MCNC: 159 MG/DL (ref 70–99)
QT INTERVAL: 392 MS
QTC INTERVAL: 463 MS

## 2024-04-26 PROCEDURE — 93005 ELECTROCARDIOGRAM TRACING: CPT | Performed by: ANESTHESIOLOGY

## 2024-04-26 PROCEDURE — 25010000002 ONDANSETRON PER 1 MG

## 2024-04-26 PROCEDURE — 88342 IMHCHEM/IMCYTCHM 1ST ANTB: CPT | Performed by: UROLOGY

## 2024-04-26 PROCEDURE — 88307 TISSUE EXAM BY PATHOLOGIST: CPT | Performed by: UROLOGY

## 2024-04-26 PROCEDURE — 25010000002 PROPOFOL 10 MG/ML EMULSION

## 2024-04-26 PROCEDURE — 25010000002 MIDAZOLAM PER 1MG: Performed by: ANESTHESIOLOGY

## 2024-04-26 PROCEDURE — 25010000002 CEFAZOLIN SODIUM 2 G RECONSTITUTED SOLUTION: Performed by: UROLOGY

## 2024-04-26 PROCEDURE — 93010 ELECTROCARDIOGRAM REPORT: CPT | Performed by: INTERNAL MEDICINE

## 2024-04-26 PROCEDURE — 25010000002 SUGAMMADEX 200 MG/2ML SOLUTION

## 2024-04-26 PROCEDURE — 88341 IMHCHEM/IMCYTCHM EA ADD ANTB: CPT | Performed by: UROLOGY

## 2024-04-26 PROCEDURE — 82948 REAGENT STRIP/BLOOD GLUCOSE: CPT

## 2024-04-26 PROCEDURE — 25010000002 FENTANYL CITRATE (PF) 50 MCG/ML SOLUTION

## 2024-04-26 PROCEDURE — 25010000002 DEXAMETHASONE PER 1 MG

## 2024-04-26 PROCEDURE — 25010000002 HYDROMORPHONE 1 MG/ML SOLUTION

## 2024-04-26 PROCEDURE — 25810000003 LACTATED RINGERS PER 1000 ML: Performed by: ANESTHESIOLOGY

## 2024-04-26 RX ORDER — ASPIRIN 81 MG/1
81 TABLET, CHEWABLE ORAL DAILY
Start: 2024-04-28

## 2024-04-26 RX ORDER — SODIUM CHLORIDE, SODIUM LACTATE, POTASSIUM CHLORIDE, CALCIUM CHLORIDE 600; 310; 30; 20 MG/100ML; MG/100ML; MG/100ML; MG/100ML
9 INJECTION, SOLUTION INTRAVENOUS CONTINUOUS PRN
Status: DISCONTINUED | OUTPATIENT
Start: 2024-04-26 | End: 2024-04-26 | Stop reason: HOSPADM

## 2024-04-26 RX ORDER — DEXAMETHASONE SODIUM PHOSPHATE 4 MG/ML
INJECTION, SOLUTION INTRA-ARTICULAR; INTRALESIONAL; INTRAMUSCULAR; INTRAVENOUS; SOFT TISSUE AS NEEDED
Status: DISCONTINUED | OUTPATIENT
Start: 2024-04-26 | End: 2024-04-26 | Stop reason: SURG

## 2024-04-26 RX ORDER — MAGNESIUM HYDROXIDE 1200 MG/15ML
LIQUID ORAL AS NEEDED
Status: DISCONTINUED | OUTPATIENT
Start: 2024-04-26 | End: 2024-04-26 | Stop reason: HOSPADM

## 2024-04-26 RX ORDER — FENTANYL CITRATE 50 UG/ML
INJECTION, SOLUTION INTRAMUSCULAR; INTRAVENOUS AS NEEDED
Status: DISCONTINUED | OUTPATIENT
Start: 2024-04-26 | End: 2024-04-26 | Stop reason: SURG

## 2024-04-26 RX ORDER — PHENAZOPYRIDINE HYDROCHLORIDE 200 MG/1
200 TABLET, FILM COATED ORAL 3 TIMES DAILY PRN
Qty: 20 TABLET | Refills: 0 | Status: SHIPPED | OUTPATIENT
Start: 2024-04-26

## 2024-04-26 RX ORDER — NITROGLYCERIN 0.4 MG/1
TABLET SUBLINGUAL
Status: COMPLETED
Start: 2024-04-26 | End: 2024-04-26

## 2024-04-26 RX ORDER — ONDANSETRON 2 MG/ML
4 INJECTION INTRAMUSCULAR; INTRAVENOUS ONCE AS NEEDED
Status: DISCONTINUED | OUTPATIENT
Start: 2024-04-26 | End: 2024-04-26 | Stop reason: HOSPADM

## 2024-04-26 RX ORDER — PROMETHAZINE HYDROCHLORIDE 25 MG/1
25 SUPPOSITORY RECTAL ONCE AS NEEDED
Status: DISCONTINUED | OUTPATIENT
Start: 2024-04-26 | End: 2024-04-26 | Stop reason: HOSPADM

## 2024-04-26 RX ORDER — EPHEDRINE SULFATE 50 MG/ML
INJECTION INTRAVENOUS AS NEEDED
Status: DISCONTINUED | OUTPATIENT
Start: 2024-04-26 | End: 2024-04-26 | Stop reason: SURG

## 2024-04-26 RX ORDER — PROMETHAZINE HYDROCHLORIDE 12.5 MG/1
25 TABLET ORAL ONCE AS NEEDED
Status: DISCONTINUED | OUTPATIENT
Start: 2024-04-26 | End: 2024-04-26 | Stop reason: HOSPADM

## 2024-04-26 RX ORDER — PHENYLEPHRINE HCL IN 0.9% NACL 1 MG/10 ML
SYRINGE (ML) INTRAVENOUS AS NEEDED
Status: DISCONTINUED | OUTPATIENT
Start: 2024-04-26 | End: 2024-04-26 | Stop reason: SURG

## 2024-04-26 RX ORDER — PROMETHAZINE HYDROCHLORIDE 12.5 MG/1
12.5 TABLET ORAL ONCE AS NEEDED
Status: DISCONTINUED | OUTPATIENT
Start: 2024-04-26 | End: 2024-04-26 | Stop reason: HOSPADM

## 2024-04-26 RX ORDER — ONDANSETRON 2 MG/ML
INJECTION INTRAMUSCULAR; INTRAVENOUS AS NEEDED
Status: DISCONTINUED | OUTPATIENT
Start: 2024-04-26 | End: 2024-04-26 | Stop reason: SURG

## 2024-04-26 RX ORDER — ACETAMINOPHEN 325 MG/1
650 TABLET ORAL ONCE
Status: DISCONTINUED | OUTPATIENT
Start: 2024-04-26 | End: 2024-04-26 | Stop reason: HOSPADM

## 2024-04-26 RX ORDER — PROPOFOL 10 MG/ML
VIAL (ML) INTRAVENOUS AS NEEDED
Status: DISCONTINUED | OUTPATIENT
Start: 2024-04-26 | End: 2024-04-26 | Stop reason: SURG

## 2024-04-26 RX ORDER — OXYCODONE HYDROCHLORIDE 5 MG/1
5 TABLET ORAL
Status: DISCONTINUED | OUTPATIENT
Start: 2024-04-26 | End: 2024-04-26 | Stop reason: HOSPADM

## 2024-04-26 RX ORDER — LIDOCAINE HYDROCHLORIDE 20 MG/ML
INJECTION, SOLUTION EPIDURAL; INFILTRATION; INTRACAUDAL; PERINEURAL AS NEEDED
Status: DISCONTINUED | OUTPATIENT
Start: 2024-04-26 | End: 2024-04-26 | Stop reason: SURG

## 2024-04-26 RX ORDER — MIDAZOLAM HYDROCHLORIDE 2 MG/2ML
2 INJECTION, SOLUTION INTRAMUSCULAR; INTRAVENOUS ONCE
Status: COMPLETED | OUTPATIENT
Start: 2024-04-26 | End: 2024-04-26

## 2024-04-26 RX ORDER — ACETAMINOPHEN 500 MG
1000 TABLET ORAL ONCE
Status: COMPLETED | OUTPATIENT
Start: 2024-04-26 | End: 2024-04-26

## 2024-04-26 RX ORDER — ONDANSETRON 4 MG/1
4 TABLET, ORALLY DISINTEGRATING ORAL ONCE AS NEEDED
Status: DISCONTINUED | OUTPATIENT
Start: 2024-04-26 | End: 2024-04-26 | Stop reason: HOSPADM

## 2024-04-26 RX ORDER — OXYCODONE HYDROCHLORIDE AND ACETAMINOPHEN 5; 325 MG/1; MG/1
1-2 TABLET ORAL EVERY 6 HOURS PRN
Qty: 14 TABLET | Refills: 0 | Status: SHIPPED | OUTPATIENT
Start: 2024-04-26

## 2024-04-26 RX ORDER — BUPIVACAINE HCL/0.9 % NACL/PF 0.1 %
2000 PLASTIC BAG, INJECTION (ML) EPIDURAL ONCE
Status: COMPLETED | OUTPATIENT
Start: 2024-04-26 | End: 2024-04-26

## 2024-04-26 RX ORDER — IBUPROFEN 600 MG/1
600 TABLET ORAL EVERY 6 HOURS PRN
Status: DISCONTINUED | OUTPATIENT
Start: 2024-04-26 | End: 2024-04-26 | Stop reason: HOSPADM

## 2024-04-26 RX ORDER — OXYBUTYNIN CHLORIDE 5 MG/1
5 TABLET ORAL 3 TIMES DAILY PRN
Qty: 12 TABLET | Refills: 0 | Status: SHIPPED | OUTPATIENT
Start: 2024-04-26

## 2024-04-26 RX ORDER — ROCURONIUM BROMIDE 10 MG/ML
INJECTION, SOLUTION INTRAVENOUS AS NEEDED
Status: DISCONTINUED | OUTPATIENT
Start: 2024-04-26 | End: 2024-04-26 | Stop reason: SURG

## 2024-04-26 RX ORDER — CLOPIDOGREL BISULFATE 75 MG/1
75 TABLET ORAL DAILY
Start: 2024-04-30

## 2024-04-26 RX ORDER — NITROGLYCERIN 0.4 MG/1
0.4 TABLET SUBLINGUAL ONCE
Status: COMPLETED | OUTPATIENT
Start: 2024-04-26 | End: 2024-04-26

## 2024-04-26 RX ORDER — PROBENECID 500 MG/1
250 TABLET, FILM COATED ORAL 2 TIMES DAILY
COMMUNITY
Start: 2024-04-15 | End: 2025-04-16

## 2024-04-26 RX ADMIN — HYDROMORPHONE HYDROCHLORIDE 0.25 MG: 1 INJECTION, SOLUTION INTRAMUSCULAR; INTRAVENOUS; SUBCUTANEOUS at 12:40

## 2024-04-26 RX ADMIN — ACETAMINOPHEN 1000 MG: 500 TABLET ORAL at 09:56

## 2024-04-26 RX ADMIN — SODIUM CHLORIDE 2000 MG: 900 INJECTION INTRAVENOUS at 10:25

## 2024-04-26 RX ADMIN — EPHEDRINE SULFATE 5 MG: 50 INJECTION INTRAVENOUS at 11:09

## 2024-04-26 RX ADMIN — EPHEDRINE SULFATE 5 MG: 50 INJECTION INTRAVENOUS at 10:37

## 2024-04-26 RX ADMIN — ROCURONIUM BROMIDE 10 MG: 10 INJECTION, SOLUTION INTRAVENOUS at 11:28

## 2024-04-26 RX ADMIN — HYDROMORPHONE HYDROCHLORIDE 0.25 MG: 1 INJECTION, SOLUTION INTRAMUSCULAR; INTRAVENOUS; SUBCUTANEOUS at 12:25

## 2024-04-26 RX ADMIN — ROCURONIUM BROMIDE 10 MG: 10 INJECTION, SOLUTION INTRAVENOUS at 11:16

## 2024-04-26 RX ADMIN — PROPOFOL 110 MG: 10 INJECTION, EMULSION INTRAVENOUS at 10:15

## 2024-04-26 RX ADMIN — SUGAMMADEX 200 MG: 100 INJECTION, SOLUTION INTRAVENOUS at 11:43

## 2024-04-26 RX ADMIN — NITROGLYCERIN 0.4 MG: 0.4 TABLET SUBLINGUAL at 12:25

## 2024-04-26 RX ADMIN — LIDOCAINE HYDROCHLORIDE 100 MG: 20 INJECTION, SOLUTION INTRAVENOUS at 10:15

## 2024-04-26 RX ADMIN — FENTANYL CITRATE 25 MCG: 50 INJECTION, SOLUTION INTRAMUSCULAR; INTRAVENOUS at 10:39

## 2024-04-26 RX ADMIN — EPHEDRINE SULFATE 5 MG: 50 INJECTION INTRAVENOUS at 10:46

## 2024-04-26 RX ADMIN — FENTANYL CITRATE 25 MCG: 50 INJECTION, SOLUTION INTRAMUSCULAR; INTRAVENOUS at 10:15

## 2024-04-26 RX ADMIN — EPHEDRINE SULFATE 5 MG: 50 INJECTION INTRAVENOUS at 10:29

## 2024-04-26 RX ADMIN — MIDAZOLAM HYDROCHLORIDE 2 MG: 1 INJECTION, SOLUTION INTRAMUSCULAR; INTRAVENOUS at 09:56

## 2024-04-26 RX ADMIN — SODIUM CHLORIDE, POTASSIUM CHLORIDE, SODIUM LACTATE AND CALCIUM CHLORIDE 9 ML/HR: 600; 310; 30; 20 INJECTION, SOLUTION INTRAVENOUS at 09:56

## 2024-04-26 RX ADMIN — EPHEDRINE SULFATE 5 MG: 50 INJECTION INTRAVENOUS at 10:56

## 2024-04-26 RX ADMIN — EPHEDRINE SULFATE 5 MG: 50 INJECTION INTRAVENOUS at 11:24

## 2024-04-26 RX ADMIN — ROCURONIUM BROMIDE 50 MG: 10 INJECTION, SOLUTION INTRAVENOUS at 10:15

## 2024-04-26 RX ADMIN — DEXAMETHASONE SODIUM PHOSPHATE 4 MG: 4 INJECTION, SOLUTION INTRAMUSCULAR; INTRAVENOUS at 10:27

## 2024-04-26 RX ADMIN — EPHEDRINE SULFATE 10 MG: 50 INJECTION INTRAVENOUS at 10:31

## 2024-04-26 RX ADMIN — ONDANSETRON HYDROCHLORIDE 4 MG: 2 SOLUTION INTRAMUSCULAR; INTRAVENOUS at 11:36

## 2024-04-26 RX ADMIN — Medication 100 MCG: at 10:24

## 2024-04-26 NOTE — ANESTHESIA PREPROCEDURE EVALUATION
Anesthesia Evaluation     Patient summary reviewed and Nursing notes reviewed   no history of anesthetic complications:   NPO Solid Status: > 8 hours  NPO Liquid Status: > 2 hours           Airway   Mallampati: II  TM distance: >3 FB  Neck ROM: full  No difficulty expected  Dental      Pulmonary - negative pulmonary ROS and normal exam    breath sounds clear to auscultation  Cardiovascular - normal exam  Exercise tolerance: good (4-7 METS)    ECG reviewed  Rhythm: regular  Rate: normal    (+) hypertension, valvular problems/murmurs, past MI , CAD, angina, hyperlipidemia      Neuro/Psych  (+) CVA, dizziness/light headedness, weakness, psychiatric history  GI/Hepatic/Renal/Endo    (+) GERD, renal disease-, diabetes mellitus type 2, thyroid problem     Musculoskeletal     Abdominal    Substance History - negative use     OB/GYN negative ob/gyn ROS         Other   arthritis,   history of cancer    ROS/Med Hx Other: PAT Nursing Notes unavailable.            ABNORMAL ECG -  Sinus rhythm  Probable inferior infarct, old  Nonspecific T abnormalities, lateral leads    Echo 8/28/23  Interpretation Summary         Left ventricular ejection fraction appears to be 51 - 55%.    Left ventricular diastolic function is consistent with (grade I) impaired relaxation.    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    Heart Cath 2023  Conclusions:  Right dominant coronary circulation  Severe native coronary disease  Occluded ostial LAD.  LAD fills via patent LIMA graft.  Occluded previous OM 2 stent  Patent previous left circumflex into OM1 stent.  Patent LIMA to LAD  SVG to the right coronary artery and native RCA are known to be chronically occluded and were not engaged on this study.  Highly elevated LVEDP, 30 mmHg.  No significant bioprosthetic aortic valve stenosis.     Recommendations:   Continue/optimize CAD medical therapy.  Optimize fluid status.   CAD risk factor modification.            Anesthesia Plan    ASA 4     general      (Patient understands anesthesia not responsible for dental damage.    Once procedure is finished pt asks for his bed to be raised up since he has neck pain and feels congested when lying flat)  intravenous induction     Anesthetic plan, risks, benefits, and alternatives have been provided, discussed and informed consent has been obtained with: patient.    Use of blood products discussed with patient .    Plan discussed with CRNA.        CODE STATUS:

## 2024-04-26 NOTE — NURSING NOTE
PATIENT WITH C/O CHEST PAIN. DENIES NAUSEA. VS STABLE. DR TEIXEIRA NOTIFIED. ORDERS RECEIVED FOR ECG AND NITRO SL. COMPLETED. PATIENT REPORTS IMPROVEMENT.

## 2024-04-26 NOTE — DISCHARGE INSTRUCTIONS
DISCHARGE INSTRUCTIONS   TRANSURETHRAL RESECTION   OF BLADDER TUMOR            For your surgery you had:  [x] General anesthesia (you may have a sore throat for the first 24 hours)    You may experience dizziness, drowsiness, or lightheadedness for several hours following surgery.  Do not stay alone today or tonight.  Limit your activity for 24 hours.   You should not drive, operate machinery, drink alcohol, or sign legally binding documents for 24 hours or while you are taking pain medication.   Resume your diet slowly. Follow any special dietary instructions you may have been given by your doctor.         NOTIFY YOUR DOCTOR IF YOU EXPERIENCE ANY OF THE FOLLOWING:  Temperature greater than 101 degrees Fahrenheit  Shaking chills  Nausea, vomiting, and/or pain that is not controlled by prescribed medications  Increase in bleeding or bleeding that is excessive  If unable to reach your doctor, please go to the closest Emergency Room   You will have a catheter to drain your bladder. Catheters are usually removed in 24-48 hours. Wash around the catheter with soap and water and rinse well.   Drink 6 glasses of water a day for 3-4 days.   You may see blood in your urine for up to a week, there may be small blood clots. If so, increase the amount you are drinking.   If you are passing large clots, call your doctor.   Avoid lifting or straining until released by MD.  You may have burning, pain, and frequency of urination for 48 hours after catheter is removed. Call your doctor if it continues longer.   Medications per physician as indicated on discharge medication information sheet.     [x] Last dose of pain medication given at:          SPECIAL INSTRUCTIONS:  REMOVE GRAFF CATH AS INSTRUCTED THE NIGHT BEFORE APPT WITH DR COLLINS ON 5/2.

## 2024-04-26 NOTE — ANESTHESIA POSTPROCEDURE EVALUATION
Patient advised  Done  Patient: Andrea Odom    Procedure Summary       Date: 04/26/24 Room / Location: [facility] OR 01 / [facility] MAIN OR    Anesthesia Start: 1007 Anesthesia Stop: 1155    Procedure: CYSTOSCOPY TRANSURETHRAL RESECTION OF BLADDER TUMOR (Bladder) Diagnosis:       Malignant neoplasm of urinary bladder, unspecified site      (Malignant neoplasm of urinary bladder, unspecified site [C67.9])    Surgeons: Debbie Rosa MD Provider: Eve Martin MD    Anesthesia Type: general ASA Status: 4            Anesthesia Type: general    Vitals  Vitals Value Taken Time   /62 04/26/24 1248   Temp 36.2 °C (97.2 °F) 04/26/24 1155   Pulse 83 04/26/24 1250   Resp 18 04/26/24 1245   SpO2 98 % 04/26/24 1250   Vitals shown include unfiled device data.        Post Anesthesia Care and Evaluation    Patient location during evaluation: bedside  Patient participation: complete - patient participated  Level of consciousness: awake  Pain management: adequate    Airway patency: patent  PONV Status: none  Cardiovascular status: acceptable and stable  Respiratory status: acceptable  Hydration status: acceptable    Comments: An Anesthesiologist personally participated in the most demanding procedures (including induction and emergence if applicable) in the anesthesia plan, monitored the course of anesthesia administration at frequent intervals and remained physically present and available for immediate diagnosis and treatment of emergencies.          Patient c/o angina with known hx of angina.  Obtained stat 12 lead EKG showing minimal change if any from prior EKGs.  Tried to get more better description of pain but patient could only say it hurts without being able to characterize it.  He then started complaining about abd pain and wanting to eat.  I asked him if he has had this before and he said he has and nitro made it better.  I ordered a 0.4 nitrostat and patient said his pain was better.  Vital signs were stable throughout.

## 2024-04-26 NOTE — OP NOTE
Preoperative diagnosis  Bladder cancer    Postoperative diagnosis  Bladder cancer    Procedure performed  Cystoscopy, transurethral resection of bladder tumor (medium 3-5 cm)    Attending surgeon  Debbie Rosa MD     Anesthesia  General    EBL  0 mL    Complications  None    Specimen  Posterior bladder wall tumors  Left bladder diverticular tumor    Findings  Patent prostatic urethra with TUR defect; numerous tiny posterior wall papillary tumors, up to 0.8 centimeter, approximately 15.  Large left bladder diverticulum with sizable papillary tumor, approximately 3 cm also with papillary carpeting at the base of the diverticulum  Resection of bladder tumors  20 [place] catheter    Indications  84 y.o. male agreed to undergo the above named procedure after discussion of the alternatives, risks and benefits.   Informed consent was obtained.      Procedure  After informed consent was obtained, the patient was taken back to the  operating suite where general anesthesia was administered.  Once the patient  was adequately anesthetized, he was placed in the dorsal lithotomy position.  His genitals were prepped and draped in the normal sterile fashion.  A rigid  cystoscope was inserted gently into the urethral meatus and passed along the  length of the urethra.  There were no strictures, stenosis, or bladder neck contracture; TURP defect noted, patent prostatic urethra.  Pan cystoscopy was performed in a 360-degree manner.  Several diverticula noted, prominent left diverticula with papillary tumor protruding from the anterior portion, and lateral walls of the diverticulum, approximately 3 cm of tumor burden.  Also noted to have multiple, approximately 15, small discrete papillary tumors varying in sizes at the posterior wall and dome, up to 0.8 mm.   Once pan cystoscopy was completed, the cystoscope was removed and a resectoscope was inserted.  The  bladder tumors were completely resected.  Muscle was noted to be in the  resection bed after the completion of resection.  Careful resection of the diverticulum was pursued.  This was very tedious and difficult.  There was papillary carpeted noted at the base of the diverticulum which was thoroughly fulgurated.  The anterior portion of the diverticular tumor was possibly incompletely resected because of the lip of the diverticular wall made access to this area very challenging.  No significant bleeding noted.  Hemostasis was performed throughout the resection with electrocautery. The  tumor was then irrigated out and passed for pathology.  The posterior bladder wall tumors and diverticular tumor sent as separate specimens. Final inspection of the resection beds of the tumor revealed adequate  hemostasis. Bladder tumor was passed off to pathology.  The scope was removed with the bladder remaining full and a 20-[place] Marroquin  catheter was inserted into the patient's bladder without difficulty and  secured with 10 mL in the balloon.  At this point the procedure was deemed  terminated.  The patient was placed back in the supine position, awoken from  anesthesia, and transferred to the PACU in good condition.    Sign:  Electronically signed by Debbie Rosa MD, 04/26/24, 7:46 PM EDT.        Signed:  Debbie Rosa MD  04/26/24  12:16 EDT

## 2024-04-29 ENCOUNTER — TELEPHONE (OUTPATIENT)
Dept: UROLOGY | Facility: CLINIC | Age: 84
End: 2024-04-29
Payer: MEDICARE

## 2024-04-29 NOTE — TELEPHONE ENCOUNTER
Called was not able to reach patient about the bleeding that is normal.  He is on plavix and that is going to make him bleed even better. Voice mail was full.

## 2024-04-29 NOTE — TELEPHONE ENCOUNTER
"Did speak with patient and he stated that he does have a lot of blood in his urine.  He stated that last time he had to get more blood. This nurse went over s/s of low blood to go to the ed.  Patient stated that the \"ed that is down here is crap\".  Patient then asked for the phone number to immediate care center this nurse stated that if his blood is low then they would send him to the hospital anyway.  Patient stated that he would call immediate care to see if they can do labs there.  Also he asked when his apt is scheduled this nurse stated on Thursday to 9am patient wanted a later apt time this nurse stated that here was not other apt available for Thursday but could look at next week for an apt. Patient stated that he did not want to take out his own catheter this nurse stated that she could take it out on his apt Thursday.    "

## 2024-04-29 NOTE — TELEPHONE ENCOUNTER
Cystoscopy, transurethral resection of bladder tumor (medium 3-5 cm)    PATIENT HAD SURGERY 04/26/24.  HE SAID HE IS BLEEDING MORE THAN NORMAL.  OVER THE WEEKEND HE BLED AND BLED  A LOT.  HE SAID THERE WAS A CLOT IN HIS CATHETER LINE.  HE SQUEEZED AND MASHED ON IT.  IT IS ON THE SIDE OF THE LINE.  URINE GOING IN TO BAG.    HE SAID HE HAD TO GET A PINT OF BLOOD THE LAST TIME HE HAD THE SURGERY.    HE ASKED FOR NURSE TO CALL HIM.

## 2024-05-01 ENCOUNTER — TELEPHONE (OUTPATIENT)
Dept: UROLOGY | Facility: CLINIC | Age: 84
End: 2024-05-01
Payer: MEDICARE

## 2024-05-01 NOTE — TELEPHONE ENCOUNTER
JUAN FROM PATHOLOGY CALLED AND SAID DR. YEUNG WOULD LIKE FOR DR. SINHA TO CALL HIM WHEN SHE GETS TO CLINIC TOMORROW, TO DISCUSS PATIENT'S CASE.  CALL [phone] OR HIS CELL, IF SHE HAS IT.

## 2024-05-02 ENCOUNTER — OFFICE VISIT (OUTPATIENT)
Dept: UROLOGY | Facility: CLINIC | Age: 84
End: 2024-05-02
Payer: MEDICARE

## 2024-05-02 VITALS — WEIGHT: 171.6 LBS | HEIGHT: 68 IN | BODY MASS INDEX: 26.01 KG/M2 | TEMPERATURE: 98.1 F

## 2024-05-02 DIAGNOSIS — C67.9 MALIGNANT NEOPLASM OF URINARY BLADDER, UNSPECIFIED SITE: Primary | ICD-10-CM

## 2024-05-02 DIAGNOSIS — N40.0 BENIGN PROSTATIC HYPERPLASIA WITHOUT LOWER URINARY TRACT SYMPTOMS: ICD-10-CM

## 2024-05-02 LAB
CYTO UR: NORMAL
LAB AP CASE REPORT: NORMAL
LAB AP CLINICAL INFORMATION: NORMAL
PATH REPORT.FINAL DX SPEC: NORMAL
PATH REPORT.GROSS SPEC: NORMAL

## 2024-05-02 NOTE — PROGRESS NOTES
UROLOGY OFFICE follow up NOTE    Subjective   HPI  Andrea Odom is a 84 y.o. male. Presents for follow-up after meatotomy and TURP, 8/25/2023, with subsequent takeback for clot evacuation and fulguration 8/26/2023.  Bladder lesion noted adjacent to diverticula at time of TURP; unable to send separate specimen to pathology.  Postoperative course complicated by acute non-STEMI, cardiac cath indicated patent left stent.     The patient states that he is not feeling well at this time and feels that his blood glucose may be elevated. He would like to have his catheter removed today. His urine has been clear in color recently.    He has been experiencing some swelling in his bilateral feet. He is scheduled to see his primary care clinician tomorrow.     Son present at today's visit.     Update 4/11/2024: Patient presents for requested follow-up, BPH, bladder cancer.  Last seen 9/14/2023 at time of stent removal.  Plan had been for TURBT, patient had insurance issues never performed.  Renal ultrasound prior to today's visit.  The patient reports satisfactory urinary function post-TURP, pleased with the result.  Reports good strong stream.  Denies sensation of incomplete emptying.  Resolution of bothersome lower urinary tract symptoms.  Does however note some postvoid dribbling which he controls conservatively.    He recalls an incident 1 month ago where a clot was expelled.     Update 5/2/24: Presents for follow up after TURBT, 4/26/24.   Catheter remains in place.  Patient notes significant bother the last 4-5 days with catheter.  No significant hematuria.    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  Large left  "bladder diverticulum with sizable papillary tumor, approximately 3 cm also with papillary carpeting at the base of the diverticulum  Resection of bladder tumors    TURBT Path 8/25/2023: HGT1; glandular differentiation noted and ASAP      Renal ultrasound 9/29/2023: No hydronephrosis; simple appearing left renal cyst moderate-sized diverticulum left side of bladder posteriorly; defect at the base of bladder compatible with history of TURP    Review of systems  A review of systems was performed, and positive findings are noted in the HPI.    Objective     Vital Signs:   Temp 98.1 °F (36.7 °C) (Temporal)   Ht 172.7 cm (67.99\")   Wt 77.8 kg (171 lb 9.6 oz)   BMI 26.10 kg/m²       Physical exam  No acute distress, well-nourished  Awake alert and oriented  Mood normal; affect normal    Problem List:  Patient Active Problem List   Diagnosis    Acute chest pain    NSTEMI (non-ST elevated myocardial infarction)    ACS (acute coronary syndrome)    Anxiety    Aortic stenosis    Arteriosclerosis of coronary artery    B12 deficiency    Benign essential HTN    Bilateral carotid artery stenosis    Carotid artery disease    Diabetes mellitus type 2 in nonobese    Dyslipidemia    Epigastric pain    GERD (gastroesophageal reflux disease)    Mixed hyperlipidemia    Paralysis of glottis    Presence of aortocoronary bypass graft    Status post carotid endarterectomy    Thyroid disease    Unstable angina    Vertigo    Voice hoarseness    Acute cystitis with hematuria    Hyponatremia    Near syncope    Weakness    Stricture of male urethra    Benign prostatic hyperplasia with urinary hesitancy    BPH (benign prostatic hyperplasia)    S/P TURP (status post transurethral resection of prostate)    S/P TURP    Gross hematuria    Hydronephrosis    Total occlusion of coronary artery, chronic    Type 2 myocardial infarction    Malignant neoplasm of urinary bladder       Assessment & Plan   Diagnoses and all orders for this visit:    1. " Malignant neoplasm of urinary bladder, unspecified site (Primary)    2. Benign prostatic hyperplasia without lower urinary tract symptoms      Marroquin catheter removed today; patient to notify with any issues    Preliminary pathology discussed with patient at length.  Discussed the significance of having tumor within the diverticulum of high-grade T1.  Discussed that this typically upstages the diagnosis secondary to the location within the diverticulum.  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  Aware of risk of failure to treat including progression and even death from bladder cancer.    Recommend referral to tertiary center, [facility] urologic oncologist, Dr. Isidro Smith, for discussion of management options.    Aware that his pathology is being sent to [facility] for second opinion, finalization of findings.    Patient uncertain if he would consider aggressive treatment or tertiary referral.  At conclusion of our discussion, agreeable to referral being placed.    Will notify patient once final pathology available and determine how he would like to proceed.    All questions addressed          Total time for encounter was 22 minutes including same day preparation prior to encounter (e.g. reviewing labs, prior notes), face to face time, counseling and coordination of care

## 2024-05-15 ENCOUNTER — TELEPHONE (OUTPATIENT)
Dept: UROLOGY | Facility: CLINIC | Age: 84
End: 2024-05-15
Payer: MEDICARE

## 2024-05-15 LAB
CYTO UR: NORMAL
LAB AP CASE REPORT: NORMAL
LAB AP CLINICAL INFORMATION: NORMAL
LAB AP SPECIAL STAINS: NORMAL
PATH REPORT.FINAL DX SPEC: NORMAL
PATH REPORT.GROSS SPEC: NORMAL

## 2024-05-15 NOTE — TELEPHONE ENCOUNTER
JUAN CALLED FROM PATHOLOGY.  BLADDER BIOPSY:  HIGH GRADE PAPILLARY UROTHELIAL CARCINOMA.  POSTERIOR URINARY BLADDER:  SMALL CELL CARCINOMA.

## 2024-05-21 ENCOUNTER — TELEPHONE (OUTPATIENT)
Dept: UROLOGY | Facility: CLINIC | Age: 84
End: 2024-05-21
Payer: MEDICARE

## 2024-05-21 ENCOUNTER — PREP FOR SURGERY (OUTPATIENT)
Dept: OTHER | Facility: HOSPITAL | Age: 84
End: 2024-05-21
Payer: MEDICARE

## 2024-05-21 DIAGNOSIS — C67.9 MALIGNANT NEOPLASM OF URINARY BLADDER, UNSPECIFIED SITE: Primary | ICD-10-CM

## 2024-05-21 DIAGNOSIS — C67.9 SMALL CELL CARCINOMA OF BLADDER: ICD-10-CM

## 2024-05-21 NOTE — TELEPHONE ENCOUNTER
Attempted to call and notify patient that I discussed his case with [facility] uro-oncologist, Dr. 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    Warrants staging workup via chest CT, abdomen, and pelvis with contrast.  I have put those orders in.    He believes return to OR to assess for residual tumor is appropriate, but if patient willing, will warrant heme-onc consultation.    I would like to see patient for follow-up office visit after staging workup (prior to OR) preferably with son to review things.    Let me know if you have questions.  Thank you so much

## 2024-05-21 NOTE — TELEPHONE ENCOUNTER
"I called and discussed final pathology report with patient after second opinion to [facility] pathology.  The pathologist on the report questions whether there is a possibility of \"tissue contaminant\" of a histologic variant called small cell carcinoma.  This type of bladder cancer would be treated differently than other types.  Patient was made aware of this and noted understanding.    Patient also stated that he would refuse chemotherapy noting that \"that stuff kills people.\"  Patient made aware that bladder cancer may also lead to death.    I have a call out to discuss his case with [facility] urology, Dr. Smith.     For now, I have recommended the patient return to the OR in 2-4 weeks for cystoscopy, transurethral resection of bladder tumor to obtain more tissue for pathology.    Patient is agreeable with this.    I told him I would update him should Dr. Smith have additional recommendations.    I put in surgery orders; please schedule.    Will need culture week prior.      Will let you know if there is a change in plans.  Thank you  Thank you  "

## 2024-05-24 NOTE — TELEPHONE ENCOUNTER
CALLED SON. ADVISED HIM OF DR COLLINS'S LAST MESSAGE REGARDING THE CONVERSATION BETWEEN HER AND DR TEIXEIRA. ADVISED SON THAT WE NEED TO GET SOME IMAGING DONE IN THE NEXT FEW WEEKS (SCHEDULING WILL CALL TO ARRANGE THIS), AND THEN WE NEED TO SEE THEM BOTH IN OFFICE FOR A DISCUSSION AFTER THE IMAGING IS DONE. ADVISED SON THAT BOTH DR'S THINK THAT HE NEEDS TO GO BACK TO THE OR FOR ANOTHER LOOK. SON SAID THAT HE WOULD DISCUSS THIS ALL WITH HIS DAD AND MAKE HIM AWARE THAT WE WILL BE TRYING TO GET HIM SCHED FOR IMAGING AND A F/U APPT. SON EXPRESSED UNDERSTANDING AND IS AGREEABLE.

## 2024-06-17 ENCOUNTER — TELEPHONE (OUTPATIENT)
Dept: UROLOGY | Facility: CLINIC | Age: 84
End: 2024-06-17
Payer: MEDICARE

## 2024-06-17 NOTE — TELEPHONE ENCOUNTER
Called Ranjan, phone kept ringing with no voicemail option. Calling in regards to referral and case request.

## 2024-06-25 ENCOUNTER — HOSPITAL ENCOUNTER (OUTPATIENT)
Dept: CT IMAGING | Facility: HOSPITAL | Age: 84
Discharge: HOME | End: 2024-06-25
Admitting: UROLOGY
Payer: MEDICARE

## 2024-06-25 DIAGNOSIS — C67.9 MALIGNANT NEOPLASM OF URINARY BLADDER, UNSPECIFIED SITE: ICD-10-CM

## 2024-06-25 DIAGNOSIS — C67.9 SMALL CELL CARCINOMA OF BLADDER: ICD-10-CM

## 2024-06-25 LAB
CREAT BLDA-MCNC: 1.2 MG/DL (ref 0.6–1.3)
EGFRCR SERPLBLD CKD-EPI 2021: 59.6 ML/MIN/1.73

## 2024-06-25 PROCEDURE — 82565 ASSAY OF CREATININE: CPT

## 2024-06-25 PROCEDURE — 25510000001 IOPAMIDOL PER 1 ML: Performed by: UROLOGY

## 2024-06-25 PROCEDURE — 71260 CT THORAX DX C+: CPT

## 2024-06-25 PROCEDURE — 74177 CT ABD & PELVIS W/CONTRAST: CPT

## 2024-06-25 RX ADMIN — IOPAMIDOL 100 ML: 755 INJECTION, SOLUTION INTRAVENOUS at 14:44

## 2024-07-11 ENCOUNTER — OFFICE VISIT (OUTPATIENT)
Dept: UROLOGY | Facility: CLINIC | Age: 84
End: 2024-07-11
Payer: MEDICARE

## 2024-07-11 VITALS
DIASTOLIC BLOOD PRESSURE: 54 MMHG | SYSTOLIC BLOOD PRESSURE: 126 MMHG | WEIGHT: 171 LBS | BODY MASS INDEX: 25.91 KG/M2 | HEIGHT: 68 IN

## 2024-07-11 DIAGNOSIS — C67.9 MALIGNANT NEOPLASM OF URINARY BLADDER, UNSPECIFIED SITE: Primary | ICD-10-CM

## 2024-07-11 DIAGNOSIS — N40.0 BENIGN PROSTATIC HYPERPLASIA WITHOUT LOWER URINARY TRACT SYMPTOMS: ICD-10-CM

## 2024-07-11 NOTE — PROGRESS NOTES
UROLOGY OFFICE FOLLOW UP NOTE    Subjective   HPI  Andrea Odom is a 84 y.o. male.  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  History of BPH with LUTS managed with TURP.  Last TURBT 4/26/2024.    History of Present Illness  Presents with son and ex-wife.    Dates he is voiding well.  The patient reports nocturia, necessitating 4 to 5 nocturnal awakenings. However, he is able to return to sleep uninterrupted.     He expresses a preference for radiation therapy over chemotherapy.  Still remains uncertain if he would be willing to pursue chemotherapy.      ___________  CT chest with contrast 6/25/2024:  No evidence of metastatic disease within the chest, abdomen or pelvis.  2. There are 2 low-density cystic lesions in the pancreatic tail and neck, measuring up to 1.4 cm, with associated dilatation of the pancreatic duct. Differential diagnosis includes cystic pancreatic neoplasm such as IPMN. This may be more definitively   characterized by MRI pancreatic mass protocol.  3. Diffuse wall thickening of the urinary bladder may be accentuated by nondistention. Cystitis not excluded. 2 small probable bladder diverticula are noted.       TURBT path 4/26/24: Second opinion pending; per [facility] pathologist, HGT1 colluding in the diverticulum; both specimens with small cell and neuroendocrine variant histology;    TURBT Path 8/25/2023: HGT1; glandular differentiation noted and ASAP     Operative findings 4/26/2024  Patent prostatic urethra with TUR defect; numerous tiny posterior wall papillary tumors, up to 0.8 centimeter, approximately 15.  Large left bladder diverticulum with sizable papillary tumor, approximately 3 cm also with papillary carpeting at the base of the diverticulum  Resection of bladder tumors       Renal ultrasound 9/29/2023: No hydronephrosis; simple appearing left renal cyst  "moderate-sized diverticulum left side of bladder posteriorly; defect at the base of bladder compatible with history of TURP    meatotomy and TURP, 8/25/2023: Postoperative course complicated by acute non-STEMI, cardiac cath indicated patent left stent.     Results for orders placed or performed during the hospital encounter of 06/25/24   POC Creatinine    Specimen: Blood   Result Value Ref Range    Creatinine 1.20 0.60 - 1.30 mg/dL    eGFR 59.6 (L) >60.0 mL/min/1.73         Review of systems  A review of systems was performed, and positive findings are noted in the HPI.    Objective     Vital Signs:   /54   Ht 172.7 cm (67.99\")   Wt 77.6 kg (171 lb)   BMI 26.01 kg/m²       Physical exam  No acute distress, well-nourished  Awake alert and oriented  Mood normal; affect normal  Physical Exam      Problem List:  Patient Active Problem List   Diagnosis    Acute chest pain    NSTEMI (non-ST elevated myocardial infarction)    ACS (acute coronary syndrome)    Anxiety    Aortic stenosis    Arteriosclerosis of coronary artery    B12 deficiency    Benign essential HTN    Bilateral carotid artery stenosis    Carotid artery disease    Diabetes mellitus type 2 in nonobese    Dyslipidemia    Epigastric pain    GERD (gastroesophageal reflux disease)    Mixed hyperlipidemia    Paralysis of glottis    Presence of aortocoronary bypass graft    Status post carotid endarterectomy    Thyroid disease    Unstable angina    Vertigo    Voice hoarseness    Acute cystitis with hematuria    Hyponatremia    Near syncope    Weakness    Stricture of male urethra    Benign prostatic hyperplasia with urinary hesitancy    BPH (benign prostatic hyperplasia)    S/P TURP (status post transurethral resection of prostate)    S/P TURP    Gross hematuria    Hydronephrosis    Total occlusion of coronary artery, chronic    Type 2 myocardial infarction    Malignant neoplasm of urinary bladder       Assessment & Plan   Diagnoses and all orders for this " visit:    1. Malignant neoplasm of urinary bladder, unspecified site (Primary)  -     Ambulatory Referral to Hematology / Oncology    2. Benign prostatic hyperplasia without lower urinary tract symptoms      Continues to void well; tolerable lower urinary tract symptoms  Assessment & Plan  Had lengthy discussion with patient and family at today's visit regarding his pathology, recommended treatment options.  Aware that his case was discussed with tertiary center who recommended pursuit of chemotherapy given squamous cell histologic variant of his bladder cancer.  Discussed the aggressiveness of his bladder cancer and risk of morbidity and even death of bladder cancer if untreated.    Staging workup negative for metastatic disease.  Discussed that this does not reveal micrometastasis for which chemotherapy is the recommended treatment particularly given the aggressiveness of squamous cell histologic variant of urothelial carcinoma.    Discussed additional concern regarding lack of localized control given the presence diverticular tumor in the bladder wall which has resulted in incomplete resections.      Again discussed case with Dr. Smith at the [facility] was conducted, emphasizing the standard of care for squamous cell differentiation of bladder cancer and radical cystectomy due to its potential risk of element of metastatic disease.      Given our discussion, patient still uncertain if he would be willing to pursue chemotherapy but agreeable for consultation to discuss.  A referral was made to Dr. Adams, an oncologist, to discuss chemotherapy options.     Patient expresses preference to continue attempts at localized control via cystoscopy TURBT.  Notes understand the risk, benefits, and alternatives of this procedure including bleeding, infection, damage to surrounding structure, pain, need for further procedures and management, incomplete resection, need for catheterization, and risk of  anesthesia including up to death.  Patient agreeable.  States he will call to schedule OR.       Schedule at patient's convenience   Med onc referral  all questions addressed      Patient or patient representative verbalized consent for the use of Ambient Listening during the visit with  Debbie Rosa MD for chart documentation. 7/12/2024  18:27 EDT

## 2024-07-18 ENCOUNTER — TELEPHONE (OUTPATIENT)
Dept: ONCOLOGY | Facility: HOSPITAL | Age: 84
End: 2024-07-18

## 2024-07-18 NOTE — TELEPHONE ENCOUNTER
Caller: Andrea Odom    Relationship to patient: Self    Best call back number: [phone]     Chief complaint: PT WOULD LIKE TO CANCEL HIS APPT FOR TOMORROW 07/19/24. PT STATES THAT SOMETHING HAS CAME UP AND HE WILL CALL BACK NEXT WEEK TO R/S    Type of visit: NEW PT ONCOLOGY

## 2024-09-04 ENCOUNTER — TELEPHONE (OUTPATIENT)
Dept: UROLOGY | Facility: CLINIC | Age: 84
End: 2024-09-04
Payer: MEDICARE

## 2024-09-04 ENCOUNTER — TELEPHONE (OUTPATIENT)
Dept: CARDIOLOGY | Facility: CLINIC | Age: 84
End: 2024-09-04
Payer: MEDICARE

## 2024-09-04 NOTE — TELEPHONE ENCOUNTER
----- Message from Denia Galarza sent at 9/4/2024 12:41 PM EDT -----    ----- Message -----  From: Manda Campos MA  Sent: 9/4/2024  11:51 AM EDT  To: KERRY Franklin

## 2024-09-04 NOTE — TELEPHONE ENCOUNTER
PATIENT CALLED AND ASKED FOR AN ASAP.  HE SAID HE WAS DIAGNOSED WITH BLADDER CANCER.  HE IS HAVING PAIN ACROSS HIS STOMACH WHERE HIS BLADDER IS LOCATED, AND HE SAID IT GOES UP TO HIS HEART SOMETIMES.  IT HAS HAPPENED 3 OR 4 TIMES THE PAST WEEK.  HE SAID WHEN HE EATS SOMETHING, IT WILL GO AWAY.    #[phone]

## 2024-09-04 NOTE — TELEPHONE ENCOUNTER
Spoke to pt, he states he would like to have his surgery on 9/30/24.   In person PAT 9/23/24 @ 12:30  Sent pt   Informed to hold aspirin and Plavix 3 days prior   Sent clearance letter   PT states understanding.

## 2024-09-04 NOTE — TELEPHONE ENCOUNTER
Patient has not been seen in our office. Dr Berry preformed LHC 8/27/23. Patient would need f/u in office with new provider prior to clearance.

## 2024-09-20 ENCOUNTER — TELEPHONE (OUTPATIENT)
Dept: UROLOGY | Facility: CLINIC | Age: 84
End: 2024-09-20
Payer: MEDICARE

## 2024-11-04 ENCOUNTER — TELEPHONE (OUTPATIENT)
Dept: UROLOGY | Age: 84
End: 2024-11-04

## 2024-11-05 ENCOUNTER — TELEPHONE (OUTPATIENT)
Dept: UROLOGY | Age: 84
End: 2024-11-05
Payer: MEDICARE

## 2024-11-05 NOTE — TELEPHONE ENCOUNTER
SPOKE TO PT WHO SAID HE WAS SUPPOSED TO HAVE A PAT APPT 11/4 AND HE WENT AND THEY TOLD HIM HE WASN'T SCHEDULED. I DON'T SEE AN APPT IN HIS CHART. CALLED PAT AND LM FOR A NURSE TO CALL ME BACK. ALL I SEE THAT'S NEEDED IN HIS CHART IS A UCX AND A BMP. IT LOOKS LIKE THE PT TALKED TO PAT ON 9/23, BUT NEVER HAD HIS PROCEDURE. WE HAVE ANTI-COAG/CARDS CLEARANCE SCANNED IN HIS CHART FROM 9/5/24. I FAXED THE 2 OUTSTANDING LABS TO [place] AND PT WILL HAVE THESE DONE TODAY. ADVISED PT THAT IF I HAVE ANY OTHER INFO FOR HIM, I WILL CALL HIM BACK. PT EXPRESSED UNDERSTANDING AND IS AGREEABLE.

## 2024-11-06 ENCOUNTER — TELEPHONE (OUTPATIENT)
Dept: UROLOGY | Age: 84
End: 2024-11-06
Payer: MEDICARE

## 2024-11-06 NOTE — TELEPHONE ENCOUNTER
ADVISED PT THAT I'VE FAXED 3 TIMES . I CALLED [facility] AND THEY GAVE ME A FAX NUMBER. I FAXED AGAIN. ADVISED PT TO GO TO THE LAB AGAIN, IF THEY DON'T HAVE THE ORDER, I ADVISED HIM TO HAVE THEM CALL ME WHILE HE'S STILL THERE. PT EXPRESSED UNDERSTANDING AND IS AGREEABLE.

## 2024-11-08 ENCOUNTER — ANESTHESIA EVENT (OUTPATIENT)
Dept: PERIOP | Facility: HOSPITAL | Age: 84
End: 2024-11-08
Payer: MEDICARE

## 2024-11-08 NOTE — PRE-PROCEDURE INSTRUCTIONS
IMPORTANT INSTRUCTIONS - PRE-ADMISSION TESTING  DO NOT EAT OR CHEW anything after midnight the night before your procedure.    You may have CLEAR liquids up to __2__ hours prior to ARRIVAL time.   Take the following medications the morning of your procedure with JUST A SIP OF WATER:  _LEVOTHRYOXINE (PATIENT UNABLE TO VERIFY MEDICATIONS )  ______________________________________________________________________________________________________________________________________________________________________________________    DO NOT BRING your medications to the hospital with you, UNLESS something has changed since your PRE-Admission Testing appointment.  Hold all vitamins, supplements, and NSAIDS (Non- steroidal anti-inflammatory meds) for one week prior to surgery (you MAY take Tylenol or Acetaminophen).  If you are diabetic, check your blood sugar the morning of your procedure. If it is less than 70 or if you are feeling symptomatic, call the following number for further instructions: [phone]_.  Use your inhalers/nebulizers as usual, the morning of your procedure. BRING YOUR INHALERS with you.   Bring your CPAP or BIPAP to hospital, ONLY IF YOU WILL BE SPENDING THE NIGHT.   Make sure you have a ride home and have someone who will stay with you the day of your procedure after you go home.  If you have any questions, please call your Pre-Admission Testing Nurse, IMTIAZ______ at [phone]__.   Per anesthesia request, do not smoke for 24 hours before your procedure or as instructed by your surgeon.

## 2024-11-11 ENCOUNTER — HOSPITAL ENCOUNTER (OUTPATIENT)
Facility: HOSPITAL | Age: 84
Setting detail: HOSPITAL OUTPATIENT SURGERY
Discharge: HOME | End: 2024-11-11
Attending: UROLOGY | Admitting: UROLOGY
Payer: MEDICARE

## 2024-11-11 ENCOUNTER — ANESTHESIA (OUTPATIENT)
Dept: PERIOP | Facility: HOSPITAL | Age: 84
End: 2024-11-11
Payer: MEDICARE

## 2024-11-11 VITALS
RESPIRATION RATE: 18 BRPM | SYSTOLIC BLOOD PRESSURE: 133 MMHG | OXYGEN SATURATION: 99 % | HEART RATE: 99 BPM | BODY MASS INDEX: 25.46 KG/M2 | TEMPERATURE: 98.4 F | HEIGHT: 68 IN | DIASTOLIC BLOOD PRESSURE: 58 MMHG | WEIGHT: 167.99 LBS

## 2024-11-11 DIAGNOSIS — C67.8 MALIGNANT NEOPLASM OF OVERLAPPING SITES OF BLADDER: ICD-10-CM

## 2024-11-11 DIAGNOSIS — C67.9 MALIGNANT NEOPLASM OF URINARY BLADDER, UNSPECIFIED SITE: ICD-10-CM

## 2024-11-11 LAB
ANION GAP SERPL CALCULATED.3IONS-SCNC: 11.6 MMOL/L (ref 5–15)
BUN SERPL-MCNC: 21 MG/DL (ref 8–23)
BUN/CREAT SERPL: 17.1 (ref 7–25)
CALCIUM SPEC-SCNC: 9.2 MG/DL (ref 8.6–10.5)
CHLORIDE SERPL-SCNC: 96 MMOL/L (ref 98–107)
CO2 SERPL-SCNC: 23.4 MMOL/L (ref 22–29)
CREAT SERPL-MCNC: 1.23 MG/DL (ref 0.76–1.27)
EGFRCR SERPLBLD CKD-EPI 2021: 57.9 ML/MIN/1.73
GLUCOSE BLDC GLUCOMTR-MCNC: 162 MG/DL (ref 70–99)
GLUCOSE BLDC GLUCOMTR-MCNC: 168 MG/DL (ref 70–99)
GLUCOSE BLDC GLUCOMTR-MCNC: 203 MG/DL (ref 70–99)
GLUCOSE SERPL-MCNC: 159 MG/DL (ref 65–99)
POTASSIUM SERPL-SCNC: 4.8 MMOL/L (ref 3.5–5.2)
QT INTERVAL: 423 MS
QT INTERVAL: 455 MS
QTC INTERVAL: 480 MS
QTC INTERVAL: 506 MS
SODIUM SERPL-SCNC: 131 MMOL/L (ref 136–145)

## 2024-11-11 PROCEDURE — 25010000002 HYDRALAZINE PER 20 MG: Performed by: ANESTHESIOLOGY

## 2024-11-11 PROCEDURE — 25010000002 DEXAMETHASONE PER 1 MG: Performed by: NURSE ANESTHETIST, CERTIFIED REGISTERED

## 2024-11-11 PROCEDURE — 25810000003 LACTATED RINGERS PER 1000 ML: Performed by: STUDENT IN AN ORGANIZED HEALTH CARE EDUCATION/TRAINING PROGRAM

## 2024-11-11 PROCEDURE — 25010000002 HYDROMORPHONE 1 MG/ML SOLUTION

## 2024-11-11 PROCEDURE — 25010000002 ONDANSETRON PER 1 MG

## 2024-11-11 PROCEDURE — 25010000002 FENTANYL CITRATE (PF) 50 MCG/ML SOLUTION: Performed by: NURSE ANESTHETIST, CERTIFIED REGISTERED

## 2024-11-11 PROCEDURE — 82948 REAGENT STRIP/BLOOD GLUCOSE: CPT | Performed by: ANESTHESIOLOGY

## 2024-11-11 PROCEDURE — 25010000002 CEFAZOLIN PER 500 MG: Performed by: UROLOGY

## 2024-11-11 PROCEDURE — 88307 TISSUE EXAM BY PATHOLOGIST: CPT | Performed by: UROLOGY

## 2024-11-11 PROCEDURE — 82948 REAGENT STRIP/BLOOD GLUCOSE: CPT

## 2024-11-11 PROCEDURE — S0260 H&P FOR SURGERY: HCPCS | Performed by: UROLOGY

## 2024-11-11 PROCEDURE — 93005 ELECTROCARDIOGRAM TRACING: CPT | Performed by: UROLOGY

## 2024-11-11 PROCEDURE — 25010000002 LIDOCAINE PF 2% 2 % SOLUTION: Performed by: NURSE ANESTHETIST, CERTIFIED REGISTERED

## 2024-11-11 PROCEDURE — 80048 BASIC METABOLIC PNL TOTAL CA: CPT | Performed by: ANESTHESIOLOGY

## 2024-11-11 PROCEDURE — 52224 CYSTOSCOPY AND TREATMENT: CPT | Performed by: UROLOGY

## 2024-11-11 PROCEDURE — 25010000002 MIDAZOLAM PER 1MG: Performed by: STUDENT IN AN ORGANIZED HEALTH CARE EDUCATION/TRAINING PROGRAM

## 2024-11-11 PROCEDURE — 93005 ELECTROCARDIOGRAM TRACING: CPT | Performed by: ANESTHESIOLOGY

## 2024-11-11 PROCEDURE — 25010000002 SUGAMMADEX 200 MG/2ML SOLUTION

## 2024-11-11 PROCEDURE — 25010000002 PROPOFOL 10 MG/ML EMULSION: Performed by: NURSE ANESTHETIST, CERTIFIED REGISTERED

## 2024-11-11 RX ORDER — PROMETHAZINE HYDROCHLORIDE 25 MG/1
25 SUPPOSITORY RECTAL ONCE AS NEEDED
Status: DISCONTINUED | OUTPATIENT
Start: 2024-11-11 | End: 2024-11-11 | Stop reason: HOSPADM

## 2024-11-11 RX ORDER — ONDANSETRON 2 MG/ML
4 INJECTION INTRAMUSCULAR; INTRAVENOUS ONCE AS NEEDED
Status: DISCONTINUED | OUTPATIENT
Start: 2024-11-11 | End: 2024-11-11 | Stop reason: HOSPADM

## 2024-11-11 RX ORDER — IBUPROFEN 600 MG/1
600 TABLET, FILM COATED ORAL EVERY 6 HOURS PRN
Status: DISCONTINUED | OUTPATIENT
Start: 2024-11-11 | End: 2024-11-11 | Stop reason: HOSPADM

## 2024-11-11 RX ORDER — FENTANYL CITRATE 50 UG/ML
INJECTION, SOLUTION INTRAMUSCULAR; INTRAVENOUS AS NEEDED
Status: DISCONTINUED | OUTPATIENT
Start: 2024-11-11 | End: 2024-11-11 | Stop reason: SURG

## 2024-11-11 RX ORDER — OXYCODONE HYDROCHLORIDE 5 MG/1
5 TABLET ORAL
Status: DISCONTINUED | OUTPATIENT
Start: 2024-11-11 | End: 2024-11-11 | Stop reason: HOSPADM

## 2024-11-11 RX ORDER — ONDANSETRON 2 MG/ML
INJECTION INTRAMUSCULAR; INTRAVENOUS AS NEEDED
Status: DISCONTINUED | OUTPATIENT
Start: 2024-11-11 | End: 2024-11-11 | Stop reason: SURG

## 2024-11-11 RX ORDER — PROMETHAZINE HYDROCHLORIDE 12.5 MG/1
25 TABLET ORAL ONCE AS NEEDED
Status: DISCONTINUED | OUTPATIENT
Start: 2024-11-11 | End: 2024-11-11 | Stop reason: HOSPADM

## 2024-11-11 RX ORDER — ROCURONIUM BROMIDE 10 MG/ML
INJECTION, SOLUTION INTRAVENOUS AS NEEDED
Status: DISCONTINUED | OUTPATIENT
Start: 2024-11-11 | End: 2024-11-11 | Stop reason: SURG

## 2024-11-11 RX ORDER — LIDOCAINE HYDROCHLORIDE 20 MG/ML
INJECTION, SOLUTION EPIDURAL; INFILTRATION; INTRACAUDAL; PERINEURAL AS NEEDED
Status: DISCONTINUED | OUTPATIENT
Start: 2024-11-11 | End: 2024-11-11 | Stop reason: SURG

## 2024-11-11 RX ORDER — HYDROCODONE BITARTRATE AND ACETAMINOPHEN 5; 325 MG/1; MG/1
1 TABLET ORAL EVERY 6 HOURS PRN
Qty: 10 TABLET | Refills: 0 | Status: SHIPPED | OUTPATIENT
Start: 2024-11-11

## 2024-11-11 RX ORDER — PHENAZOPYRIDINE HYDROCHLORIDE 200 MG/1
200 TABLET, FILM COATED ORAL 3 TIMES DAILY PRN
Qty: 10 TABLET | Refills: 0 | Status: SHIPPED | OUTPATIENT
Start: 2024-11-11

## 2024-11-11 RX ORDER — ONDANSETRON 4 MG/1
4 TABLET, ORALLY DISINTEGRATING ORAL ONCE AS NEEDED
Status: DISCONTINUED | OUTPATIENT
Start: 2024-11-11 | End: 2024-11-11 | Stop reason: HOSPADM

## 2024-11-11 RX ORDER — OXYBUTYNIN CHLORIDE 5 MG/1
5 TABLET ORAL 3 TIMES DAILY PRN
Qty: 10 TABLET | Refills: 0 | Status: SHIPPED | OUTPATIENT
Start: 2024-11-11

## 2024-11-11 RX ORDER — PROPOFOL 10 MG/ML
VIAL (ML) INTRAVENOUS AS NEEDED
Status: DISCONTINUED | OUTPATIENT
Start: 2024-11-11 | End: 2024-11-11 | Stop reason: SURG

## 2024-11-11 RX ORDER — ACETAMINOPHEN 500 MG
1000 TABLET ORAL ONCE
Status: COMPLETED | OUTPATIENT
Start: 2024-11-11 | End: 2024-11-11

## 2024-11-11 RX ORDER — PROMETHAZINE HYDROCHLORIDE 12.5 MG/1
12.5 TABLET ORAL ONCE AS NEEDED
Status: DISCONTINUED | OUTPATIENT
Start: 2024-11-11 | End: 2024-11-11 | Stop reason: HOSPADM

## 2024-11-11 RX ORDER — EPHEDRINE SULFATE 50 MG/ML
INJECTION INTRAVENOUS AS NEEDED
Status: DISCONTINUED | OUTPATIENT
Start: 2024-11-11 | End: 2024-11-11 | Stop reason: SURG

## 2024-11-11 RX ORDER — DEXAMETHASONE SODIUM PHOSPHATE 4 MG/ML
INJECTION, SOLUTION INTRA-ARTICULAR; INTRALESIONAL; INTRAMUSCULAR; INTRAVENOUS; SOFT TISSUE AS NEEDED
Status: DISCONTINUED | OUTPATIENT
Start: 2024-11-11 | End: 2024-11-11 | Stop reason: SURG

## 2024-11-11 RX ORDER — HYDRALAZINE HYDROCHLORIDE 20 MG/ML
10 INJECTION INTRAMUSCULAR; INTRAVENOUS ONCE
Status: COMPLETED | OUTPATIENT
Start: 2024-11-11 | End: 2024-11-11

## 2024-11-11 RX ORDER — MIDAZOLAM HYDROCHLORIDE 2 MG/2ML
2 INJECTION, SOLUTION INTRAMUSCULAR; INTRAVENOUS ONCE
Status: COMPLETED | OUTPATIENT
Start: 2024-11-11 | End: 2024-11-11

## 2024-11-11 RX ORDER — LISINOPRIL 10 MG/1
10 TABLET ORAL
Status: COMPLETED | OUTPATIENT
Start: 2024-11-11 | End: 2024-11-11

## 2024-11-11 RX ORDER — ASPIRIN 81 MG/1
81 TABLET, CHEWABLE ORAL DAILY
Start: 2024-11-13

## 2024-11-11 RX ORDER — ACETAMINOPHEN 325 MG/1
650 TABLET ORAL ONCE
Status: DISCONTINUED | OUTPATIENT
Start: 2024-11-11 | End: 2024-11-11 | Stop reason: HOSPADM

## 2024-11-11 RX ORDER — SODIUM CHLORIDE, SODIUM LACTATE, POTASSIUM CHLORIDE, CALCIUM CHLORIDE 600; 310; 30; 20 MG/100ML; MG/100ML; MG/100ML; MG/100ML
9 INJECTION, SOLUTION INTRAVENOUS CONTINUOUS PRN
Status: DISCONTINUED | OUTPATIENT
Start: 2024-11-11 | End: 2024-11-11 | Stop reason: HOSPADM

## 2024-11-11 RX ORDER — CLOPIDOGREL BISULFATE 75 MG/1
75 TABLET ORAL DAILY
Start: 2024-11-14

## 2024-11-11 RX ADMIN — SODIUM CHLORIDE 2000 MG: 9 INJECTION, SOLUTION INTRAVENOUS at 13:42

## 2024-11-11 RX ADMIN — ACETAMINOPHEN 1000 MG: 500 TABLET ORAL at 12:30

## 2024-11-11 RX ADMIN — FENTANYL CITRATE 50 MCG: 50 INJECTION, SOLUTION INTRAMUSCULAR; INTRAVENOUS at 14:12

## 2024-11-11 RX ADMIN — SUGAMMADEX 200 MG: 100 INJECTION, SOLUTION INTRAVENOUS at 14:22

## 2024-11-11 RX ADMIN — ROCURONIUM BROMIDE 50 MG: 10 INJECTION, SOLUTION INTRAVENOUS at 13:34

## 2024-11-11 RX ADMIN — FENTANYL CITRATE 50 MCG: 50 INJECTION, SOLUTION INTRAMUSCULAR; INTRAVENOUS at 13:34

## 2024-11-11 RX ADMIN — EPHEDRINE SULFATE 5 MG: 50 INJECTION INTRAVENOUS at 13:56

## 2024-11-11 RX ADMIN — EPHEDRINE SULFATE 10 MG: 50 INJECTION INTRAVENOUS at 13:47

## 2024-11-11 RX ADMIN — LISINOPRIL 10 MG: 10 TABLET ORAL at 15:25

## 2024-11-11 RX ADMIN — SODIUM CHLORIDE, POTASSIUM CHLORIDE, SODIUM LACTATE AND CALCIUM CHLORIDE 9 ML/HR: 600; 310; 30; 20 INJECTION, SOLUTION INTRAVENOUS at 12:30

## 2024-11-11 RX ADMIN — LIDOCAINE HYDROCHLORIDE 40 MG: 20 INJECTION, SOLUTION INTRAVENOUS at 13:34

## 2024-11-11 RX ADMIN — MIDAZOLAM HYDROCHLORIDE 2 MG: 1 INJECTION, SOLUTION INTRAMUSCULAR; INTRAVENOUS at 13:18

## 2024-11-11 RX ADMIN — DEXAMETHASONE SODIUM PHOSPHATE 4 MG: 4 INJECTION, SOLUTION INTRAMUSCULAR; INTRAVENOUS at 13:44

## 2024-11-11 RX ADMIN — HYDRALAZINE HYDROCHLORIDE 10 MG: 20 INJECTION, SOLUTION INTRAMUSCULAR; INTRAVENOUS at 15:14

## 2024-11-11 RX ADMIN — HYDROMORPHONE HYDROCHLORIDE 0.5 MG: 1 INJECTION, SOLUTION INTRAMUSCULAR; INTRAVENOUS; SUBCUTANEOUS at 14:59

## 2024-11-11 RX ADMIN — PROPOFOL 100 MG: 10 INJECTION, EMULSION INTRAVENOUS at 13:34

## 2024-11-11 RX ADMIN — ONDANSETRON HYDROCHLORIDE 4 MG: 2 SOLUTION INTRAMUSCULAR; INTRAVENOUS at 14:18

## 2024-11-11 NOTE — OP NOTE
Preoperative diagnosis  Bladder cancer    Postoperative diagnosis  Bladder cancer    Procedure performed  Cystoscopy, transurethral resection of bladder tumor (minor, 0.5 cm; extensive fulguration)    Attending surgeon  Debbie Rosa MD     Anesthesia  General    EBL  0 mL    Complications  None    Specimen  Posterior bladder wall tumor    Findings  Patent prostatic urethra with TUR defect; solitary papillary posterior wall tumor approximately 0.5 mm; some surrounding patchy erythema adjacent to prior resection site; lesion resected; area extensively fulgurated large left bladder diverticulum with papillary carpeting; extensively fulgurated     Indications  84 y.o. male agreed to undergo the above named procedure after discussion of the alternatives, risks and benefits.   Informed consent was obtained.      Procedure  After informed consent was obtained, the patient was taken back to the  operating suite where general anesthesia was administered.  Once the patient  was adequately anesthetized, he was placed in the dorsal lithotomy position.  His genitals were prepped and draped in the normal sterile fashion.  A rigid  cystoscope was inserted gently into the urethral meatus and passed along the  length of the urethra.  There were no strictures, stenosis, or bladder neck contracture; TURP defect noted, patent prostatic urethra.  Pan cystoscopy was performed in a 360-degree manner.  Several diverticula noted, prominent left diverticula noted.  Upon careful inspection of the diverticula, there was noted to be some papillary carpeting and discrete papillary lesion at anterior wall.  Also noted to have 1 discrete papillary tumor posterior wall approximately 0.5 mm.  Prior resection sites noted with some patchy erythema adjacent to it.  Once pan cystoscopy was completed, the cystoscope was removed and a resectoscope was inserted.  The  bladder tumor was were completely resected.  The surrounding area, patchy erythema,  papillary carpeting as well as a lesion within the diverticulum were then extensively fulgurated.  Total area of fulguration was approximately 3 cm.  No significant bleeding noted.  Hemostasis was performed throughout. The  Any piece of tissue was then irrigated out and passed for pathology.   Final inspection revealed adequate  hemostasis. The scope was removed after emptying the bladder.  At this point the procedure was deemed  terminated.  The patient was placed back in the supine position, awoken from  anesthesia, and transferred to the PACU in good condition.        Signed:  Debbie Rosa MD  11/11/24  14:38 EST

## 2024-11-11 NOTE — H&P
[facility]   Urology Preop H&P Note    Patient Name: Andrea Odom  : 1940  MRN: [id number]  Primary Care Physician:  Mary Moya APRN  Referring Physician: Debbie Rosa MD  Date of admission: 2024    Subjective   Subjective     Reason for Consult/ Chief Complaint: Malignant neoplasm of urinary bladder, unspecified site [C67.9]    HPI:  Andrea Odom is a 84 y.o. male history ofMalignant neoplasm of urinary bladder, unspecified site [C67.9] who presents for further management OR.  Presents for planned Procedure(s):  CYSTOSCOPY POSSIBLE TRANSURETHRAL RESECTION OF BLADDER TUMOR;  .    Risk, benefits, and alternatives discussed with patient prior to today.All questions were addressed after providing time for discussion.  Patient denies significant changes since last visit.  No new complaints today.    Patient adamant that he does not wish to have aggressive treatment performed.  Adamant that he does not want to go home with a catheter.  Wishes for essentially surveillance cystoscopy, watchful waiting of his bladder cancer.  Agrees to possible fulguration and resection of lesion depending on OR findings however, aware that this is a palliative procedure, not intended to cure, any treatment is for the potential of slowing the progression of his illness.  Also aware of risks of lack of treatment as previously discussed with extensive office visits including metastatic spread of bladder cancer and death from bladder cancer.  Notes understanding of the risks of the procedure including bleeding, pain, infection, damage to surrounding structures, need for further procedures or management, and risk of anesthesia including up to death.      ___________  CT chest with contrast 2024:  No evidence of metastatic disease within the chest, abdomen or pelvis.  2. There are 2 low-density cystic lesions in the pancreatic tail and neck, measuring up to 1.4 cm, with associated dilatation of the  pancreatic duct. Differential diagnosis includes cystic pancreatic neoplasm such as IPMN. This may be more definitively   characterized by MRI pancreatic mass protocol.  3. Diffuse wall thickening of the urinary bladder may be accentuated by nondistention. Cystitis not excluded. 2 small probable bladder diverticula are noted.        TURBT path 4/26/24: Second opinion pending; per [facility] pathologist, HGT1 colluding in the diverticulum; both specimens with small cell and neuroendocrine variant histology;     TURBT Path 8/25/2023: HGT1; glandular differentiation noted and ASAP     Operative findings 4/26/2024  Patent prostatic urethra with TUR defect; numerous tiny posterior wall papillary tumors, up to 0.8 centimeter, approximately 15.  Large left bladder diverticulum with sizable papillary tumor, approximately 3 cm also with papillary carpeting at the base of the diverticulum  Resection of bladder tumors        Renal ultrasound 9/29/2023: No hydronephrosis; simple appearing left renal cyst moderate-sized diverticulum left side of bladder posteriorly; defect at the base of bladder compatible with history of TURP     meatotomy and TURP, 8/25/2023: Postoperative course complicated by acute non-STEMI, cardiac cath indicated patent left stent.        Review of Systems   All systems were reviewed and negative except for the above  Personal History     Past Medical History:   Diagnosis Date    Anxiety     Arthritis     Bladder cancer     BPH (benign prostatic hyperplasia)     Coronary artery disease     HAD STENTS PLACED. FOLLOWED BY DR HANSON. DECREASED ACTIVITY USES CANE    Diabetes mellitus     Frequent UTI     GERD (gastroesophageal reflux disease)     Hyperlipidemia     Hypertension     NSTEMI (non-ST elevated myocardial infarction) 08/27/2023    Stricture of male urethra     Stroke     SAYS ABOUT 4-5 YEARS AGO NO RESIDUAL       Past Surgical History:   Procedure Laterality Date    AORTIC VALVE REPAIR/REPLACEMENT       CARDIAC CATHETERIZATION      stent placed    CARDIAC CATHETERIZATION N/A 08/11/2022    Procedure: Left Heart Cath;  Surgeon: Allison Lazcano MD;  Location: [facility] CATH INVASIVE LOCATION;  Service: Cardiovascular;  Laterality: N/A;    CARDIAC CATHETERIZATION N/A 08/11/2022    Procedure: Possible Percutaneous Coronary Intervention;  Surgeon: Allison Lazcano MD;  Location: [facility] CATH INVASIVE LOCATION;  Service: Cardiovascular;  Laterality: N/A;    CARDIAC CATHETERIZATION N/A 08/27/2023    Procedure: Left Heart Cath;  Surgeon: Saúl Berry MD;  Location: [facility] CATH INVASIVE LOCATION;  Service: Cardiology;  Laterality: N/A;    CARDIAC SURGERY      2 VESSEL CABG SAYS AROUND 35-40    CYSTOSCOPY TRANSURETHRAL RESECTION OF PROSTATE N/A 08/25/2023    Procedure: MEATOTOMY, cysotscopy, transurethral resection of prostate;  Surgeon: Debbie Rosa MD;  Location: [facility];  Service: Urology;  Laterality: N/A;    CYSTOSCOPY WITH CLOT EVACUATION N/A 08/26/2023    Procedure: CYSTOSCOPY WITH CLOT EVACUATION, bladder fulguration;  Surgeon: Debbie Rosa MD;  Location: [facility] OR;  Service: Urology;  Laterality: N/A;    CYSTOSCOPY, RETROGRADE PYELOGRAM AND STENT INSERTION Left 08/26/2023    Procedure: CYSTOSCOPY RETROGRADE PYELOGRAM AND STENT INSERTION, left;  Surgeon: Debbie Rosa MD;  Location: [facility] OR;  Service: Urology;  Laterality: Left;    SHOULDER SURGERY      RCR UNSURE OF SIDE    TRANSURETHRAL RESECTION OF BLADDER TUMOR N/A 04/26/2024    Procedure: CYSTOSCOPY TRANSURETHRAL RESECTION OF BLADDER TUMOR;  Surgeon: Debbie Rosa MD;  Location: [facility] OR;  Service: Urology;  Laterality: N/A;       Family History: family history is not on file. Otherwise pertinent FHx was reviewed and not pertinent to current issue.    Social History:  reports that he has never smoked. He has never been exposed to tobacco smoke. He has never used smokeless tobacco. He reports that he  "does not drink alcohol and does not use drugs.    Home Medications:  NON FORMULARY, amLODIPine, aspirin, busPIRone, clopidogrel, cyanocobalamin, hydrOXYzine, levothyroxine, lisinopril-hydrochlorothiazide, metFORMIN, nitroglycerin, omeprazole, probenecid, and rosuvastatin    Allergies:  Allergies   Allergen Reactions    Latex, Natural Rubber Rash    Pantoprazole Other (See Comments)     \"MADE ME FEEL BAD\"    Penicillins Dizziness           Sulfa Antibiotics Other (See Comments)     \"FEEL BAD\"       Objective    Objective     Vitals:   Temp:  [97.2 °F (36.2 °C)] 97.2 °F (36.2 °C)  Heart Rate:  [76] 76  Resp:  [18] 18  BP: (147)/(62) 147/62    Physical Exam:   Constitutional: Awake, alert   Respiratory: Clear, nonlabored respirations    Cardiovascular: Regular rate, no chest retractions   gastrointestinal: Appears soft, nontender     Results:    Assessment & Plan   Assessment / Plan     Brief Patient Summary:  Andrea Odom is a 84 y.o. male who     Active Hospital Problems:  Active Hospital Problems    Diagnosis     **Malignant neoplasm of urinary bladder        Plan:   Proceed to the OR for planned procedure, Procedure(s):  CYSTOSCOPY TRANSURETHRAL RESECTION OF BLADDER TUMOR,  ,   Risk, benefits, and alternatives discussed with patient at length he is agreeable to proceed  All questions addressed      Electronically signed by Debbie Rosa MD, 11/11/24, 1:16 PM EST.     "

## 2024-11-11 NOTE — ANESTHESIA PREPROCEDURE EVALUATION
Anesthesia Evaluation     Patient summary reviewed and Nursing notes reviewed   no history of anesthetic complications:   NPO Solid Status: > 8 hours  NPO Liquid Status: > 2 hours           Airway   Mallampati: II  TM distance: >3 FB  Neck ROM: full  No difficulty expected  Dental    (+) upper dentures and poor dentition        Pulmonary - negative pulmonary ROS and normal exam    breath sounds clear to auscultation  Cardiovascular - normal exam  Exercise tolerance: good (4-7 METS)    Rhythm: regular  Rate: normal    (+) hypertension well controlled, valvular problems/murmurs AS, past MI  >12 months, CAD, CABG (2v CABG over 40 years ago) >6 Months, cardiac stents (On Plavix and ASA, has held 5 days) more than 12 months ago , angina (occasional, appears diet related), hyperlipidemia,  carotid artery disease right carotid      Neuro/Psych  (+) CVA (In 2019), psychiatric history Anxiety  GI/Hepatic/Renal/Endo    (+) GERD well controlled, renal disease-, diabetes mellitus type 2, thyroid problem hypothyroidism    Musculoskeletal     Abdominal    Substance History - negative use     OB/GYN negative ob/gyn ROS         Other   arthritis,   history of cancer    ROS/Med Hx Other: >4METS,ACTIVE,BIKES,WALKS,LEG WORK OUTS. HX CAD,2VCABG 40+ YRS AGO,TAVR(PROSTHETIC),STENTS X2 4-5 YRS AGO.DM.NSTEMI S/P TURBT 8/23. CATH 8/23: MED MGMT. ECHO 8/30/23 EF 51-55%, NO VALVE STENOSIS. CARDS CLEARANCE/MED DIRECTIVE 9/5/24.                     Anesthesia Plan    ASA 3     general     (Patient understands anesthesia not responsible for dental damage. Needs to be sitting up on emergence/recovery from anesthesia. )  intravenous induction     Anesthetic plan, risks, benefits, and alternatives have been provided, discussed and informed consent has been obtained with: patient.    Use of blood products discussed with patient .    Plan discussed with CRNA.        CODE STATUS:

## 2024-11-11 NOTE — ANESTHESIA POSTPROCEDURE EVALUATION
Patient: Andrea Odom    Procedure Summary       Date: 11/11/24 Room / Location: [facility] OR 07 / [facility] MAIN OR    Anesthesia Start: 1334 Anesthesia Stop: 1433    Procedure: CYSTOSCOPY, Bladder fulguration, TURBT Diagnosis:       Malignant neoplasm of urinary bladder, unspecified site      (Malignant neoplasm of urinary bladder, unspecified site [C67.9])    Surgeons: Debbie Rosa MD Provider: Ranjan Berkowitz MD    Anesthesia Type: general ASA Status: 3            Anesthesia Type: general    Vitals  Vitals Value Taken Time   /63 11/11/24 1522   Temp 36.6 °C (97.9 °F) 11/11/24 1450   Pulse 85 11/11/24 1527   Resp 20 11/11/24 1520   SpO2 97 % 11/11/24 1527   Vitals shown include unfiled device data.        Post Anesthesia Care and Evaluation    Patient location during evaluation: bedside  Patient participation: complete - patient participated  Level of consciousness: awake  Pain score: 2  Pain management: adequate    Airway patency: patent  Anesthetic complications: No anesthetic complications  PONV Status: none  Cardiovascular status: acceptable and stable  Respiratory status: acceptable  Hydration status: acceptable    Comments: Pt w/ slight complaint of sore throat.  He had been inquiring about his blood pressure currently 160s systolic, states he needs his lisinopril.  I told him I would order him something iv then give him the po lisinopril.  Nl pressures run in the 140s.  Otherwise pt stable resting in bed asking questions about the location of his relatives.  Pt stable for discharge.

## 2024-11-11 NOTE — DISCHARGE INSTRUCTIONS
DISCHARGE INSTRUCTIONS   TRANSURETHRAL RESECTION   OF BLADDER TUMOR            For your surgery you had:  General anesthesia (you may have a sore throat for the first 24 hours)  You may experience dizziness, drowsiness, or lightheadedness for several hours following surgery.  Do not stay alone today or tonight.  Limit your activity for 24 hours.   You should not drive, operate machinery, drink alcohol, or sign legally binding documents for 24 hours or while you are taking pain medication.   Resume your diet slowly. Follow any special dietary instructions you may have been given by your doctor.         NOTIFY YOUR DOCTOR IF YOU EXPERIENCE ANY OF THE FOLLOWING:  Temperature greater than 101 degrees Fahrenheit  Shaking chills  Nausea, vomiting, and/or pain that is not controlled by prescribed medications  Increase in bleeding or bleeding that is excessive  If unable to reach your doctor, please go to the closest Emergency Room     Drink 6 glasses of water a day for 3-4 days.   You may see blood in your urine for up to a week, there may be small blood clots. If so, increase the amount you are drinking.   If you are passing large clots, call your doctor.   Avoid lifting or straining until released by MD.  You may have burning, pain, and frequency of urination for 48 hours. Call your doctor if it continues longer.   Medications per physician as indicated on discharge medication information sheet.      Last dose of pain medication given at:  Tylenol 1000 mg given at 12:30. Do not exceed 4000 mg kika 24 hour period.         SPECIAL INSTRUCTIONS:  Follow any verbal instructions from Dr. Rosa.

## 2024-11-11 NOTE — NURSING NOTE
"Patient complained of chest pressure. EKG obtained. Results called to Dr. Berman. Vital signs obtained and charted. Patient voiced a desire to be admitted. Dr. Rosa notified.  After resting patient states He \"feels better\" and is ready to go home.   "

## 2024-11-12 DIAGNOSIS — R82.71 BACTERIURIA: Primary | ICD-10-CM

## 2024-11-12 RX ORDER — CIPROFLOXACIN 500 MG/1
500 TABLET, FILM COATED ORAL 2 TIMES DAILY
Qty: 10 TABLET | Refills: 0 | Status: SHIPPED | OUTPATIENT
Start: 2024-11-12 | End: 2024-11-17

## 2024-11-13 ENCOUNTER — TELEPHONE (OUTPATIENT)
Dept: UROLOGY | Age: 84
End: 2024-11-13
Payer: MEDICARE

## 2024-11-13 NOTE — TELEPHONE ENCOUNTER
----- Message from Debbie Rosa sent at 11/12/2024  8:42 AM EST -----        ----- Message -----  From: Manda Campos MA  Sent: 11/12/2024   7:49 AM EST  To: Debbie Rosa MD      ----- Message -----  From: Lisandra Guo RegSched Rep  Sent: 11/11/2024   3:30 PM EST  To: Cecilia Hopson MA; Manda Campos MA    This Culture was received. [employer] would not allow me to attach it to the order. Please Advise.

## 2024-11-15 ENCOUNTER — TELEPHONE (OUTPATIENT)
Dept: UROLOGY | Age: 84
End: 2024-11-15
Payer: MEDICARE

## 2024-11-15 NOTE — TELEPHONE ENCOUNTER
As per my previous message, I sent in a course of antibiotic to the [facility] (send day of procedure).  He should have received this days ago.    Please ensure he is taking this.    Thank you

## 2024-11-15 NOTE — TELEPHONE ENCOUNTER
Spoke to pt, he states that he is symptomatic and the pyridium is helping some with his UTI pain.

## 2024-11-15 NOTE — TELEPHONE ENCOUNTER
Spoke to pt, he states that he is symptomatic. He mentioned that his pyridium is helping with his symptoms.

## 2024-11-29 LAB
QT INTERVAL: 423 MS
QT INTERVAL: 455 MS
QTC INTERVAL: 480 MS
QTC INTERVAL: 506 MS

## 2024-12-20 ENCOUNTER — TELEPHONE (OUTPATIENT)
Dept: UROLOGY | Age: 84
End: 2024-12-20
Payer: MEDICARE

## 2025-02-06 ENCOUNTER — PREP FOR SURGERY (OUTPATIENT)
Dept: OTHER | Facility: HOSPITAL | Age: 85
End: 2025-02-06
Payer: MEDICARE

## 2025-02-06 DIAGNOSIS — C67.2 MALIGNANT NEOPLASM OF LATERAL WALL OF URINARY BLADDER: Primary | ICD-10-CM

## 2025-02-25 DIAGNOSIS — R30.0 DYSURIA: ICD-10-CM

## 2025-02-25 DIAGNOSIS — C67.9 MALIGNANT NEOPLASM OF URINARY BLADDER, UNSPECIFIED SITE: ICD-10-CM

## 2025-02-25 DIAGNOSIS — Z01.818 PRE-OP TESTING: Primary | ICD-10-CM

## 2025-02-28 ENCOUNTER — TELEPHONE (OUTPATIENT)
Dept: UROLOGY | Age: 85
End: 2025-02-28
Payer: MEDICARE

## 2025-02-28 NOTE — TELEPHONE ENCOUNTER
SPOKE TO PT TO SCHED SURGERY. PT IS AGREEABLE TO 3/24, UCX ON 3/17 AT [facility] (MAILED TO PT & FAXED TO [facility]), HOLD ASA/PLAVIX 3 DAYS PRIOR (FAXED LETTER TO [employer]). MAILED ALL THIS INFO TO PT. ASKED FOR A CALL BACK IF ANY QUESTIONS. PT EXPRESSED UNDERSTANDING AND IS AGREEABLE.

## 2025-02-28 NOTE — TELEPHONE ENCOUNTER
----- Message from Debbie Rosa sent at 11/11/2024  2:42 PM EST -----  Regarding: 3 month OR, cystoscopy, possible TURBT  Will need OR in about 3 months for surveillance, possible treatment of bladder tumor    Will need urine culture week prior    Will need orders closer to time.  Thank you

## 2025-03-07 ENCOUNTER — TELEPHONE (OUTPATIENT)
Dept: UROLOGY | Age: 85
End: 2025-03-07
Payer: MEDICARE

## 2025-03-07 NOTE — TELEPHONE ENCOUNTER
CALLED PT TO MOVE HIS SURG FROM 3/24 TO 3/28. PT WILL HAVE A UCX DONE AT [facility] ON 3/21. MAILED NEW SURG INFO TO PT. PT EXPRESSED UNDERSTANDING AND IS AGREEABLE.

## 2025-03-27 RX ORDER — SERTRALINE HYDROCHLORIDE 25 MG/1
25 TABLET, FILM COATED ORAL DAILY
COMMUNITY
Start: 2024-12-28

## 2025-03-27 NOTE — PRE-PROCEDURE INSTRUCTIONS
PATIENT INSTRUCTED TO BE:    - NOTHING TO EAT AFTER MIDNIGHT OR CHEW, EXCEPT CAN HAVE SIPS OF WATER WITH MEDICATIONS   - TO HOLD ALL VITAMINS, SUPPLEMENTS, NSAIDS FOR ONE WEEK PRIOR TO THEIR SURGICAL PROCEDURE    - DO NOT TAKE __NA____________________ 7 DAYS PRIOR TO PROCEDURE PER ANESTHESIA RECOMMENDATIONS/INSTRUCTIONS     - INSTRUCTED PT TO USE SURGICAL SOAP 1 TIME THE NIGHT PRIOR TO SURGERY ____NA_______ OR THE AM OF SURGERY _____NA________   USE THE SOAP FROM NECK TO TOES, AVOID THEIR FACE, HAIR, AND PRIVATE PARTS. IF USE THE SOAP THE NIGHT PRIOR TO SURGERY, CHANGE BED LINENS AND NO PETS IN THE BED.     INSTRUCTED NO LOTIONS, JEWELRY, PIERCINGS,  NAIL POLISH, OR DEODORANT DAY OF SURGERY    - IF DIABETIC, CHECK BLOOD GLUCOSE IF LESS THAN 70 OR HAVING SYMPTOMS CALL THE PREOP AREA FOR INSTRUCTIONS ON AM OF SURGERY ([phone] )    -INSTRUCTED TO TAKE THE FOLLOWING MEDICATIONS THE DAY OF SURGERY WITH SIPS OF WATER:     BUSPAR, HYDROXYZINE, SYNTHROID, PRILOSEC, ZOLOFT     HOLD ASA AND PLAVIX PER PCP FOR 3 DAYS ( PT STATED STOPPED ON OWN LAST DOSE WAS 3-22-25 TAKEN)     TAKE METFORMIN BY 6PM THE NIGHT PRIOR TO SURGERY AND DO NOT TAKE DAY OF SURGERY       - DO NOT BRING ANY MEDICATIONS WITH YOU TO THE HOSPITAL THE DAY OF SURGERY, EXCEPT IF USE INHALERS. BRING INHALERS DAY OF SURGERY       - BRING CPAP OR BIPAP TO THE HOSPITAL ONLY IF YOU ARE SPENDING THE NIGHT    - DO NOT SMOKE OR VAPE 24 HOURS PRIOR TO PROCEDURE PER ANESTHESIA REQUEST     -MAKE SURE YOU HAVE A RIDE HOME OR SOMEONE TO STAY WITH YOU THE DAY OF THE PROCEDURE AFTER YOU GO HOME     - FOLLOW ANY OTHER INSTRUCTIONS GIVEN TO YOU BY YOUR SURGEON'S OFFICE.     - DAY OF SURGERY __[id number]__________,[facility] [employer] ( [address]), YOU CAN  PARK OR SELF PARK. ENTER THE PAVILION THRU MAIN ENTRANCE, TAKE ELEVATORS TO THE FIRST FLOOR, CHECK IN AT THE DESK FOR REGISTRATION/ SURGERY.    - YOU WILL RECEIVE A PHONE CALL THE DAY PRIOR TO  SURGERY BETWEEN 1PM AND 4 PM WITH ARRIVAL TIME, IF YOUR SURGERY IS ON A MONDAY YOU WILL RECEIVE A CALL THE FRIDAY PRIOR TO SURGERY DATE    - BRING CASH OR CREDIT CARD FOR COPAYMENT OF MEDICATIONS AFTER SURGERY IF YOU USE THE HOSPITAL PHARMACY (MEDS TO BED)    - PREADMISSION TESTING NURSE DANYEL KEN -[phone] IF HAVE ANY QUESTIONS     -PATIENT PROVIDED THE NUMBER FOR PREOP SURGICAL DEPT IF HAD QUESTIONS AFTER HOURS PRIOR TO SURGERY ([phone] ).  INFORMED PT IF NO ANSWER, LEAVE A MESSAGE AND SOMEONE WILL RETURN THEIR CALL       PATIENT VERBALIZED UNDERSTANDING

## 2025-04-18 ENCOUNTER — TELEPHONE (OUTPATIENT)
Dept: UROLOGY | Age: 85
End: 2025-04-18
Payer: MEDICARE

## 2025-04-18 DIAGNOSIS — Z01.818 PRE-OP TESTING: Primary | ICD-10-CM

## 2025-04-18 DIAGNOSIS — R30.0 DYSURIA: ICD-10-CM

## 2025-04-18 DIAGNOSIS — C67.9 MALIGNANT NEOPLASM OF URINARY BLADDER, UNSPECIFIED SITE: ICD-10-CM

## 2025-04-18 NOTE — TELEPHONE ENCOUNTER
Hub staff attempted to follow warm transfer process and was unsuccessful     Caller: Andrea Odom    Relationship to patient: Self    Best call back number:   Telephone Information:   Mobile [phone]     Patient is needing: PT CALLED TO R/S SURGERY WITH DR CAZARES.  PLEASE CALL PT TO R/S.  THANK YOU.

## 2025-04-18 NOTE — TELEPHONE ENCOUNTER
"ATTEMPTED TO CALL BACK, GOT A RECORDING THAT \"YOUR CALL CANNOT BE COMPLETED AT THIS TIME.\" SHOULD THE PT CALL BACK, PLEASE HAVE HIM CALL [phone], OPTION 2, THEN OPTION 4  "

## 2025-05-06 ENCOUNTER — TELEPHONE (OUTPATIENT)
Dept: UROLOGY | Age: 85
End: 2025-05-06
Payer: MEDICARE

## 2025-05-06 NOTE — TELEPHONE ENCOUNTER
Call made to pt to see if got culture; pt states he did yesterday and results should be coming tomorrow; no further questions

## 2025-05-08 NOTE — TELEPHONE ENCOUNTER
PATIENT CALLED AND HAS SURGERY IN THE MORNING.  HE WANTS TO KNOW IF HIS SURGERY CAN BE MOVED A FEW HOURS LATER.  HE HAS TO COME FROM  [place] AND HIS  DOESN'T GET UP THAT EARLY    I TOLD HIM THE HOSPITAL GIVES THE ARRIVAL TIMES, AND I DON'T KNOW  THAT IT CAN BE CHANGED.     HE ASKED FOR ME TO SEND MESSAGE TO THE NURSE.    #[phone]

## 2025-05-08 NOTE — TELEPHONE ENCOUNTER
Hub staff attempted to follow warm transfer process and was unsuccessful     Caller: Andrea Odom    Relationship to patient: Self    Best call back number: [phone] (home)      Patient is needing: PT CALLING AS HE GOT HIS APPT TIME FOR PROCEDURE ON 5/9/25 AND WANTS TO CHANGE THE TIME TO A FEW HOURS LATER. PT CALL PT BACK ASAP TO DISCUSS. THANK YOU.

## 2025-05-09 ENCOUNTER — ANESTHESIA EVENT (OUTPATIENT)
Dept: PERIOP | Facility: HOSPITAL | Age: 85
End: 2025-05-09
Payer: MEDICARE

## 2025-05-09 ENCOUNTER — ANESTHESIA (OUTPATIENT)
Dept: PERIOP | Facility: HOSPITAL | Age: 85
End: 2025-05-09
Payer: MEDICARE

## 2025-05-09 ENCOUNTER — HOSPITAL ENCOUNTER (OUTPATIENT)
Facility: HOSPITAL | Age: 85
Setting detail: HOSPITAL OUTPATIENT SURGERY
Discharge: HOME | End: 2025-05-09
Attending: UROLOGY | Admitting: UROLOGY
Payer: MEDICARE

## 2025-05-09 VITALS
DIASTOLIC BLOOD PRESSURE: 62 MMHG | BODY MASS INDEX: 24.69 KG/M2 | HEIGHT: 68 IN | WEIGHT: 162.92 LBS | TEMPERATURE: 97.2 F | HEART RATE: 85 BPM | RESPIRATION RATE: 18 BRPM | SYSTOLIC BLOOD PRESSURE: 122 MMHG | OXYGEN SATURATION: 96 %

## 2025-05-09 DIAGNOSIS — C67.2 MALIGNANT NEOPLASM OF LATERAL WALL OF URINARY BLADDER: ICD-10-CM

## 2025-05-09 DIAGNOSIS — C67.8 MALIGNANT NEOPLASM OF OVERLAPPING SITES OF BLADDER: ICD-10-CM

## 2025-05-09 LAB
ANION GAP SERPL CALCULATED.3IONS-SCNC: 10.7 MMOL/L (ref 5–15)
BUN SERPL-MCNC: 33 MG/DL (ref 8–23)
BUN/CREAT SERPL: 26.8 (ref 7–25)
CALCIUM SPEC-SCNC: 9.5 MG/DL (ref 8.6–10.5)
CHLORIDE SERPL-SCNC: 102 MMOL/L (ref 98–107)
CO2 SERPL-SCNC: 23.3 MMOL/L (ref 22–29)
CREAT SERPL-MCNC: 1.23 MG/DL (ref 0.76–1.27)
EGFRCR SERPLBLD CKD-EPI 2021: 57.5 ML/MIN/1.73
GLUCOSE BLDC GLUCOMTR-MCNC: 132 MG/DL (ref 70–99)
GLUCOSE BLDC GLUCOMTR-MCNC: 135 MG/DL (ref 70–99)
GLUCOSE SERPL-MCNC: 135 MG/DL (ref 65–99)
POTASSIUM SERPL-SCNC: 4.1 MMOL/L (ref 3.5–5.2)
SODIUM SERPL-SCNC: 136 MMOL/L (ref 136–145)

## 2025-05-09 PROCEDURE — 25010000002 MEPERIDINE PER 100 MG: Performed by: ANESTHESIOLOGY

## 2025-05-09 PROCEDURE — 25010000002 ONDANSETRON PER 1 MG: Performed by: NURSE ANESTHETIST, CERTIFIED REGISTERED

## 2025-05-09 PROCEDURE — 25010000002 DEXAMETHASONE PER 1 MG: Performed by: NURSE ANESTHETIST, CERTIFIED REGISTERED

## 2025-05-09 PROCEDURE — 88341 IMHCHEM/IMCYTCHM EA ADD ANTB: CPT | Performed by: UROLOGY

## 2025-05-09 PROCEDURE — 25010000002 CEFAZOLIN PER 500 MG: Performed by: UROLOGY

## 2025-05-09 PROCEDURE — 25010000002 SUGAMMADEX 200 MG/2ML SOLUTION: Performed by: NURSE ANESTHETIST, CERTIFIED REGISTERED

## 2025-05-09 PROCEDURE — 25010000002 PROPOFOL 10 MG/ML EMULSION: Performed by: NURSE ANESTHETIST, CERTIFIED REGISTERED

## 2025-05-09 PROCEDURE — 82948 REAGENT STRIP/BLOOD GLUCOSE: CPT | Performed by: NURSE ANESTHETIST, CERTIFIED REGISTERED

## 2025-05-09 PROCEDURE — 88307 TISSUE EXAM BY PATHOLOGIST: CPT | Performed by: UROLOGY

## 2025-05-09 PROCEDURE — 25810000003 LACTATED RINGERS PER 1000 ML: Performed by: ANESTHESIOLOGY

## 2025-05-09 PROCEDURE — 25010000002 LIDOCAINE PF 2% 2 % SOLUTION: Performed by: NURSE ANESTHETIST, CERTIFIED REGISTERED

## 2025-05-09 PROCEDURE — 88342 IMHCHEM/IMCYTCHM 1ST ANTB: CPT | Performed by: UROLOGY

## 2025-05-09 PROCEDURE — 52234 CYSTOSCOPY AND TREATMENT: CPT | Performed by: UROLOGY

## 2025-05-09 PROCEDURE — 80048 BASIC METABOLIC PNL TOTAL CA: CPT | Performed by: ANESTHESIOLOGY

## 2025-05-09 PROCEDURE — 25010000002 PHENYLEPHRINE HCL-NACL 1000-0.9 MCG/10ML-% SOLUTION PREFILLED SYRINGE: Performed by: NURSE ANESTHETIST, CERTIFIED REGISTERED

## 2025-05-09 PROCEDURE — 82948 REAGENT STRIP/BLOOD GLUCOSE: CPT

## 2025-05-09 PROCEDURE — 25010000002 FENTANYL CITRATE (PF) 50 MCG/ML SOLUTION: Performed by: NURSE ANESTHETIST, CERTIFIED REGISTERED

## 2025-05-09 PROCEDURE — 25010000002 HYDROMORPHONE 1 MG/ML SOLUTION: Performed by: NURSE ANESTHETIST, CERTIFIED REGISTERED

## 2025-05-09 PROCEDURE — S0260 H&P FOR SURGERY: HCPCS | Performed by: UROLOGY

## 2025-05-09 RX ORDER — ONDANSETRON 2 MG/ML
4 INJECTION INTRAMUSCULAR; INTRAVENOUS ONCE AS NEEDED
Status: DISCONTINUED | OUTPATIENT
Start: 2025-05-09 | End: 2025-05-12 | Stop reason: HOSPADM

## 2025-05-09 RX ORDER — ACETAMINOPHEN 500 MG
1000 TABLET ORAL ONCE
Status: DISCONTINUED | OUTPATIENT
Start: 2025-05-09 | End: 2025-05-09 | Stop reason: HOSPADM

## 2025-05-09 RX ORDER — ACETAMINOPHEN 325 MG/1
650 TABLET ORAL ONCE
Status: DISCONTINUED | OUTPATIENT
Start: 2025-05-09 | End: 2025-05-12 | Stop reason: HOSPADM

## 2025-05-09 RX ORDER — ONDANSETRON 4 MG/1
4 TABLET, ORALLY DISINTEGRATING ORAL ONCE AS NEEDED
Status: DISCONTINUED | OUTPATIENT
Start: 2025-05-09 | End: 2025-05-12 | Stop reason: HOSPADM

## 2025-05-09 RX ORDER — FENTANYL CITRATE 50 UG/ML
INJECTION, SOLUTION INTRAMUSCULAR; INTRAVENOUS AS NEEDED
Status: DISCONTINUED | OUTPATIENT
Start: 2025-05-09 | End: 2025-05-09 | Stop reason: SURG

## 2025-05-09 RX ORDER — MEPERIDINE HYDROCHLORIDE 25 MG/ML
12.5 INJECTION INTRAMUSCULAR; INTRAVENOUS; SUBCUTANEOUS ONCE
Status: COMPLETED | OUTPATIENT
Start: 2025-05-09 | End: 2025-05-09

## 2025-05-09 RX ORDER — MAGNESIUM HYDROXIDE 1200 MG/15ML
LIQUID ORAL AS NEEDED
Status: DISCONTINUED | OUTPATIENT
Start: 2025-05-09 | End: 2025-05-09 | Stop reason: HOSPADM

## 2025-05-09 RX ORDER — PROMETHAZINE HYDROCHLORIDE 25 MG/1
25 TABLET ORAL ONCE AS NEEDED
Status: DISCONTINUED | OUTPATIENT
Start: 2025-05-09 | End: 2025-05-12 | Stop reason: HOSPADM

## 2025-05-09 RX ORDER — PHENAZOPYRIDINE HYDROCHLORIDE 100 MG/1
100 TABLET, FILM COATED ORAL 3 TIMES DAILY PRN
Start: 2025-05-09

## 2025-05-09 RX ORDER — LIDOCAINE HYDROCHLORIDE 20 MG/ML
INJECTION, SOLUTION EPIDURAL; INFILTRATION; INTRACAUDAL; PERINEURAL AS NEEDED
Status: DISCONTINUED | OUTPATIENT
Start: 2025-05-09 | End: 2025-05-09 | Stop reason: SURG

## 2025-05-09 RX ORDER — OXYBUTYNIN CHLORIDE 5 MG/1
5 TABLET ORAL 3 TIMES DAILY PRN
Qty: 12 TABLET | Refills: 0 | Status: SHIPPED | OUTPATIENT
Start: 2025-05-09

## 2025-05-09 RX ORDER — PROPOFOL 10 MG/ML
VIAL (ML) INTRAVENOUS AS NEEDED
Status: DISCONTINUED | OUTPATIENT
Start: 2025-05-09 | End: 2025-05-09 | Stop reason: SURG

## 2025-05-09 RX ORDER — ROCURONIUM BROMIDE 10 MG/ML
INJECTION, SOLUTION INTRAVENOUS AS NEEDED
Status: DISCONTINUED | OUTPATIENT
Start: 2025-05-09 | End: 2025-05-09 | Stop reason: SURG

## 2025-05-09 RX ORDER — PHENYLEPHRINE HCL IN 0.9% NACL 1 MG/10 ML
SYRINGE (ML) INTRAVENOUS AS NEEDED
Status: DISCONTINUED | OUTPATIENT
Start: 2025-05-09 | End: 2025-05-09 | Stop reason: SURG

## 2025-05-09 RX ORDER — OXYCODONE HYDROCHLORIDE 5 MG/1
5 TABLET ORAL
Status: DISCONTINUED | OUTPATIENT
Start: 2025-05-09 | End: 2025-05-12 | Stop reason: HOSPADM

## 2025-05-09 RX ORDER — OXYCODONE AND ACETAMINOPHEN 5; 325 MG/1; MG/1
.5-2 TABLET ORAL EVERY 6 HOURS PRN
Qty: 12 TABLET | Refills: 0 | Status: SHIPPED | OUTPATIENT
Start: 2025-05-09

## 2025-05-09 RX ORDER — SODIUM CHLORIDE, SODIUM LACTATE, POTASSIUM CHLORIDE, CALCIUM CHLORIDE 600; 310; 30; 20 MG/100ML; MG/100ML; MG/100ML; MG/100ML
9 INJECTION, SOLUTION INTRAVENOUS CONTINUOUS PRN
Status: DISCONTINUED | OUTPATIENT
Start: 2025-05-09 | End: 2025-05-10 | Stop reason: HOSPADM

## 2025-05-09 RX ORDER — DEXAMETHASONE SODIUM PHOSPHATE 4 MG/ML
INJECTION, SOLUTION INTRA-ARTICULAR; INTRALESIONAL; INTRAMUSCULAR; INTRAVENOUS; SOFT TISSUE AS NEEDED
Status: DISCONTINUED | OUTPATIENT
Start: 2025-05-09 | End: 2025-05-09 | Stop reason: SURG

## 2025-05-09 RX ORDER — CLOPIDOGREL BISULFATE 75 MG/1
75 TABLET ORAL DAILY
Start: 2025-05-12

## 2025-05-09 RX ORDER — IBUPROFEN 600 MG/1
600 TABLET, FILM COATED ORAL EVERY 6 HOURS PRN
Status: DISCONTINUED | OUTPATIENT
Start: 2025-05-09 | End: 2025-05-12 | Stop reason: HOSPADM

## 2025-05-09 RX ORDER — PROMETHAZINE HYDROCHLORIDE 12.5 MG/1
12.5 TABLET ORAL ONCE AS NEEDED
Status: DISCONTINUED | OUTPATIENT
Start: 2025-05-09 | End: 2025-05-12 | Stop reason: HOSPADM

## 2025-05-09 RX ORDER — PROMETHAZINE HYDROCHLORIDE 25 MG/1
25 SUPPOSITORY RECTAL ONCE AS NEEDED
Status: DISCONTINUED | OUTPATIENT
Start: 2025-05-09 | End: 2025-05-12 | Stop reason: HOSPADM

## 2025-05-09 RX ORDER — ASPIRIN 81 MG/1
81 TABLET, CHEWABLE ORAL DAILY
Start: 2025-05-11

## 2025-05-09 RX ADMIN — Medication 200 MCG: at 10:37

## 2025-05-09 RX ADMIN — HYDROMORPHONE HYDROCHLORIDE 0.5 MG: 1 INJECTION, SOLUTION INTRAMUSCULAR; INTRAVENOUS; SUBCUTANEOUS at 11:44

## 2025-05-09 RX ADMIN — ONDANSETRON 4 MG: 2 INJECTION INTRAMUSCULAR; INTRAVENOUS at 11:52

## 2025-05-09 RX ADMIN — SODIUM CHLORIDE, POTASSIUM CHLORIDE, SODIUM LACTATE AND CALCIUM CHLORIDE 9 ML/HR: 600; 310; 30; 20 INJECTION, SOLUTION INTRAVENOUS at 10:04

## 2025-05-09 RX ADMIN — SUGAMMADEX 200 MG: 100 INJECTION, SOLUTION INTRAVENOUS at 11:12

## 2025-05-09 RX ADMIN — OXYCODONE 5 MG: 5 TABLET ORAL at 11:42

## 2025-05-09 RX ADMIN — ROCURONIUM BROMIDE 50 MG: 10 INJECTION, SOLUTION INTRAVENOUS at 10:23

## 2025-05-09 RX ADMIN — HYDROMORPHONE HYDROCHLORIDE 0.5 MG: 1 INJECTION, SOLUTION INTRAMUSCULAR; INTRAVENOUS; SUBCUTANEOUS at 11:36

## 2025-05-09 RX ADMIN — PROPOFOL 120 MG: 10 INJECTION, EMULSION INTRAVENOUS at 10:23

## 2025-05-09 RX ADMIN — FENTANYL CITRATE 25 MCG: 50 INJECTION, SOLUTION INTRAMUSCULAR; INTRAVENOUS at 10:23

## 2025-05-09 RX ADMIN — DEXAMETHASONE SODIUM PHOSPHATE 4 MG: 4 INJECTION, SOLUTION INTRAMUSCULAR; INTRAVENOUS at 10:23

## 2025-05-09 RX ADMIN — Medication 100 MCG: at 10:40

## 2025-05-09 RX ADMIN — MEPERIDINE HYDROCHLORIDE 12.5 MG: 25 INJECTION, SOLUTION INTRAMUSCULAR; INTRAVENOUS; SUBCUTANEOUS at 11:59

## 2025-05-09 RX ADMIN — Medication 100 MCG: at 10:34

## 2025-05-09 RX ADMIN — LIDOCAINE HYDROCHLORIDE 80 MG: 20 INJECTION, SOLUTION INTRAVENOUS at 10:23

## 2025-05-09 RX ADMIN — SODIUM CHLORIDE 2000 MG: 9 INJECTION, SOLUTION INTRAVENOUS at 10:17

## 2025-05-09 NOTE — SIGNIFICANT NOTE
Patient discharged home with khan in place.  Leg bag also provided.  Discharge instructions given khan care and teaching done for leg bag.

## 2025-05-09 NOTE — ANESTHESIA PREPROCEDURE EVALUATION
Anesthesia Evaluation     Patient summary reviewed and Nursing notes reviewed   no history of anesthetic complications:   NPO Solid Status: > 8 hours  NPO Liquid Status: > 2 hours           Airway   Mallampati: II  TM distance: >3 FB  Neck ROM: full  No difficulty expected  Dental    (+) upper dentures and poor dentition        Pulmonary - negative pulmonary ROS and normal exam    breath sounds clear to auscultation  Cardiovascular - normal exam  Exercise tolerance: good (4-7 METS)    ECG reviewed  PT is on anticoagulation therapy  Rhythm: regular  Rate: normal    (+) hypertension (amlodipine, lisinopril/hctz) well controlled, valvular problems/murmurs (AS sp AVR/TAVR) AS, past MI (NSTEMI after TURBT (at home, not immed post-op))  >12 months, CAD, CABG (2V >40 years ago) >6 Months, cardiac stents (On Plavix and ASA, has held 5 days) more than 12 months ago , angina, hyperlipidemia,  carotid artery disease right carotid    ROS comment: Plavix (LD:    Echo (23; chest pain):  ·  Left ventricular ejection fraction appears to be 51 - 55%.  ·  Left ventricular diastolic function is consistent with (grade I) impaired relaxation.  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    EK bpm  Sinus rhythm  Inferior  infarct, old  Nonspecific  T abnormalities, lateral leads  Prolonged QT interval  NO SIGNIFICANT CHANGE FROM PREVIOUS ECG  Electronically Signed By: Eleno Francisco ([facility]) 2024 18:10:31  Date and Time of Study:2024 16:35:58      Neuro/Psych  (+) TIA (at time of MI), dizziness/light headedness, weakness, psychiatric history AnxietyCVA: 2019.  GI/Hepatic/Renal/Endo    (+) GERD well controlled, renal disease-, diabetes mellitus type 2, thyroid problem hypothyroidism    Musculoskeletal     (+) chronic pain      ROS comment: Can not lay flat due to back pain  Abdominal  - normal exam   Substance History - negative use     OB/GYN negative ob/gyn ROS         Other   arthritis,   history of  cancer    ROS/Med Hx Other: >4METS,ACTIVE,BIKES,WALKS,LEG WORK OUTS. HX CAD,2VCABG 40+ YRS AGO,TAVR(PROSTHETIC),STENTS X2 2023.DM.NSTEMI S/P TURBT 8/23. CATH 8/23: MED MGMT. ECHO 8/30/23 EF 51-55%, NO VALVE STENOSIS. PREV.CARDS CLEARANCE 9/5/24 FOR TURBT.MED DIRECTIVE 3/4/25.KT                     Anesthesia Plan    ASA 3     general     (Patient understands anesthesia not responsible for dental damage. Needs to be sitting up on emergence/recovery from anesthesia.     Post-op: patient requests that back of bed be raised up, can't lay flat due to back pain)  intravenous induction     Anesthetic plan, risks, benefits, and alternatives have been provided, discussed and informed consent has been obtained with: patient.    Use of blood products discussed with patient .    Plan discussed with CRNA.        CODE STATUS:

## 2025-05-09 NOTE — OP NOTE
Preoperative diagnosis  Bladder cancer    Postoperative diagnosis  Bladder cancer    Procedure performed  Cystoscopy, transurethral resection of bladder tumor, small  <2cm (84223)    Attending surgeon  Debbie Rosa MD     Anesthesia  General    EBL  0 mL    Complications  None    Specimen   bladder tumor    Findings  Patent prostatic urethra with TUR defect; multiple bladder diverticula; left bladder diverticulum with papillary tumor protruded, resected; bladder papillary carpeting and some tiny tumors extensively fulgurated primarily at left lateral wall, base of bladder, dome of bladder, and posterior wall    Indications  85 y.o. male agreed to undergo the above named procedure after discussion of the alternatives, risks and benefits.   Informed consent was obtained.      Procedure  After informed consent was obtained, the patient was taken back to the  operating suite where general anesthesia was administered.  Once the patient  was adequately anesthetized, he was placed in the dorsal lithotomy position.  His genitals were prepped and draped in the normal sterile fashion.  A rigid  cystoscope was inserted gently into the urethral meatus and passed along the  length of the urethra.  There were no strictures, stenosis, or bladder neck contracture; TURP defect noted, patent prostatic urethra.  Pan cystoscopy was performed in a 360-degree manner.  Several diverticula noted, prominent left diverticula noted.  Upon careful inspection of the diverticula, there was noted to be some papillary tumor under the ridge of the diverticulum.  Diffuse tiny, 1-2 mm, papillary tumors and areas of carpeting throughout the bladder; primarily at bladder base, left lateral wall, posterior wall, and dome.  Prior resection sites noted with some patchy erythema adjacent to it.  Once pan cystoscopy was completed, the cystoscope was removed and a resectoscope was inserted.  The bladder  Bladder tumor was was resected.  Urinary  resection approximately 1 cm.  The surrounding area, patchy erythema, papillary carpeting as well as a lesion within the diverticulum were then extensively fulgurated.  Total area of fulguration was 5 cm.  No significant bleeding noted.  Hemostasis was performed throughout. The  Any piece of tissue was then irrigated out and passed for pathology.   Final inspection revealed adequate  hemostasis. The scope was removed with bladder full.  18 Slovenian catheter was inserted, balloon inflated and catheter secured.  At this point the procedure was deemed  terminated.  The patient was placed back in the supine position, awoken from  anesthesia, and transferred to the PACU in good condition.        Signed:  Debbie Rosa MD  05/09/25  11:24 EDT

## 2025-05-09 NOTE — ANESTHESIA POSTPROCEDURE EVALUATION
Patient: Andrea Odom    Procedure Summary       Date: 05/09/25 Room / Location: [facility] OR 08 / [facility] MAIN OR    Anesthesia Start: 1017 Anesthesia Stop: 1122    Procedure: CYSTOSCOPY TRANSURETHRAL RESECTION OF BLADDER TUMOR (Urethra) Diagnosis:       Malignant neoplasm of lateral wall of urinary bladder      (Malignant neoplasm of lateral wall of urinary bladder [C67.2])    Surgeons: Debbie Rosa MD Provider: Shayla Agee MD    Anesthesia Type: general ASA Status: 3            Anesthesia Type: general    Vitals  Vitals Value Taken Time   /66 05/09/25 11:47   Temp 36.7 °C (98 °F) 05/09/25 11:20   Pulse 84 05/09/25 11:47   Resp 20 05/09/25 11:45   SpO2 96 % 05/09/25 11:47   Vitals shown include unfiled device data.        Post Anesthesia Care and Evaluation    Patient location during evaluation: bedside  Patient participation: complete - patient participated  Level of consciousness: awake  Pain management: adequate    Airway patency: patent  PONV Status: none  Cardiovascular status: acceptable and stable  Respiratory status: acceptable  Hydration status: acceptable

## 2025-05-09 NOTE — DISCHARGE INSTRUCTIONS
DISCHARGE INSTRUCTIONS CYSTOSCOPY      For your surgery you had:  General anesthesia (you may have a sore throat for the first 24 hours)  IV sedation  Local anesthesia  Monitored anesthesia care  You may experience dizziness, drowsiness, or lightheadedness for several hours following surgery.  Do not stay alone today or tonight.  Limit your activity for 24 hours.  You should not drive, operate machinery, drink alcohol, or sign legally binding documents for 24 hours or while you are taking pain medication.  Resume your diet slowly.  Follow any special dietary instructions you may have been given by your doctor.  Last dose of pain medication given at:   .  NOTIFY YOUR DOCTOR IF YOU EXPERIENCE ANY OF THE FOLLOWING:  Temperature greater than 101 degrees Fahrenheit  Shaking Chills  Redness or excessive drainage from incision  Nausea, vomiting and/or pain that is not controlled by prescribed medications  Increase in bleeding or bleeding that is excessive  Unable to urinate in 6 hours after surgery  If unable to reach your doctor, please go to the closest Emergency Room   Following your cystoscopy exam, you may experience burning upon urination.  You may also pass some bloody urine.  If the burning sensation and/or bloody urine should persist beyond 48 hours, call your doctor.  To encourage kidney and bladder function, you should drink as much fluid as possible.  If you have difficulty urinating, try sitting in a bath tub of warm water.  If you become uncomfortable because you cannot urinate, call your doctor or come to the Emergency Room at the hospital.  Medications per physician instructions as indicated on Discharge Medication Information Sheet.    You should see Dr. Rosa for follow-up care  on August 6, 2025 @ 8:30AM    Phone number: [phone]       SPECIAL INSTRUCTIONS:

## 2025-05-09 NOTE — H&P
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 male history ofMalignant neoplasm of lateral wall of urinary bladder [C67.2] who presents for further management OR.  Presents for planned Procedure(s):  CYSTOSCOPY TRANSURETHRAL RESECTION OF BLADDER TUMOR  Plan text: Scope bladder, resect bladder tumor;  .    Risk, benefits, and alternatives discussed with patient prior to today.All questions were addressed after providing time for discussion.  Patient denies significant changes since last visit.  No new complaints today.    Review of Systems   All systems were reviewed and negative except for the above  Personal History     Past Medical History:   Diagnosis Date    Anxiety     Arthritis     Bladder cancer     Bladder tumor     BPH (benign prostatic hyperplasia)     Coronary artery disease     CABG 35-40 YEARS OLD 2 VESSEL CABG/  FOLLOWED BY DR HANSON. DECREASED ACTIVITY USES CANE    Diabetes mellitus     BG RUNS AROUND 120'S    Frequent UTI     GERD (gastroesophageal reflux disease)     Hyperlipidemia     Hypertension     NSTEMI (non-ST elevated myocardial infarction) 2023    2 STENTS PER PATIENT- SEE'S DR. TANNER CALDWELL MD ON 3-27-25 1500    Stricture of male urethra     Stroke     SAYS ABOUT 4-5 YEARS AGO NO RESIDUAL       Past Surgical History:   Procedure Laterality Date    AORTIC VALVE REPAIR/REPLACEMENT      PT UNSURE WHEN STATED SEVERAL YEARS AGO    CARDIAC CATHETERIZATION      stent placed    CARDIAC CATHETERIZATION N/A 2022    Procedure: Left Heart Cath;  Surgeon: Allison Lazcano MD;  Location: [facility] CATH INVASIVE LOCATION;  Service:  Cardiovascular;  Laterality: N/A;    CARDIAC CATHETERIZATION N/A 08/11/2022    Procedure: Possible Percutaneous Coronary Intervention;  Surgeon: Allison Lazcano MD;  Location: [facility] CATH INVASIVE LOCATION;  Service: Cardiovascular;  Laterality: N/A;    CARDIAC CATHETERIZATION N/A 08/27/2023    Procedure: Left Heart Cath;  Surgeon: Saúl Berry MD;  Location: [facility] CATH INVASIVE LOCATION;  Service: Cardiology;  Laterality: N/A;    CARDIAC SURGERY      2 VESSEL CABG SAYS AROUND 35-40    CYSTOSCOPY TRANSURETHRAL RESECTION OF PROSTATE N/A 08/25/2023    Procedure: MEATOTOMY, cysotscopy, transurethral resection of prostate;  Surgeon: Debbie Rosa MD;  Location: [facility];  Service: Urology;  Laterality: N/A;    CYSTOSCOPY WITH CLOT EVACUATION N/A 08/26/2023    Procedure: CYSTOSCOPY WITH CLOT EVACUATION, bladder fulguration;  Surgeon: Debbie Rosa MD;  Location: [facility] OR;  Service: Urology;  Laterality: N/A;    CYSTOSCOPY, RETROGRADE PYELOGRAM AND STENT INSERTION Left 08/26/2023    Procedure: CYSTOSCOPY RETROGRADE PYELOGRAM AND STENT INSERTION, left;  Surgeon: Debbie Rosa MD;  Location: [facility];  Service: Urology;  Laterality: Left;    SHOULDER SURGERY      RCR UNSURE OF SIDE    TRANSURETHRAL RESECTION OF BLADDER TUMOR N/A 04/26/2024    Procedure: CYSTOSCOPY TRANSURETHRAL RESECTION OF BLADDER TUMOR;  Surgeon: Debbie Rosa MD;  Location: [facility];  Service: Urology;  Laterality: N/A;    TRANSURETHRAL RESECTION OF BLADDER TUMOR N/A 11/11/2024    Procedure: CYSTOSCOPY, Bladder fulguration, TURBT;  Surgeon: Debbie Rosa MD;  Location: [facility];  Service: Urology;  Laterality: N/A;       Family History: family history is not on file. Otherwise pertinent FHx was reviewed and not pertinent to current issue.    Social History:  reports that he has never smoked. He has never been exposed to tobacco smoke. He has never used smokeless tobacco. He reports  "that he does not drink alcohol and does not use drugs.    Home Medications:  NON FORMULARY, amLODIPine, aspirin, busPIRone, clopidogrel, cyanocobalamin, hydrOXYzine, levothyroxine, lisinopril-hydrochlorothiazide, metFORMIN, nitroglycerin, omeprazole, rosuvastatin, and sertraline    Allergies:  Allergies   Allergen Reactions    Latex, Natural Rubber Rash    Pantoprazole Other (See Comments)     \"MADE ME FEEL BAD\"    Penicillins Dizziness           Sulfa Antibiotics Other (See Comments)     \"FEEL BAD\"       Objective    Objective     Vitals:        Physical Exam:   Constitutional: Awake, alert   Respiratory: Clear, nonlabored respirations    Cardiovascular: Regular rate, no chest retractions   gastrointestinal: Appears soft, nontender     Results:    Assessment & Plan   Assessment / Plan     Brief Patient Summary:  Andrea Odom is a 85 y.o. male who     Active Hospital Problems:  Active Hospital Problems    Diagnosis     **Malignant neoplasm of urinary bladder        Plan:   Proceed to the OR for planned procedure, Procedure(s):  CYSTOSCOPY TRANSURETHRAL RESECTION OF BLADDER TUMOR  Plan text: Scope bladder, resect bladder tumor,  ,   Risk, benefits, and alternatives discussed with patient at length he is agreeable to proceed  All questions addressed      Electronically signed by Debbie Rosa MD, 05/09/25, 9:08 AM EDT.     "

## 2025-05-13 ENCOUNTER — TELEPHONE (OUTPATIENT)
Dept: UROLOGY | Age: 85
End: 2025-05-13
Payer: MEDICARE

## 2025-05-13 NOTE — TELEPHONE ENCOUNTER
TEO CANALES CALLED FROM OFFICE OF [facility], PATIENT'S PCP.  PATIENT CALLED THERE AND TOLD THEM HE IS SUPPOSED TO GET HIS CATHETER REMOVED TOMORROW, AND WANTS THEM TO REMOVE IT.    SHE SAID THEY WILL NEED AN ORDER, IF DR. SINHA WANTS THEM TO REMOVE IT.  THEIR FAX [phone].    #[phone] OPT. 2, ASK FOR TEO CANALES

## 2025-05-22 ENCOUNTER — TELEPHONE (OUTPATIENT)
Dept: UROLOGY | Age: 85
End: 2025-05-22
Payer: MEDICARE

## 2025-05-23 DIAGNOSIS — R30.0 DYSURIA: Primary | ICD-10-CM

## 2025-05-23 NOTE — TELEPHONE ENCOUNTER
Call made to pt to return call; pt states he is still burning when he urinates and he goes at least 10x a night and his stream is not as strong as before surgery; RN states will order urine culture to see if infection is there and then will go from there as these symptoms could be from infection; pt wants order to go to [facility]; no further questions at this time; order faxed to [facility]

## 2025-05-27 ENCOUNTER — TELEPHONE (OUTPATIENT)
Dept: UROLOGY | Age: 85
End: 2025-05-27
Payer: MEDICARE

## 2025-05-27 NOTE — TELEPHONE ENCOUNTER
Call made to pt to inform that order resent to [facility]; pt states will go check soon; RN states to call back if they have not rec'd

## 2025-05-27 NOTE — TELEPHONE ENCOUNTER
Caller: Andrea Odom    Relationship: Self    Best call back number:   Telephone Information:   Mobile [phone]         What is the best time to reach you: ANYTIME    Who are you requesting to speak with (clinical staff, provider,  specific staff member): DR SINHA'S NURSE    What was the call regarding: PT SAID HE WENT TO [facility] ON SATURDAY FOR THE URINE TEST AND WAS TOLD THERE WAS NO ORDER.  HE IS CALLING BACK TO REQUEST THE ORDER BE SENT TO [facility].  HUB ATTEMPTED BUT WAS UNABLE TO WARM TRANSFER TO THE OFFICE.  PLEASE CALL PT TO ADVISE.  THANK YOU.

## 2025-05-29 ENCOUNTER — TELEPHONE (OUTPATIENT)
Dept: UROLOGY | Age: 85
End: 2025-05-29
Payer: MEDICARE

## 2025-05-29 NOTE — TELEPHONE ENCOUNTER
Call made to pt to go over urine culture results as no infection at this time; pt wondering why he is having frequency of urination and feels as though he is not emptying; RN states can get him in for bladder scan tomorrow; pt is agreeable; nurse visit scheduled

## 2025-05-29 NOTE — TELEPHONE ENCOUNTER
Caller: Andrea Odom    Relationship: Self    Best call back number: [phone]    Caller requesting test results: PT    What test was performed: URINE CULTURE    When was the test performed: 05.23.2025    Where was the test performed: [facility]    Additional notes: PLEASE CALL WITH RESULTS. THANK YOU

## 2025-05-30 ENCOUNTER — CLINICAL SUPPORT (OUTPATIENT)
Dept: UROLOGY | Age: 85
End: 2025-05-30
Payer: MEDICARE

## 2025-05-30 DIAGNOSIS — R30.0 DYSURIA: Primary | ICD-10-CM

## 2025-05-30 DIAGNOSIS — R39.15 URGENCY OF URINATION: ICD-10-CM

## 2025-05-30 DIAGNOSIS — N39.0 URINARY TRACT INFECTION IN MALE: ICD-10-CM

## 2025-05-30 LAB
BILIRUB BLD-MCNC: NEGATIVE MG/DL
CLARITY, POC: ABNORMAL
COLOR UR: YELLOW
EXPIRATION DATE: ABNORMAL
GLUCOSE UR STRIP-MCNC: NEGATIVE MG/DL
KETONES UR QL: NEGATIVE
LEUKOCYTE EST, POC: ABNORMAL
Lab: ABNORMAL
NITRITE UR-MCNC: NEGATIVE MG/ML
PH UR: 6.5 [PH] (ref 5–8)
PROT UR STRIP-MCNC: ABNORMAL MG/DL
RBC # UR STRIP: ABNORMAL /UL
SP GR UR: 1.01 (ref 1–1.03)
URINE VOLUME: 0
UROBILINOGEN UR QL: NORMAL

## 2025-05-30 PROCEDURE — 87086 URINE CULTURE/COLONY COUNT: CPT | Performed by: UROLOGY

## 2025-05-30 RX ORDER — OXYBUTYNIN CHLORIDE 5 MG/1
5 TABLET ORAL 3 TIMES DAILY PRN
Qty: 30 TABLET | Refills: 0 | Status: SHIPPED | OUTPATIENT
Start: 2025-05-30

## 2025-05-30 RX ORDER — PHENAZOPYRIDINE HYDROCHLORIDE 100 MG/1
100 TABLET, FILM COATED ORAL 3 TIMES DAILY PRN
Qty: 30 TABLET | Refills: 0 | Status: SHIPPED | OUTPATIENT
Start: 2025-05-30

## 2025-05-30 NOTE — PROGRESS NOTES
Pt presented to office with complaints of dysuria w/ burning, urgency, and nocturia ; UA performed and positive for small leuks and small blood and protein; sending for culture    Bladder Scan interpretation 05/30/2025    Estimation of residual urine via [employer] [facility] [place] Bladder Scan  MA/nurse performing: HERMINIO sneed   Residual Urine: 0 ml  Indication: Dysuria   Position: Supine  Examination: Incremental scanning of the suprapubic area using 2.0 MHz transducer using copious amounts of acoustic gel.   Findings: An anechoic area was demonstrated which represented the bladder, with measurement of residual urine as noted. I inspected this myself. In that the residual urine was stable or insignificant, refer to plan for treatment and plan necessary at this time.     Per MD, RN to order pyridium and oxybutynin for symptoms

## 2025-06-01 LAB — BACTERIA SPEC AEROBE CULT: NO GROWTH

## 2025-06-02 ENCOUNTER — RESULTS FOLLOW-UP (OUTPATIENT)
Dept: UROLOGY | Age: 85
End: 2025-06-02
Payer: MEDICARE

## 2025-08-29 ENCOUNTER — TELEPHONE (OUTPATIENT)
Dept: UROLOGY | Age: 85
End: 2025-08-29
Payer: MEDICARE

## (undated) DEVICE — GOWN,REINFORCE,POLY,SIRUS,BREATH SLV,XLG: Brand: MEDLINE

## (undated) DEVICE — INTRO SHEATH PRELUDE PRO .035 4F11X50 LF

## (undated) DEVICE — TRAY,IRRIGATION,BULBSYRINGE,60ML,CSR,PVP: Brand: MEDLINE

## (undated) DEVICE — SKIN PREP TRAY W/CHG: Brand: MEDLINE INDUSTRIES, INC.

## (undated) DEVICE — SET, IRRIGATION CYSTO, Y-TYPE, 81": Brand: MEDLINE

## (undated) DEVICE — CATH F4 INF JR 4 100CM: Brand: INFINITI

## (undated) DEVICE — HF-RESECTION ELECTRODE PLASMALOOP LOOP, MEDIUM, 24 FR., 12°/16°, PK TURIS: Brand: OLYMPUS

## (undated) DEVICE — CYSTO PACK: Brand: MEDLINE INDUSTRIES, INC.

## (undated) DEVICE — MAT FLR ABS W/BLU/LINER 56X72IN WHT

## (undated) DEVICE — MEDICINE CUP, GRADUATED, STER: Brand: MEDLINE

## (undated) DEVICE — GW FC FLOP/TP .035 260CM 3MM

## (undated) DEVICE — DRSNG TELFA PAD NONADH STR 1S 3X4IN

## (undated) DEVICE — CATH F6 ST JR 4 100CM: Brand: SUPERTORQUE

## (undated) DEVICE — TOWEL,OR,DSP,ST,BLUE,STD,4/PK,20PK/CS: Brand: MEDLINE

## (undated) DEVICE — HF-RESECTION ELECTRODE PLASMALOOP LOOP, LARGE, 24 FR., 12°-30°, PK TURIS: Brand: OLYMPUS

## (undated) DEVICE — CATH F4 INF JL 5 100CM: Brand: INFINITI

## (undated) DEVICE — CATH FOL LUBRISIL 3WY 22F 30CC

## (undated) DEVICE — TRAY,IRRIGATION,PISTON SYRINGE,60ML: Brand: MEDLINE

## (undated) DEVICE — SYRINGE,TOOMEY,IRRIGATION,70CC,STERILE: Brand: MEDLINE

## (undated) DEVICE — CONTAINER,SPEC,PNEUM TUBE,4OZ,STRL PATH: Brand: MEDLINE

## (undated) DEVICE — GLOVE,SURG,SENSICARE SLT,LF,PF,7: Brand: MEDLINE

## (undated) DEVICE — Device

## (undated) DEVICE — SI AVANTI+ 6F STD W/GW  NO OBT: Brand: AVANTI

## (undated) DEVICE — MYNXGRIP 6F/7F: Brand: MYNXGRIP

## (undated) DEVICE — TRAY,ADD A CATH,FOLEY,DRAIN BG,10ML SYR: Brand: MEDLINE

## (undated) DEVICE — MEDI-VAC NON-CONDUCTIVE SUCTION TUBING: Brand: CARDINAL HEALTH

## (undated) DEVICE — Y-TYPE TUR/BLADDER IRRIGATION SET, REGULATING CLAMP

## (undated) DEVICE — CYSTO/BLADDER IRRIGATION SET, REGULATING CLAMP

## (undated) DEVICE — SOL IRR NACL 0.9PCT 3000ML

## (undated) DEVICE — HF-RESECTION ELECTRODE PLASMABUTTON BUTTON, 24 FR., 12°-30°, PK TURIS: Brand: OLYMPUS

## (undated) DEVICE — CATH F6 ST JL 4 100CM: Brand: SUPERTORQUE

## (undated) DEVICE — SOL IRRG H2O BG 3000ML STRL

## (undated) DEVICE — TUBING, SUCTION, 1/4" X 10', STRAIGHT: Brand: MEDLINE

## (undated) DEVICE — CATH 4F INF. MPA2 100CM 2SH: Brand: INFINITI

## (undated) DEVICE — CATH 4F INF PIG 145Â° 110 CM: Brand: INFINITI

## (undated) DEVICE — CATH URETH FLEXIMA CONE TP 5F 70CM

## (undated) DEVICE — CATH FOLEY LUBRICATH 3WY 22F 30CC

## (undated) DEVICE — METER,URINE,400ML,DRAIN BAG,L/F,LL,SLIDE: Brand: MEDLINE

## (undated) DEVICE — GLV SURG SENSICARE SLT PF LF 7 STRL

## (undated) DEVICE — RADIFOCUS GLIDEWIRE: Brand: GLIDEWIRE

## (undated) DEVICE — NITINOL WIRE WITH HYDROPHILIC TIP: Brand: SENSOR